# Patient Record
Sex: MALE | Race: WHITE | NOT HISPANIC OR LATINO | Employment: UNEMPLOYED | URBAN - METROPOLITAN AREA
[De-identification: names, ages, dates, MRNs, and addresses within clinical notes are randomized per-mention and may not be internally consistent; named-entity substitution may affect disease eponyms.]

---

## 2018-07-07 ENCOUNTER — HOSPITAL ENCOUNTER (INPATIENT)
Facility: HOSPITAL | Age: 53
LOS: 8 days | Discharge: HOME | End: 2018-07-15
Attending: INTERNAL MEDICINE | Admitting: INTERNAL MEDICINE

## 2018-07-07 ENCOUNTER — APPOINTMENT (OUTPATIENT)
Dept: CT IMAGING | Facility: HOSPITAL | Age: 53
End: 2018-07-07

## 2018-07-07 ENCOUNTER — APPOINTMENT (OUTPATIENT)
Dept: GENERAL RADIOLOGY | Facility: HOSPITAL | Age: 53
End: 2018-07-07

## 2018-07-07 ENCOUNTER — HOSPITAL ENCOUNTER (EMERGENCY)
Facility: HOSPITAL | Age: 53
Discharge: ANOTHER HOSPITAL | End: 2018-07-07
Attending: EMERGENCY MEDICINE | Admitting: EMERGENCY MEDICINE

## 2018-07-07 VITALS
RESPIRATION RATE: 24 BRPM | OXYGEN SATURATION: 96 % | BODY MASS INDEX: 34.58 KG/M2 | HEIGHT: 69 IN | HEART RATE: 109 BPM | WEIGHT: 233.5 LBS | SYSTOLIC BLOOD PRESSURE: 129 MMHG | DIASTOLIC BLOOD PRESSURE: 84 MMHG | TEMPERATURE: 98.4 F

## 2018-07-07 DIAGNOSIS — E08.10 DIABETIC KETOACIDOSIS WITHOUT COMA ASSOCIATED WITH DIABETES MELLITUS DUE TO UNDERLYING CONDITION (HCC): ICD-10-CM

## 2018-07-07 DIAGNOSIS — R26.89 DECREASED MOBILITY: ICD-10-CM

## 2018-07-07 DIAGNOSIS — J18.9 PNEUMONIA: ICD-10-CM

## 2018-07-07 DIAGNOSIS — A41.9 SEPSIS, DUE TO UNSPECIFIED ORGANISM: ICD-10-CM

## 2018-07-07 DIAGNOSIS — J18.9 PLEURAL EFFUSION ASSOCIATED WITH PULMONARY INFECTION: Primary | ICD-10-CM

## 2018-07-07 DIAGNOSIS — J91.8 PLEURAL EFFUSION ASSOCIATED WITH PULMONARY INFECTION: Primary | ICD-10-CM

## 2018-07-07 DIAGNOSIS — J90 PLEURAL EFFUSION: Primary | ICD-10-CM

## 2018-07-07 LAB
ALBUMIN SERPL-MCNC: 3.7 G/DL (ref 3.5–5.2)
ALBUMIN/GLOB SERPL: 0.8 G/DL
ALP SERPL-CCNC: 171 U/L (ref 40–129)
ALT SERPL W P-5'-P-CCNC: 10 U/L (ref 5–41)
ANION GAP SERPL CALCULATED.3IONS-SCNC: 15.1 MMOL/L
ANION GAP SERPL CALCULATED.3IONS-SCNC: 30.6 MMOL/L
APTT PPP: 33.1 SECONDS (ref 22.7–35.4)
ARTERIAL PATENCY WRIST A: POSITIVE
AST SERPL-CCNC: 13 U/L (ref 5–40)
ATMOSPHERIC PRESS: 745 MMHG
BACTERIA UR QL AUTO: ABNORMAL /HPF
BASE EXCESS BLDA CALC-SCNC: -22.8 MMOL/L (ref 0–2)
BASOPHILS # BLD AUTO: 0.04 10*3/MM3 (ref 0–0.2)
BASOPHILS # BLD AUTO: 0.16 10*3/MM3 (ref 0–0.2)
BASOPHILS NFR BLD AUTO: 0.3 % (ref 0–1.5)
BASOPHILS NFR BLD AUTO: 0.8 % (ref 0–2)
BDY SITE: ABNORMAL
BILIRUB SERPL-MCNC: 0.3 MG/DL (ref 0.2–1.2)
BILIRUB UR QL STRIP: ABNORMAL
BODY TEMPERATURE: 37 C
BUN BLD-MCNC: 10 MG/DL (ref 6–20)
BUN BLD-MCNC: 10 MG/DL (ref 6–20)
BUN/CREAT SERPL: 13 (ref 7–25)
BUN/CREAT SERPL: 15.2 (ref 7–25)
CA-I BLD-MCNC: 5.6 MG/DL (ref 4.6–5.4)
CA-I SERPL ISE-MCNC: 1.41 MMOL/L (ref 1.15–1.35)
CALCIUM SPEC-SCNC: 9.7 MG/DL (ref 8.6–10.5)
CALCIUM SPEC-SCNC: 9.8 MG/DL (ref 8.6–10.5)
CHLORIDE SERPL-SCNC: 105 MMOL/L (ref 98–107)
CHLORIDE SERPL-SCNC: 97 MMOL/L (ref 98–107)
CLARITY UR: CLEAR
CO2 SERPL-SCNC: 12.9 MMOL/L (ref 22–29)
CO2 SERPL-SCNC: 7.4 MMOL/L (ref 22–29)
COLOR UR: ABNORMAL
CREAT BLD-MCNC: 0.66 MG/DL (ref 0.76–1.27)
CREAT BLD-MCNC: 0.77 MG/DL (ref 0.76–1.27)
D DIMER PPP FEU-MCNC: 5.18 MCGFEU/ML (ref 0–0.46)
D-LACTATE SERPL-SCNC: 1.2 MMOL/L (ref 0.5–2)
DEPRECATED RDW RBC AUTO: 41.2 FL (ref 37–54)
DEPRECATED RDW RBC AUTO: 42.5 FL (ref 37–54)
EOSINOPHIL # BLD AUTO: 0.02 10*3/MM3 (ref 0.1–0.3)
EOSINOPHIL # BLD AUTO: 0.05 10*3/MM3 (ref 0–0.7)
EOSINOPHIL NFR BLD AUTO: 0.1 % (ref 0–4)
EOSINOPHIL NFR BLD AUTO: 0.3 % (ref 0.3–6.2)
ERYTHROCYTE [DISTWIDTH] IN BLOOD BY AUTOMATED COUNT: 13.5 % (ref 11.5–14.5)
ERYTHROCYTE [DISTWIDTH] IN BLOOD BY AUTOMATED COUNT: 13.6 % (ref 11.5–14.5)
GAS FLOW AIRWAY: 2 LPM
GFR SERPL CREATININE-BSD FRML MDRD: 106 ML/MIN/1.73
GFR SERPL CREATININE-BSD FRML MDRD: 126 ML/MIN/1.73
GLOBULIN UR ELPH-MCNC: 4.6 GM/DL
GLUCOSE BLD-MCNC: 101 MG/DL (ref 65–99)
GLUCOSE BLD-MCNC: 313 MG/DL (ref 65–99)
GLUCOSE BLDC GLUCOMTR-MCNC: 112 MG/DL (ref 70–130)
GLUCOSE BLDC GLUCOMTR-MCNC: 122 MG/DL (ref 70–130)
GLUCOSE BLDC GLUCOMTR-MCNC: 148 MG/DL (ref 70–130)
GLUCOSE BLDC GLUCOMTR-MCNC: 168 MG/DL (ref 70–130)
GLUCOSE BLDC GLUCOMTR-MCNC: 242 MG/DL (ref 70–130)
GLUCOSE BLDC GLUCOMTR-MCNC: 256 MG/DL (ref 70–130)
GLUCOSE BLDC GLUCOMTR-MCNC: 256 MG/DL (ref 70–130)
GLUCOSE BLDC GLUCOMTR-MCNC: 300 MG/DL (ref 70–130)
GLUCOSE BLDC GLUCOMTR-MCNC: 300 MG/DL (ref 70–130)
GLUCOSE BLDC GLUCOMTR-MCNC: 95 MG/DL (ref 70–130)
GLUCOSE UR STRIP-MCNC: ABNORMAL MG/DL
HBA1C MFR BLD: 14.3 % (ref 4.8–5.6)
HCO3 BLDA-SCNC: 4.6 MMOL/L (ref 20–26)
HCT VFR BLD AUTO: 40.1 % (ref 40.4–52.2)
HCT VFR BLD AUTO: 46.7 % (ref 42–52)
HGB BLD-MCNC: 13.7 G/DL (ref 13.7–17.6)
HGB BLD-MCNC: 15.4 G/DL (ref 14–18)
HGB BLDA-MCNC: 15 G/DL (ref 14–18)
HGB UR QL STRIP.AUTO: ABNORMAL
HYALINE CASTS UR QL AUTO: ABNORMAL /LPF
IMM GRANULOCYTES # BLD: 0.42 10*3/MM3 (ref 0–0.03)
IMM GRANULOCYTES # BLD: 1.11 10*3/MM3 (ref 0–0.03)
IMM GRANULOCYTES NFR BLD: 2.7 % (ref 0–0.5)
IMM GRANULOCYTES NFR BLD: 5.3 % (ref 0–0.5)
INR PPP: 1.24 (ref 0.9–1.1)
KETONES UR QL STRIP: ABNORMAL
LEUKOCYTE ESTERASE UR QL STRIP.AUTO: NEGATIVE
LYMPHOCYTES # BLD AUTO: 1.85 10*3/MM3 (ref 0.9–4.8)
LYMPHOCYTES # BLD AUTO: 2.02 10*3/MM3 (ref 0.6–4.8)
LYMPHOCYTES NFR BLD AUTO: 12 % (ref 19.6–45.3)
LYMPHOCYTES NFR BLD AUTO: 9.6 % (ref 20–45)
Lab: ABNORMAL
Lab: ABNORMAL
MAGNESIUM SERPL-MCNC: 1.9 MG/DL (ref 1.6–2.6)
MCH RBC QN AUTO: 28 PG (ref 27–32.7)
MCH RBC QN AUTO: 28.3 PG (ref 27–31)
MCHC RBC AUTO-ENTMCNC: 33 G/DL (ref 31–37)
MCHC RBC AUTO-ENTMCNC: 34.2 G/DL (ref 32.6–36.4)
MCV RBC AUTO: 82 FL (ref 79.8–96.2)
MCV RBC AUTO: 85.7 FL (ref 80–94)
MODALITY: ABNORMAL
MONOCYTES # BLD AUTO: 1.78 10*3/MM3 (ref 0.2–1.2)
MONOCYTES # BLD AUTO: 2.11 10*3/MM3 (ref 0–1)
MONOCYTES NFR BLD AUTO: 10 % (ref 3–8)
MONOCYTES NFR BLD AUTO: 11.5 % (ref 5–12)
NEUTROPHILS # BLD AUTO: 11.75 10*3/MM3 (ref 1.9–8.1)
NEUTROPHILS # BLD AUTO: 15.69 10*3/MM3 (ref 1.5–8.3)
NEUTROPHILS NFR BLD AUTO: 74.2 % (ref 45–70)
NEUTROPHILS NFR BLD AUTO: 75.9 % (ref 42.7–76)
NITRITE UR QL STRIP: NEGATIVE
NOTIFIED BY: ABNORMAL
NOTIFIED WHO: ABNORMAL
NRBC BLD MANUAL-RTO: 0 /100 WBC (ref 0–0)
NRBC BLD MANUAL-RTO: 0.1 /100 WBC (ref 0–0)
PCO2 BLDA: 14.6 MM HG (ref 35–45)
PCO2 TEMP ADJ BLD: 14.6 MM HG (ref 35–45)
PH BLDA: 7.11 PH UNITS (ref 7.35–7.45)
PH UR STRIP.AUTO: <=5 [PH] (ref 4.5–8)
PH, TEMP CORRECTED: 7.11 PH UNITS (ref 7.35–7.45)
PHOSPHATE SERPL-MCNC: 1.6 MG/DL (ref 2.5–4.5)
PLAT MORPH BLD: NORMAL
PLATELET # BLD AUTO: 413 10*3/MM3 (ref 140–500)
PLATELET # BLD AUTO: 474 10*3/MM3 (ref 140–500)
PMV BLD AUTO: 9.1 FL (ref 6–12)
PMV BLD AUTO: 9.3 FL (ref 7.4–10.4)
PO2 BLDA: 98.8 MM HG (ref 83–108)
PO2 TEMP ADJ BLD: 98.8 MM HG (ref 83–108)
POLYCHROMASIA BLD QL SMEAR: NORMAL
POTASSIUM BLD-SCNC: 3.7 MMOL/L (ref 3.5–5.2)
POTASSIUM BLD-SCNC: 4.4 MMOL/L (ref 3.5–5.2)
PROCALCITONIN SERPL-MCNC: 0.13 NG/ML (ref 0.1–0.25)
PROT SERPL-MCNC: 8.3 G/DL (ref 6–8.5)
PROT UR QL STRIP: ABNORMAL
PROTHROMBIN TIME: 15.4 SECONDS (ref 11.7–14.2)
RBC # BLD AUTO: 4.89 10*6/MM3 (ref 4.6–6)
RBC # BLD AUTO: 5.45 10*6/MM3 (ref 4.7–6.1)
RBC # UR: ABNORMAL /HPF
REF LAB TEST METHOD: ABNORMAL
SAO2 % BLDCOA: 96.9 % (ref 94–99)
SODIUM BLD-SCNC: 133 MMOL/L (ref 136–145)
SODIUM BLD-SCNC: 135 MMOL/L (ref 136–145)
SP GR UR STRIP: 1.03 (ref 1–1.03)
SQUAMOUS #/AREA URNS HPF: ABNORMAL /HPF
TROPONIN T SERPL-MCNC: <0.01 NG/ML (ref 0–0.03)
UROBILINOGEN UR QL STRIP: ABNORMAL
VENTILATOR MODE: ABNORMAL
WBC MORPH BLD: NORMAL
WBC NRBC COR # BLD: 15.47 10*3/MM3 (ref 4.5–10.7)
WBC NRBC COR # BLD: 21.11 10*3/MM3 (ref 4.8–10.8)
WBC UR QL AUTO: ABNORMAL /HPF

## 2018-07-07 PROCEDURE — 84100 ASSAY OF PHOSPHORUS: CPT | Performed by: INTERNAL MEDICINE

## 2018-07-07 PROCEDURE — 83605 ASSAY OF LACTIC ACID: CPT | Performed by: EMERGENCY MEDICINE

## 2018-07-07 PROCEDURE — 80048 BASIC METABOLIC PNL TOTAL CA: CPT | Performed by: INTERNAL MEDICINE

## 2018-07-07 PROCEDURE — 82962 GLUCOSE BLOOD TEST: CPT

## 2018-07-07 PROCEDURE — 36600 WITHDRAWAL OF ARTERIAL BLOOD: CPT

## 2018-07-07 PROCEDURE — 82803 BLOOD GASES ANY COMBINATION: CPT

## 2018-07-07 PROCEDURE — 83036 HEMOGLOBIN GLYCOSYLATED A1C: CPT | Performed by: INTERNAL MEDICINE

## 2018-07-07 PROCEDURE — 84145 PROCALCITONIN (PCT): CPT | Performed by: INTERNAL MEDICINE

## 2018-07-07 PROCEDURE — 84484 ASSAY OF TROPONIN QUANT: CPT | Performed by: EMERGENCY MEDICINE

## 2018-07-07 PROCEDURE — 25010000002 KETOROLAC TROMETHAMINE PER 15 MG: Performed by: EMERGENCY MEDICINE

## 2018-07-07 PROCEDURE — 96367 TX/PROPH/DG ADDL SEQ IV INF: CPT

## 2018-07-07 PROCEDURE — 85610 PROTHROMBIN TIME: CPT | Performed by: INTERNAL MEDICINE

## 2018-07-07 PROCEDURE — 85379 FIBRIN DEGRADATION QUANT: CPT | Performed by: EMERGENCY MEDICINE

## 2018-07-07 PROCEDURE — 99285 EMERGENCY DEPT VISIT HI MDM: CPT

## 2018-07-07 PROCEDURE — 96366 THER/PROPH/DIAG IV INF ADDON: CPT

## 2018-07-07 PROCEDURE — 71045 X-RAY EXAM CHEST 1 VIEW: CPT

## 2018-07-07 PROCEDURE — 82330 ASSAY OF CALCIUM: CPT | Performed by: INTERNAL MEDICINE

## 2018-07-07 PROCEDURE — 93005 ELECTROCARDIOGRAM TRACING: CPT | Performed by: EMERGENCY MEDICINE

## 2018-07-07 PROCEDURE — 85025 COMPLETE CBC W/AUTO DIFF WBC: CPT | Performed by: EMERGENCY MEDICINE

## 2018-07-07 PROCEDURE — 96375 TX/PRO/DX INJ NEW DRUG ADDON: CPT

## 2018-07-07 PROCEDURE — 71275 CT ANGIOGRAPHY CHEST: CPT

## 2018-07-07 PROCEDURE — 99284 EMERGENCY DEPT VISIT MOD MDM: CPT | Performed by: EMERGENCY MEDICINE

## 2018-07-07 PROCEDURE — 85025 COMPLETE CBC W/AUTO DIFF WBC: CPT | Performed by: INTERNAL MEDICINE

## 2018-07-07 PROCEDURE — 80053 COMPREHEN METABOLIC PANEL: CPT | Performed by: EMERGENCY MEDICINE

## 2018-07-07 PROCEDURE — 25010000002 CEFTRIAXONE PER 250 MG: Performed by: INTERNAL MEDICINE

## 2018-07-07 PROCEDURE — 96365 THER/PROPH/DIAG IV INF INIT: CPT

## 2018-07-07 PROCEDURE — 85730 THROMBOPLASTIN TIME PARTIAL: CPT | Performed by: INTERNAL MEDICINE

## 2018-07-07 PROCEDURE — 81001 URINALYSIS AUTO W/SCOPE: CPT | Performed by: EMERGENCY MEDICINE

## 2018-07-07 PROCEDURE — 25010000002 MORPHINE PER 10 MG: Performed by: INTERNAL MEDICINE

## 2018-07-07 PROCEDURE — 0 IOPAMIDOL PER 1 ML: Performed by: EMERGENCY MEDICINE

## 2018-07-07 PROCEDURE — 25010000002 LEVOFLOXACIN PER 250 MG: Performed by: EMERGENCY MEDICINE

## 2018-07-07 PROCEDURE — 83735 ASSAY OF MAGNESIUM: CPT | Performed by: INTERNAL MEDICINE

## 2018-07-07 PROCEDURE — 85007 BL SMEAR W/DIFF WBC COUNT: CPT | Performed by: EMERGENCY MEDICINE

## 2018-07-07 PROCEDURE — 93010 ELECTROCARDIOGRAM REPORT: CPT | Performed by: INTERNAL MEDICINE

## 2018-07-07 PROCEDURE — 87040 BLOOD CULTURE FOR BACTERIA: CPT | Performed by: EMERGENCY MEDICINE

## 2018-07-07 RX ORDER — DEXTROSE AND SODIUM CHLORIDE 5; .45 G/100ML; G/100ML
150 INJECTION, SOLUTION INTRAVENOUS CONTINUOUS PRN
Status: DISCONTINUED | OUTPATIENT
Start: 2018-07-07 | End: 2018-07-08

## 2018-07-07 RX ORDER — POTASSIUM CHLORIDE 750 MG/1
20 CAPSULE, EXTENDED RELEASE ORAL AS NEEDED
Status: CANCELLED | OUTPATIENT
Start: 2018-07-07

## 2018-07-07 RX ORDER — POTASSIUM CHLORIDE 7.46 G/1000ML
10 INJECTION, SOLUTION INTRAVENOUS AS NEEDED
Status: DISCONTINUED | OUTPATIENT
Start: 2018-07-07 | End: 2018-07-08

## 2018-07-07 RX ORDER — POTASSIUM CHLORIDE 750 MG/1
10 CAPSULE, EXTENDED RELEASE ORAL AS NEEDED
Status: DISCONTINUED | OUTPATIENT
Start: 2018-07-07 | End: 2018-07-08

## 2018-07-07 RX ORDER — DEXTROSE, SODIUM CHLORIDE, AND POTASSIUM CHLORIDE 5; .45; .15 G/100ML; G/100ML; G/100ML
150 INJECTION INTRAVENOUS CONTINUOUS PRN
Status: CANCELLED | OUTPATIENT
Start: 2018-07-07

## 2018-07-07 RX ORDER — POTASSIUM CHLORIDE 7.46 G/1000ML
10 INJECTION, SOLUTION INTRAVENOUS AS NEEDED
Status: CANCELLED | OUTPATIENT
Start: 2018-07-07

## 2018-07-07 RX ORDER — DEXTROSE AND SODIUM CHLORIDE 5; .45 G/100ML; G/100ML
150 INJECTION, SOLUTION INTRAVENOUS CONTINUOUS PRN
Status: CANCELLED | OUTPATIENT
Start: 2018-07-07

## 2018-07-07 RX ORDER — SODIUM CHLORIDE 0.9 % (FLUSH) 0.9 %
10 SYRINGE (ML) INJECTION AS NEEDED
Status: DISCONTINUED | OUTPATIENT
Start: 2018-07-07 | End: 2018-07-07 | Stop reason: HOSPADM

## 2018-07-07 RX ORDER — POTASSIUM CHLORIDE 750 MG/1
10 CAPSULE, EXTENDED RELEASE ORAL AS NEEDED
Status: CANCELLED | OUTPATIENT
Start: 2018-07-07

## 2018-07-07 RX ORDER — POTASSIUM CHLORIDE 1.5 G/1.77G
20 POWDER, FOR SOLUTION ORAL AS NEEDED
Status: CANCELLED | OUTPATIENT
Start: 2018-07-07

## 2018-07-07 RX ORDER — LISINOPRIL 20 MG/1
20 TABLET ORAL DAILY
COMMUNITY
End: 2018-07-15 | Stop reason: HOSPADM

## 2018-07-07 RX ORDER — POTASSIUM CHLORIDE 750 MG/1
40 CAPSULE, EXTENDED RELEASE ORAL AS NEEDED
Status: DISCONTINUED | OUTPATIENT
Start: 2018-07-07 | End: 2018-07-08

## 2018-07-07 RX ORDER — LEVOFLOXACIN 5 MG/ML
750 INJECTION, SOLUTION INTRAVENOUS ONCE
Status: COMPLETED | OUTPATIENT
Start: 2018-07-07 | End: 2018-07-07

## 2018-07-07 RX ORDER — DEXTROSE MONOHYDRATE 25 G/50ML
12.5 INJECTION, SOLUTION INTRAVENOUS
Status: DISCONTINUED | OUTPATIENT
Start: 2018-07-07 | End: 2018-07-08

## 2018-07-07 RX ORDER — POTASSIUM CHLORIDE 1.5 G/1.77G
20 POWDER, FOR SOLUTION ORAL AS NEEDED
Status: DISCONTINUED | OUTPATIENT
Start: 2018-07-07 | End: 2018-07-08

## 2018-07-07 RX ORDER — DEXTROSE MONOHYDRATE 25 G/50ML
25-50 INJECTION, SOLUTION INTRAVENOUS
Status: DISCONTINUED | OUTPATIENT
Start: 2018-07-07 | End: 2018-07-07 | Stop reason: HOSPADM

## 2018-07-07 RX ORDER — POTASSIUM CHLORIDE 1.5 G/1.77G
10 POWDER, FOR SOLUTION ORAL AS NEEDED
Status: CANCELLED | OUTPATIENT
Start: 2018-07-07

## 2018-07-07 RX ORDER — SODIUM CHLORIDE 450 MG/100ML
250 INJECTION, SOLUTION INTRAVENOUS CONTINUOUS
Status: CANCELLED | OUTPATIENT
Start: 2018-07-08

## 2018-07-07 RX ORDER — AZITHROMYCIN 250 MG/1
500 TABLET, FILM COATED ORAL
Status: DISCONTINUED | OUTPATIENT
Start: 2018-07-08 | End: 2018-07-07

## 2018-07-07 RX ORDER — HYDRALAZINE HYDROCHLORIDE 20 MG/ML
10 INJECTION INTRAMUSCULAR; INTRAVENOUS ONCE
Status: DISCONTINUED | OUTPATIENT
Start: 2018-07-07 | End: 2018-07-07 | Stop reason: HOSPADM

## 2018-07-07 RX ORDER — KETOROLAC TROMETHAMINE 30 MG/ML
30 INJECTION, SOLUTION INTRAMUSCULAR; INTRAVENOUS ONCE
Status: COMPLETED | OUTPATIENT
Start: 2018-07-07 | End: 2018-07-07

## 2018-07-07 RX ORDER — NAPROXEN SODIUM 220 MG
220 TABLET ORAL 2 TIMES DAILY PRN
COMMUNITY
End: 2018-07-15 | Stop reason: HOSPADM

## 2018-07-07 RX ORDER — POTASSIUM CHLORIDE 750 MG/1
20 CAPSULE, EXTENDED RELEASE ORAL AS NEEDED
Status: DISCONTINUED | OUTPATIENT
Start: 2018-07-07 | End: 2018-07-08

## 2018-07-07 RX ORDER — DEXTROSE, SODIUM CHLORIDE, AND POTASSIUM CHLORIDE 5; .45; .15 G/100ML; G/100ML; G/100ML
150 INJECTION INTRAVENOUS CONTINUOUS PRN
Status: DISCONTINUED | OUTPATIENT
Start: 2018-07-07 | End: 2018-07-08

## 2018-07-07 RX ORDER — DEXTROSE MONOHYDRATE 25 G/50ML
12.5 INJECTION, SOLUTION INTRAVENOUS
Status: CANCELLED | OUTPATIENT
Start: 2018-07-07

## 2018-07-07 RX ORDER — ASPIRIN 81 MG/1
324 TABLET, CHEWABLE ORAL ONCE
Status: COMPLETED | OUTPATIENT
Start: 2018-07-07 | End: 2018-07-07

## 2018-07-07 RX ORDER — POTASSIUM CHLORIDE 1.5 G/1.77G
10 POWDER, FOR SOLUTION ORAL AS NEEDED
Status: DISCONTINUED | OUTPATIENT
Start: 2018-07-07 | End: 2018-07-08

## 2018-07-07 RX ORDER — AZITHROMYCIN 250 MG/1
500 TABLET, FILM COATED ORAL
Status: COMPLETED | OUTPATIENT
Start: 2018-07-07 | End: 2018-07-11

## 2018-07-07 RX ORDER — SODIUM CHLORIDE 450 MG/100ML
250 INJECTION, SOLUTION INTRAVENOUS CONTINUOUS
Status: DISCONTINUED | OUTPATIENT
Start: 2018-07-07 | End: 2018-07-08

## 2018-07-07 RX ORDER — CEFTRIAXONE SODIUM 1 G/50ML
1 INJECTION, SOLUTION INTRAVENOUS EVERY 24 HOURS
Status: DISCONTINUED | OUTPATIENT
Start: 2018-07-08 | End: 2018-07-07

## 2018-07-07 RX ORDER — FENTANYL CITRATE 50 UG/ML
100 INJECTION, SOLUTION INTRAMUSCULAR; INTRAVENOUS ONCE
Status: COMPLETED | OUTPATIENT
Start: 2018-07-08 | End: 2018-07-09

## 2018-07-07 RX ORDER — HYDROCODONE BITARTRATE AND ACETAMINOPHEN 10; 325 MG/1; MG/1
1 TABLET ORAL EVERY 6 HOURS PRN
Status: DISCONTINUED | OUTPATIENT
Start: 2018-07-07 | End: 2018-07-13 | Stop reason: DRUGHIGH

## 2018-07-07 RX ORDER — MORPHINE SULFATE 2 MG/ML
2 INJECTION, SOLUTION INTRAMUSCULAR; INTRAVENOUS EVERY 4 HOURS PRN
Status: DISCONTINUED | OUTPATIENT
Start: 2018-07-07 | End: 2018-07-15 | Stop reason: HOSPADM

## 2018-07-07 RX ORDER — POTASSIUM CHLORIDE 750 MG/1
40 CAPSULE, EXTENDED RELEASE ORAL AS NEEDED
Status: CANCELLED | OUTPATIENT
Start: 2018-07-07

## 2018-07-07 RX ORDER — POTASSIUM CHLORIDE 1.5 G/1.77G
40 POWDER, FOR SOLUTION ORAL AS NEEDED
Status: CANCELLED | OUTPATIENT
Start: 2018-07-07

## 2018-07-07 RX ORDER — SODIUM CHLORIDE AND POTASSIUM CHLORIDE 150; 450 MG/100ML; MG/100ML
250 INJECTION, SOLUTION INTRAVENOUS CONTINUOUS PRN
Status: CANCELLED | OUTPATIENT
Start: 2018-07-07

## 2018-07-07 RX ORDER — SODIUM CHLORIDE AND POTASSIUM CHLORIDE 150; 450 MG/100ML; MG/100ML
250 INJECTION, SOLUTION INTRAVENOUS CONTINUOUS PRN
Status: DISCONTINUED | OUTPATIENT
Start: 2018-07-07 | End: 2018-07-08

## 2018-07-07 RX ORDER — POTASSIUM CHLORIDE 1.5 G/1.77G
40 POWDER, FOR SOLUTION ORAL AS NEEDED
Status: DISCONTINUED | OUTPATIENT
Start: 2018-07-07 | End: 2018-07-08

## 2018-07-07 RX ORDER — CEFTRIAXONE SODIUM 1 G/50ML
1 INJECTION, SOLUTION INTRAVENOUS EVERY 24 HOURS
Status: COMPLETED | OUTPATIENT
Start: 2018-07-07 | End: 2018-07-12

## 2018-07-07 RX ADMIN — DEXTROSE MONOHYDRATE 12.5 G: 25 INJECTION, SOLUTION INTRAVENOUS at 21:11

## 2018-07-07 RX ADMIN — ASPIRIN 81 MG 324 MG: 81 TABLET ORAL at 13:02

## 2018-07-07 RX ADMIN — MORPHINE SULFATE 2 MG: 2 INJECTION, SOLUTION INTRAMUSCULAR; INTRAVENOUS at 20:08

## 2018-07-07 RX ADMIN — VANCOMYCIN HYDROCHLORIDE 1 G: 1 INJECTION, POWDER, LYOPHILIZED, FOR SOLUTION INTRAVENOUS at 13:49

## 2018-07-07 RX ADMIN — KETOROLAC TROMETHAMINE 30 MG: 30 INJECTION, SOLUTION INTRAMUSCULAR at 13:02

## 2018-07-07 RX ADMIN — CEFTRIAXONE SODIUM 1 G: 1 INJECTION, SOLUTION INTRAVENOUS at 23:14

## 2018-07-07 RX ADMIN — HYDROCODONE BITARTRATE AND ACETAMINOPHEN 1 TABLET: 10; 325 TABLET ORAL at 23:17

## 2018-07-07 RX ADMIN — AZITHROMYCIN 500 MG: 250 TABLET, FILM COATED ORAL at 23:09

## 2018-07-07 RX ADMIN — POTASSIUM CHLORIDE, DEXTROSE MONOHYDRATE AND SODIUM CHLORIDE 150 ML/HR: 150; 5; 450 INJECTION, SOLUTION INTRAVENOUS at 21:18

## 2018-07-07 RX ADMIN — LEVOFLOXACIN 750 MG: 5 INJECTION, SOLUTION INTRAVENOUS at 15:18

## 2018-07-07 RX ADMIN — SODIUM CHLORIDE 10.6 UNITS/HR: 9 INJECTION, SOLUTION INTRAVENOUS at 14:35

## 2018-07-07 RX ADMIN — POTASSIUM CHLORIDE 20 MEQ: 750 CAPSULE, EXTENDED RELEASE ORAL at 21:56

## 2018-07-07 RX ADMIN — IOPAMIDOL 33 ML: 755 INJECTION, SOLUTION INTRAVENOUS at 14:00

## 2018-07-07 RX ADMIN — IOPAMIDOL 100 ML: 755 INJECTION, SOLUTION INTRAVENOUS at 14:00

## 2018-07-07 RX ADMIN — SODIUM CHLORIDE 1000 ML: 9 INJECTION, SOLUTION INTRAVENOUS at 14:17

## 2018-07-07 NOTE — ED NOTES
Call returned from Dr. Rocha at 15:09. This call is complete      Dannielle Partida, COURTNEY  07/07/18 6175

## 2018-07-07 NOTE — ED NOTES
Patient states that he was at [facility] Sunday-Wednesday for chest pain and possible pneumonia. Patient states that he left the hospital AMA on Wednesday and has had worsening chest pain and shortness of breath since.      Kimberli Marquez RN  07/07/18 6432

## 2018-07-07 NOTE — ED NOTES
"Called [facility] [place] to check on bed status as no room # received yet. [facility] bedboard said that  \"N\" had not requested a bed and she knew nothing about the patient. Bed board would call  \"N\"     Vaishnavi Garnica RN  07/07/18 4166    "

## 2018-07-07 NOTE — PLAN OF CARE
Problem: Patient Care Overview  Goal: Plan of Care Review  Outcome: Ongoing (interventions implemented as appropriate)   07/07/18 1836   Coping/Psychosocial   Plan of Care Reviewed With patient   OTHER   Outcome Summary pt arrived to ccu via ems stretcher, pt transfer from [facility] with DKA, sepsis, pneumonia, pleural effusions. pt arrived in no acute distress, insulin gtt infusing and cont at time of admission at 10.6unit/hr.        Problem: Skin Injury Risk (Adult)  Goal: Identify Related Risk Factors and Signs and Symptoms  Outcome: Ongoing (interventions implemented as appropriate)   07/07/18 1836   Skin Injury Risk (Adult)   Related Risk Factors (Skin Injury Risk) body weight extremes;critical care admission       Problem: Sepsis/Septic Shock (Adult)  Goal: Signs and Symptoms of Listed Potential Problems Will be Absent, Minimized or Managed (Sepsis/Septic Shock)  Outcome: Ongoing (interventions implemented as appropriate)   07/07/18 1836   Goal/Outcome Evaluation   Problems Assessed (Sepsis) all   Problems Present (Sepsis) glycemic control impaired       Problem: Diabetes, Type 2 (Adult)  Goal: Signs and Symptoms of Listed Potential Problems Will be Absent, Minimized or Managed (Diabetes, Type 2)  Outcome: Ongoing (interventions implemented as appropriate)   07/07/18 1836   Goal/Outcome Evaluation   Problems Assessed (Type 2 Diabetes) all   Problems Present (Type 2 Diabetes) DKA (diabetic ketoacidosis)/HHS (hyperosmolar hyperglycemic state)       Problem: Fall Risk (Adult)  Goal: Identify Related Risk Factors and Signs and Symptoms  Outcome: Ongoing (interventions implemented as appropriate)   07/07/18 1836   Fall Risk (Adult)   Related Risk Factors (Fall Risk) environment unfamiliar;slippery/uneven surfaces   Signs and Symptoms (Fall Risk) presence of risk factors       Problem: Pain, Acute (Adult)  Goal: Identify Related Risk Factors and Signs and Symptoms  Outcome: Ongoing (interventions  implemented as appropriate)   07/07/18 1211   Pain, Acute (Adult)   Related Risk Factors (Acute Pain) infection;fear;positioning;procedure/treatment   Signs and Symptoms (Acute Pain) facial mask of pain/grimace;moaning

## 2018-07-07 NOTE — ED NOTES
Dr. Valencia informed of accucheck. Order to keep insulin drip @ same rate. Also okay to give patient muriel Garnica RN  07/07/18 4281

## 2018-07-07 NOTE — ED NOTES
Dr. Esquivel with [facility] called at [phone] at 14:55. Waiting for call back.      Dannielle Partida CNA  07/07/18 4732

## 2018-07-07 NOTE — ED NOTES
Call made to Dr. Rocha at 14:50. Waiting for call back.      Dannielle Partida, COURTNEY  07/07/18 4215

## 2018-07-07 NOTE — ED NOTES
Pt left ED via EMS to [facility]     Vaishnavi Garnica RN  07/07/18 5230

## 2018-07-07 NOTE — ED PROVIDER NOTES
Subjective     History provided by:  Patient  Shortness of Breath   Severity:  Moderate  Onset quality:  Gradual  Duration:  1 week  Timing:  Constant  Progression:  Waxing and waning  Chronicity:  New  Context comment:  Pt admitted to [facility] sev days dx pneumonia and dm left ama home for 3 days, today with worsening soa and left sided cp  Relieved by:  Nothing  Worsened by:  Movement  Ineffective treatments:  None tried  Associated symptoms: no abdominal pain, no chest pain, no fever, no hemoptysis, no rash, no sputum production, no vomiting and no wheezing        Review of Systems   Constitutional: Negative for fever.   Respiratory: Positive for shortness of breath. Negative for hemoptysis, sputum production and wheezing.    Cardiovascular: Negative for chest pain.   Gastrointestinal: Negative for abdominal pain and vomiting.   Skin: Negative for rash.       Past Medical History:   Diagnosis Date   • Diabetes mellitus (CMS/HCC)    • Hypertension        Allergies   Allergen Reactions   • Penicillins Unknown (See Comments)     reaction as child       Past Surgical History:   Procedure Laterality Date   • EYE SURGERY     • INNER EAR SURGERY         History reviewed. No pertinent family history.    Social History     Social History   • Marital status: Legally      Social History Main Topics   • Smoking status: Never Smoker   • Smokeless tobacco: Never Used   • Alcohol use Yes      Comment: occ   • Drug use: No     Other Topics Concern   • Not on file           Objective   Physical Exam   Constitutional: He is oriented to person, place, and time. He appears well-developed and well-nourished. No distress.   HENT:   Head: Normocephalic and atraumatic.   Nose: Nose normal.   Mouth/Throat: Oropharynx is clear and moist. No oropharyngeal exudate.   Eyes: Conjunctivae and EOM are normal. Pupils are equal, round, and reactive to light. Right eye exhibits no discharge. Left eye exhibits no discharge. No scleral icterus.    Neck: Normal range of motion. Neck supple. No JVD present. No tracheal deviation present. No thyromegaly present.   Cardiovascular: Regular rhythm, normal heart sounds and intact distal pulses.  Exam reveals no gallop and no friction rub.    No murmur heard.  tachy   Pulmonary/Chest: Effort normal and breath sounds normal. No stridor. No respiratory distress. He has no wheezes. He has no rales. He exhibits no tenderness.   Some tacypnea, full sentences, no distress   Abdominal: Soft. Bowel sounds are normal. He exhibits no distension and no mass. There is no tenderness. There is no rebound and no guarding. No hernia.   Musculoskeletal: Normal range of motion. He exhibits tenderness. He exhibits no edema or deformity.   Left chest wall tender   Lymphadenopathy:     He has no cervical adenopathy.   Neurological: He is alert and oriented to person, place, and time. He displays normal reflexes. No cranial nerve deficit or sensory deficit. He exhibits normal muscle tone. Coordination normal.   Skin: Skin is warm. No rash noted. He is diaphoretic. No erythema. No pallor.   Psychiatric: He has a normal mood and affect.   Nursing note and vitals reviewed.      Procedures           ED Course      Reeval, stable exam, dr julian accepts [facility] icu transfer            MDM      Final diagnoses:   Pleural effusion associated with pulmonary infection   Diabetic ketoacidosis without coma associated with diabetes mellitus due to underlying condition (CMS/[facility])   Sepsis, due to unspecified organism (CMS/[facility])            Alonzo Valencia MD  07/07/18 4219

## 2018-07-08 LAB
ANION GAP SERPL CALCULATED.3IONS-SCNC: 14 MMOL/L
ANION GAP SERPL CALCULATED.3IONS-SCNC: 14.3 MMOL/L
ANION GAP SERPL CALCULATED.3IONS-SCNC: 14.6 MMOL/L
ANION GAP SERPL CALCULATED.3IONS-SCNC: 18 MMOL/L
ANION GAP SERPL CALCULATED.3IONS-SCNC: 18.7 MMOL/L
ATMOSPHERIC PRESS: 753.1 MMHG
BASE EXCESS BLDV CALC-SCNC: -2.6 MMOL/L
BDY SITE: ABNORMAL
BUN BLD-MCNC: 5 MG/DL (ref 6–20)
BUN BLD-MCNC: 6 MG/DL (ref 6–20)
BUN BLD-MCNC: 7 MG/DL (ref 6–20)
BUN BLD-MCNC: 8 MG/DL (ref 6–20)
BUN BLD-MCNC: 9 MG/DL (ref 6–20)
BUN/CREAT SERPL: 11.1 (ref 7–25)
BUN/CREAT SERPL: 13 (ref 7–25)
BUN/CREAT SERPL: 13.3 (ref 7–25)
BUN/CREAT SERPL: 14 (ref 7–25)
BUN/CREAT SERPL: 16.1 (ref 7–25)
CA-I BLD-MCNC: 5.2 MG/DL (ref 4.6–5.4)
CA-I BLD-MCNC: 5.5 MG/DL (ref 4.6–5.4)
CA-I BLD-MCNC: 5.5 MG/DL (ref 4.6–5.4)
CA-I SERPL ISE-MCNC: 1.29 MMOL/L (ref 1.15–1.35)
CA-I SERPL ISE-MCNC: 1.29 MMOL/L (ref 1.15–1.35)
CA-I SERPL ISE-MCNC: 1.31 MMOL/L (ref 1.15–1.35)
CA-I SERPL ISE-MCNC: 1.37 MMOL/L (ref 1.15–1.35)
CA-I SERPL ISE-MCNC: 1.37 MMOL/L (ref 1.15–1.35)
CALCIUM SPEC-SCNC: 8.5 MG/DL (ref 8.6–10.5)
CALCIUM SPEC-SCNC: 8.5 MG/DL (ref 8.6–10.5)
CALCIUM SPEC-SCNC: 8.6 MG/DL (ref 8.6–10.5)
CALCIUM SPEC-SCNC: 9.1 MG/DL (ref 8.6–10.5)
CALCIUM SPEC-SCNC: 9.2 MG/DL (ref 8.6–10.5)
CHLORIDE SERPL-SCNC: 101 MMOL/L (ref 98–107)
CHLORIDE SERPL-SCNC: 102 MMOL/L (ref 98–107)
CHLORIDE SERPL-SCNC: 103 MMOL/L (ref 98–107)
CO2 SERPL-SCNC: 11.3 MMOL/L (ref 22–29)
CO2 SERPL-SCNC: 13 MMOL/L (ref 22–29)
CO2 SERPL-SCNC: 16.4 MMOL/L (ref 22–29)
CO2 SERPL-SCNC: 16.7 MMOL/L (ref 22–29)
CO2 SERPL-SCNC: 19 MMOL/L (ref 22–29)
CREAT BLD-MCNC: 0.45 MG/DL (ref 0.76–1.27)
CREAT BLD-MCNC: 0.46 MG/DL (ref 0.76–1.27)
CREAT BLD-MCNC: 0.5 MG/DL (ref 0.76–1.27)
CREAT BLD-MCNC: 0.56 MG/DL (ref 0.76–1.27)
CREAT BLD-MCNC: 0.6 MG/DL (ref 0.76–1.27)
GFR SERPL CREATININE-BSD FRML MDRD: 141 ML/MIN/1.73
GFR SERPL CREATININE-BSD FRML MDRD: >150 ML/MIN/1.73
GLUCOSE BLD-MCNC: 171 MG/DL (ref 65–99)
GLUCOSE BLD-MCNC: 183 MG/DL (ref 65–99)
GLUCOSE BLD-MCNC: 197 MG/DL (ref 65–99)
GLUCOSE BLD-MCNC: 199 MG/DL (ref 65–99)
GLUCOSE BLD-MCNC: 221 MG/DL (ref 65–99)
GLUCOSE BLDC GLUCOMTR-MCNC: 164 MG/DL (ref 70–130)
GLUCOSE BLDC GLUCOMTR-MCNC: 172 MG/DL (ref 70–130)
GLUCOSE BLDC GLUCOMTR-MCNC: 177 MG/DL (ref 70–130)
GLUCOSE BLDC GLUCOMTR-MCNC: 179 MG/DL (ref 70–130)
GLUCOSE BLDC GLUCOMTR-MCNC: 189 MG/DL (ref 70–130)
GLUCOSE BLDC GLUCOMTR-MCNC: 193 MG/DL (ref 70–130)
GLUCOSE BLDC GLUCOMTR-MCNC: 201 MG/DL (ref 70–130)
GLUCOSE BLDC GLUCOMTR-MCNC: 206 MG/DL (ref 70–130)
GLUCOSE BLDC GLUCOMTR-MCNC: 206 MG/DL (ref 70–130)
GLUCOSE BLDC GLUCOMTR-MCNC: 208 MG/DL (ref 70–130)
GLUCOSE BLDC GLUCOMTR-MCNC: 210 MG/DL (ref 70–130)
GLUCOSE BLDC GLUCOMTR-MCNC: 221 MG/DL (ref 70–130)
GLUCOSE BLDC GLUCOMTR-MCNC: 239 MG/DL (ref 70–130)
HCO3 BLDV-SCNC: 21.3 MMOL/L (ref 22–28)
MAGNESIUM SERPL-MCNC: 1.9 MG/DL (ref 1.6–2.6)
MAGNESIUM SERPL-MCNC: 2 MG/DL (ref 1.6–2.6)
MAGNESIUM SERPL-MCNC: 2 MG/DL (ref 1.6–2.6)
MAGNESIUM SERPL-MCNC: 2.2 MG/DL (ref 1.6–2.6)
MODALITY: ABNORMAL
PCO2 BLDV: 33.7 MM HG (ref 41–51)
PH BLDV: 7.41 PH UNITS (ref 7.31–7.41)
PHOSPHATE SERPL-MCNC: 0.9 MG/DL (ref 2.5–4.5)
PHOSPHATE SERPL-MCNC: 1.3 MG/DL (ref 2.5–4.5)
PHOSPHATE SERPL-MCNC: 1.7 MG/DL (ref 2.5–4.5)
PO2 BLDV: 25.8 MM HG (ref 35–45)
POTASSIUM BLD-SCNC: 3.5 MMOL/L (ref 3.5–5.2)
POTASSIUM BLD-SCNC: 3.7 MMOL/L (ref 3.5–5.2)
POTASSIUM BLD-SCNC: 3.8 MMOL/L (ref 3.5–5.2)
POTASSIUM BLD-SCNC: 3.9 MMOL/L (ref 3.5–5.2)
POTASSIUM BLD-SCNC: 4.2 MMOL/L (ref 3.5–5.2)
SAO2 % BLDCOA: 48.7 % (ref 92–99)
SODIUM BLD-SCNC: 132 MMOL/L (ref 136–145)
SODIUM BLD-SCNC: 134 MMOL/L (ref 136–145)
SODIUM BLD-SCNC: 134 MMOL/L (ref 136–145)
TOTAL RATE: 16 BREATHS/MINUTE

## 2018-07-08 PROCEDURE — 25010000002 MORPHINE PER 10 MG: Performed by: INTERNAL MEDICINE

## 2018-07-08 PROCEDURE — 83735 ASSAY OF MAGNESIUM: CPT | Performed by: INTERNAL MEDICINE

## 2018-07-08 PROCEDURE — 82330 ASSAY OF CALCIUM: CPT | Performed by: INTERNAL MEDICINE

## 2018-07-08 PROCEDURE — 82803 BLOOD GASES ANY COMBINATION: CPT

## 2018-07-08 PROCEDURE — 25010000002 MAGNESIUM SULFATE 2 GM/50ML SOLUTION: Performed by: INTERNAL MEDICINE

## 2018-07-08 PROCEDURE — 84100 ASSAY OF PHOSPHORUS: CPT | Performed by: INTERNAL MEDICINE

## 2018-07-08 PROCEDURE — 82962 GLUCOSE BLOOD TEST: CPT

## 2018-07-08 PROCEDURE — 25010000002 HYDROMORPHONE PER 4 MG: Performed by: INTERNAL MEDICINE

## 2018-07-08 PROCEDURE — 87070 CULTURE OTHR SPECIMN AEROBIC: CPT | Performed by: INTERNAL MEDICINE

## 2018-07-08 PROCEDURE — 87205 SMEAR GRAM STAIN: CPT | Performed by: INTERNAL MEDICINE

## 2018-07-08 PROCEDURE — 94799 UNLISTED PULMONARY SVC/PX: CPT

## 2018-07-08 PROCEDURE — 80048 BASIC METABOLIC PNL TOTAL CA: CPT | Performed by: INTERNAL MEDICINE

## 2018-07-08 PROCEDURE — 63710000001 INSULIN DETEMER PER 5 UNITS: Performed by: INTERNAL MEDICINE

## 2018-07-08 PROCEDURE — 63710000001 INSULIN ASPART PER 5 UNITS: Performed by: INTERNAL MEDICINE

## 2018-07-08 RX ORDER — MAGNESIUM SULFATE HEPTAHYDRATE 40 MG/ML
2 INJECTION, SOLUTION INTRAVENOUS AS NEEDED
Status: DISCONTINUED | OUTPATIENT
Start: 2018-07-08 | End: 2018-07-15 | Stop reason: HOSPADM

## 2018-07-08 RX ORDER — POTASSIUM CHLORIDE 750 MG/1
40 CAPSULE, EXTENDED RELEASE ORAL AS NEEDED
Status: DISCONTINUED | OUTPATIENT
Start: 2018-07-08 | End: 2018-07-15 | Stop reason: HOSPADM

## 2018-07-08 RX ORDER — POTASSIUM CHLORIDE 1.5 G/1.77G
40 POWDER, FOR SOLUTION ORAL AS NEEDED
Status: DISCONTINUED | OUTPATIENT
Start: 2018-07-08 | End: 2018-07-15 | Stop reason: HOSPADM

## 2018-07-08 RX ORDER — SODIUM CHLORIDE 9 MG/ML
9 INJECTION, SOLUTION INTRAVENOUS CONTINUOUS
Status: DISCONTINUED | OUTPATIENT
Start: 2018-07-08 | End: 2018-07-13

## 2018-07-08 RX ORDER — NICOTINE POLACRILEX 4 MG
15 LOZENGE BUCCAL
Status: DISCONTINUED | OUTPATIENT
Start: 2018-07-08 | End: 2018-07-15 | Stop reason: HOSPADM

## 2018-07-08 RX ORDER — LIDOCAINE 50 MG/G
1 PATCH TOPICAL
Status: DISCONTINUED | OUTPATIENT
Start: 2018-07-08 | End: 2018-07-15 | Stop reason: HOSPADM

## 2018-07-08 RX ORDER — DEXTROSE MONOHYDRATE 25 G/50ML
25 INJECTION, SOLUTION INTRAVENOUS
Status: DISCONTINUED | OUTPATIENT
Start: 2018-07-08 | End: 2018-07-15 | Stop reason: HOSPADM

## 2018-07-08 RX ORDER — MAGNESIUM SULFATE HEPTAHYDRATE 40 MG/ML
2 INJECTION, SOLUTION INTRAVENOUS AS NEEDED
Status: DISCONTINUED | OUTPATIENT
Start: 2018-07-08 | End: 2018-07-08

## 2018-07-08 RX ORDER — MAGNESIUM SULFATE HEPTAHYDRATE 40 MG/ML
4 INJECTION, SOLUTION INTRAVENOUS AS NEEDED
Status: DISCONTINUED | OUTPATIENT
Start: 2018-07-08 | End: 2018-07-08

## 2018-07-08 RX ORDER — MAGNESIUM SULFATE HEPTAHYDRATE 40 MG/ML
4 INJECTION, SOLUTION INTRAVENOUS AS NEEDED
Status: DISCONTINUED | OUTPATIENT
Start: 2018-07-08 | End: 2018-07-15 | Stop reason: HOSPADM

## 2018-07-08 RX ORDER — CYCLOBENZAPRINE HCL 10 MG
10 TABLET ORAL 3 TIMES DAILY PRN
Status: DISCONTINUED | OUTPATIENT
Start: 2018-07-08 | End: 2018-07-15 | Stop reason: HOSPADM

## 2018-07-08 RX ADMIN — POTASSIUM CHLORIDE 20 MEQ: 750 CAPSULE, EXTENDED RELEASE ORAL at 10:09

## 2018-07-08 RX ADMIN — MAGNESIUM SULFATE HEPTAHYDRATE 2 G: 40 INJECTION, SOLUTION INTRAVENOUS at 02:27

## 2018-07-08 RX ADMIN — INSULIN ASPART 4 UNITS: 100 INJECTION, SOLUTION INTRAVENOUS; SUBCUTANEOUS at 22:07

## 2018-07-08 RX ADMIN — POTASSIUM CHLORIDE, DEXTROSE MONOHYDRATE AND SODIUM CHLORIDE 150 ML/HR: 150; 5; 450 INJECTION, SOLUTION INTRAVENOUS at 10:04

## 2018-07-08 RX ADMIN — HYDROCODONE BITARTRATE AND ACETAMINOPHEN 1 TABLET: 10; 325 TABLET ORAL at 05:12

## 2018-07-08 RX ADMIN — MORPHINE SULFATE 2 MG: 2 INJECTION, SOLUTION INTRAMUSCULAR; INTRAVENOUS at 02:43

## 2018-07-08 RX ADMIN — LIDOCAINE 1 PATCH: 50 PATCH CUTANEOUS at 13:51

## 2018-07-08 RX ADMIN — HYDROMORPHONE HYDROCHLORIDE 0.5 MG: 10 INJECTION, SOLUTION INTRAMUSCULAR; INTRAVENOUS; SUBCUTANEOUS at 20:53

## 2018-07-08 RX ADMIN — HYDROCODONE BITARTRATE AND ACETAMINOPHEN 1 TABLET: 10; 325 TABLET ORAL at 21:58

## 2018-07-08 RX ADMIN — POTASSIUM PHOSPHATE, MONOBASIC AND POTASSIUM PHOSPHATE, DIBASIC 45 MMOL: 224; 236 INJECTION, SOLUTION, CONCENTRATE INTRAVENOUS at 12:00

## 2018-07-08 RX ADMIN — MAGNESIUM SULFATE HEPTAHYDRATE 2 G: 40 INJECTION, SOLUTION INTRAVENOUS at 03:07

## 2018-07-08 RX ADMIN — MORPHINE SULFATE 2 MG: 2 INJECTION, SOLUTION INTRAMUSCULAR; INTRAVENOUS at 13:49

## 2018-07-08 RX ADMIN — POTASSIUM CHLORIDE, DEXTROSE MONOHYDRATE AND SODIUM CHLORIDE 150 ML/HR: 150; 5; 450 INJECTION, SOLUTION INTRAVENOUS at 03:20

## 2018-07-08 RX ADMIN — SODIUM CHLORIDE 9 ML/HR: 9 INJECTION, SOLUTION INTRAVENOUS at 05:32

## 2018-07-08 RX ADMIN — INSULIN DETEMIR 15 UNITS: 100 INJECTION, SOLUTION SUBCUTANEOUS at 18:39

## 2018-07-08 RX ADMIN — HYDROMORPHONE HYDROCHLORIDE 0.5 MG: 10 INJECTION, SOLUTION INTRAMUSCULAR; INTRAVENOUS; SUBCUTANEOUS at 17:29

## 2018-07-08 RX ADMIN — POTASSIUM CHLORIDE 20 MEQ: 750 CAPSULE, EXTENDED RELEASE ORAL at 02:03

## 2018-07-08 RX ADMIN — MORPHINE SULFATE 2 MG: 2 INJECTION, SOLUTION INTRAMUSCULAR; INTRAVENOUS at 08:30

## 2018-07-08 RX ADMIN — HYDROCODONE BITARTRATE AND ACETAMINOPHEN 1 TABLET: 10; 325 TABLET ORAL at 12:03

## 2018-07-08 NOTE — CONSULTS
Adult Nutrition  Assessment/PES    Patient Name:  Myles Toledo  YOB: 1965  MRN: [id number]  Admit Date:  7/7/2018    Assessment Date:  7/8/2018  Nutrition assessment completed. Patient admitted with DKA, currently NPO.  Will follow up and educate once diet advanced.          Reason for Assessment     Row Name 07/08/18 1139          Reason for Assessment    Reason For Assessment identified at risk by screening criteria;nurse/nurse practitioner consult     Diagnosis diabetes diagnosis/complications   Primary Problem:  DKA               Anthropometrics     Row Name 07/08/18 1139          Body Mass Index (BMI)    BMI Assessment BMI 35-39.9: obesity grade II             Labs/Tests/Procedures/Meds     Row Name 07/08/18 1139          Labs/Procedures/Meds    Lab Results Reviewed reviewed, pertinent        Diagnostic Tests/Procedures    Diagnostic Test/Procedure Reviewed reviewed, pertinent        Medications    Pertinent Medications Reviewed reviewed, pertinent     Pertinent Medications Comments insulin, NaCl             Physical Findings     Row Name 07/08/18 1139          Physical Findings    Overall Physical Appearance obese   B=16             Estimated/Assessed Needs     Row Name 07/08/18 1139          Calculation Measurements    Weight Used For Calculations 107 kg (235 lb 14.3 oz)        Estimated/Assessed Needs    Additional Documentation KCAL/KG (Group);Protein Requirements (Group);Fluid Requirements (Group)        KCAL/KG                                                                kcal/kg (Specify) --   0875-0876        [place]-St. Jeor Equation    RMR ([place]-St. Jeor Equation) 1889.5        Protein Requirements    Est Protein Requirement Amount (gms/kg) 1.0 gm protein     Estimated Protein Requirements (gms/day) 107        Fluid Requirements    Estimated Fluid Requirements (mL/day) 2600     RDA Method (mL) 2600     [place]-Segar Method (over 20 kg) 3640             Nutrition Prescription  Ordered     Row Name 07/08/18 1140          Nutrition Prescription PO    Current PO Diet NPO             Evaluation of Received Nutrient/Fluid Intake     Row Name 07/08/18 1139          Calculation Measurements    Weight Used For Calculations 107 kg (235 lb 14.3 oz)             Evaluation of Prescribed Nutrient/Fluid Intake     Row Name 07/08/18 1139          Calculation Measurements    Weight Used For Calculations 107 kg (235 lb 14.3 oz)           Problem/Interventions:        Problem 1     Row Name 07/08/18 1141          Nutrition Diagnoses Problem 1    Problem 1 Limited Adherence to Diet Modifications     Etiology (related to) Medical Diagnosis     Endocrine --   DKA     Signs/Symptoms (evidenced by) Potential Information Deficit;Demonstrated Information Deficit;Biochemical     Specific Labs Noted Glucose                     Intervention Goal     Row Name 07/08/18 1141          Intervention Goal    General Maintain nutrition;Meet nutritional needs for age/condition     PO Initiate feeding     Weight Appropriate weight loss             Nutrition Intervention     Row Name 07/08/18 1141          Nutrition Intervention    RD/Tech Action Follow Tx progress;Care plan reviewd               Education/Evaluation     Row Name 07/08/18 1142          Education    Education Will Instruct as appropriate        Monitor/Evaluation    Monitor Per protocol     Education Follow-up Reinforce PRN         Electronically signed by:  Kimberli Giles RD  07/08/18 11:42 AM

## 2018-07-08 NOTE — PROGRESS NOTES
[facility] INPATIENT PROGRESS NOTE         [facility] CORONARY CARE    2018      PATIENT IDENTIFICATION:  Name: Myles Toledo ADMIT: 2018   : 1965  PCP: Miguel Ray DO    MRN: [id number] LOS: 1 days   AGE/SEX: 52 y.o. male  ROOM: [facility]/                     LOS 1    Reason for visit: Follow-up DKA with sepsis, pneumonia and loculated effusion      SUBJECTIVE:    Complains of shortness of breath and pleuritic chest discomfort on the left.  Moderate cough.    Objective   OBJECTIVE:    Vital Sign Min/Max for last 24 hours  Temp  Min: 97.8 °F (36.6 °C)  Max: 99.4 °F (37.4 °C)   BP  Min: 117/72  Max: 181/103   Pulse  Min: 81  Max: 123   Resp  Min: 20  Max: 44   SpO2  Min: 94 %  Max: 99 %   No Data Recorded   Weight  Min: 106 kg (233 lb 8 oz)  Max: 107 kg (235 lb 0.2 oz)                         Body mass index is 35.73 kg/m².    Intake/Output Summary (Last 24 hours) at 18 1017  Last data filed at 18 1010   Gross per 24 hour   Intake          1799.72 ml   Output             2000 ml   Net          -200.28 ml         Exam:  GEN:  Mild respiratory distress, appears stated age  EYES:   PERRL, anicteric sclera  ENT:    External ears/nose normal, OP clear  NECK:  No adenopathy, midline trachea  LUNGS: Normal chest on inspection, palpation and Diminished left greater than right with scattered coarse breath sounds on auscultation  CV:  Normal S1S2, without murmur  ABD:  Non tender, non distended, no hepatosplenomegaly, +BS  EXT:  No edema, cyanosis or clubbing    Scheduled meds:    azithromycin 500 mg Oral Q24H   ceftriaxone 1 g Intravenous Q24H   fentaNYL citrate (PF) 100 mcg Intravenous Once   insulin regular 10 Units Intravenous Once   sodium chloride 1,000 mL Intravenous Once     IV meds:                        dextrose 5 % and sodium chloride 0.45 % 150 mL/hr    dextrose 5 % and sodium chloride 0.45 % with KCl 20 mEq/L 150 mL/hr Last Rate: 150 mL/hr (18 1004)   insulin regular  infusion 1 unit/mL 0.1 Units/kg/hr Last Rate: 4 Units/hr (07/08/18 0753)   sodium chloride 250 mL/hr    sodium chloride 0.45 % with KCl 20 mEq 250 mL/hr    sodium chloride 9 mL/hr Last Rate: 9 mL/hr (07/08/18 0532)     Data Review:    Results from last 7 days  Lab Units 07/08/18  0824 07/08/18  0417 07/08/18  0048 07/07/18  2019 07/07/18  1249   SODIUM mmol/L 134* 132* 132* 133* 135*   POTASSIUM mmol/L 3.7 4.2 3.8 3.7 4.4   CHLORIDE mmol/L 103 101 102 105 97*   CO2 mmol/L 16.4* 13.0* 11.3* 12.9* 7.4*   BUN mg/dL 7 8 9 10 10   CREATININE mg/dL 0.50* 0.60* 0.56* 0.66* 0.77   GLUCOSE mg/dL 197* 221* 199* 101* 313*   CALCIUM mg/dL 8.6 8.5* 8.5* 9.7 9.8         Estimated Creatinine Clearance: 202.5 mL/min (A) (by C-G formula based on SCr of 0.5 mg/dL (L)).    Results from last 7 days  Lab Units 07/07/18 2003 07/07/18  1249   WBC 10*3/mm3 15.47* 21.11*   HEMOGLOBIN g/dL 13.7 15.4   PLATELETS 10*3/mm3 413 474       Results from last 7 days  Lab Units 07/07/18  2005   INR  1.24*       Results from last 7 days  Lab Units 07/07/18  1249   ALT (SGPT) U/L 10   AST (SGOT) U/L 13       Results from last 7 days  Lab Units 07/07/18  1350   PH, ARTERIAL pH units 7.105*   PO2 ART mm Hg 98.8   PCO2, ARTERIAL mm Hg 14.6*   HCO3 ART mmol/L 4.6*       Results from last 7 days  Lab Units 07/07/18  1313 07/07/18  1249   PROCALCITONIN ng/mL  --  0.13   LACTATE mmol/L 1.2  --          Chest x-ray and CT chest viewed    Microbiology reviewed        )Assessment   ASSESSMENT:      Acute DKA  Loculated left pleural effusion  Left pleuritic chest pain secondary to PNA and effusion  Left lower lobe pneumonia  Sepsis  Hyponatremia  Hypertension      PLAN:  Insulin drip and IV fluids per DKA protocol.  I don't think chest tube is the right move for his left effusion with signs of loculation and will ask cardiothoracic surgery to see the patient.  Suspect will require VATS.  Antibiotics for pneumonia.  Pain control.    CCT: 35 min    Miguel Sequeira,  MD  Pulmonary and Critical Care Medicine  [facility], [facility]  7/8/2018    10:17 AM

## 2018-07-08 NOTE — PLAN OF CARE
Problem: Patient Care Overview  Goal: Plan of Care Review  Outcome: Ongoing (interventions implemented as appropriate)   07/08/18 1800   Coping/Psychosocial   Plan of Care Reviewed With patient;significant other   OTHER   Outcome Summary DKA resolved, see new orders, pain controlled this evening with dilaudid, pt ate dinner and tolerated well, remain npo after midnight until evaluated by dr. dickey tomorrow , for possible surgery. pt remains stable on room air,. no acute distress. will cont to monitor in ccu.        Problem: Sepsis/Septic Shock (Adult)  Goal: Signs and Symptoms of Listed Potential Problems Will be Absent, Minimized or Managed (Sepsis/Septic Shock)  Outcome: Ongoing (interventions implemented as appropriate)   07/08/18 1800   Goal/Outcome Evaluation   Problems Assessed (Sepsis) all   Problems Present (Sepsis) none       Problem: Diabetes, Type 2 (Adult)  Goal: Signs and Symptoms of Listed Potential Problems Will be Absent, Minimized or Managed (Diabetes, Type 2)  Outcome: Ongoing (interventions implemented as appropriate)      Problem: Fall Risk (Adult)  Goal: Absence of Fall  Outcome: Ongoing (interventions implemented as appropriate)   07/08/18 1800   Fall Risk (Adult)   Absence of Fall making progress toward outcome       Problem: Pain, Acute (Adult)  Goal: Acceptable Pain Control/Comfort Level  Outcome: Ongoing (interventions implemented as appropriate)   07/08/18 1800   Pain, Acute (Adult)   Acceptable Pain Control/Comfort Level making progress toward outcome

## 2018-07-08 NOTE — CONSULTS
Thoracic surgery consult note  Chief complaint shortness of breath  This is an insulin-dependent diabetic who presents with a loculated left-sided pleural effusion and left lower lobe pneumonia as well as diabetic ketoacidosis.  The patient was transferred from an outside institution.  He is currently not on supplemental oxygen and is maintaining his saturations in the mid 90s.  He is in no respiratory distress.  He is afebrile.  He is clinically and hemodynamically stable.  His acidosis is improving.  His chest x-ray is examined by me in the radiology report is reviewed.  He does appear to have a loculated effusion that is unlikely to be drained by a smallbore chest catheter.  He is not having fevers chills or night sweats.  He has a minimally productive sputum.  He has been able to produce a sample.  He has been feeling ill for a week.  His past medical history surgical history medications allergies social history of all been reviewed as documented previously on the medical record.  Of greatest noticing it patient is an insulin-dependent diabetic.    Physical exam afebrile vital signs stable hemodynamically stable  Head normocephalic conjunctiva pink sclera anicteric neck is supple trachea is midline thyroid not enlarged no supraclavicular cervical lymphadenopathy no subcutaneous emphysema in the neck.  Clear breath sounds on the right.  Diminished breath sounds on the left especially posteriorly.  Bronchial breath sounds higher up.  I needs consistent with pneumonia and pleural effusion.  Cardiac regular rate and rhythm consistent with normal sinus rhythm.  S1 and S2 sounds normal.  PMI is not displaced.  Abdomen nondistended nontender no palpable hepatosplenomegaly.  Bowel sounds present.  No masses noted.  Extremities free of cyanosis clubbing or edema.  No gross joint deformity.  No evidence of DVT.  Neurological intact moving all extremities.  Normal mentation normal mood and affect  Skin no rash no  lesions  #1 loculated left effusion possible empyema #2 left lower lobe pneumonia with antibiotic therapy #3 diabetic ketoacidosis improving  We'll discuss with Dr. Oliver probable VATS decortication early next week  Patient is clinically stable.  I have personally examined the patient's chest CT radiographs and his chest x-ray and reviewed the radiology reports.

## 2018-07-08 NOTE — NURSING NOTE
Consent for chest tube placement given to pt to sign reports unable to read due to lack of glasses. Education sheet read to pt concerning risk and benefits. Denies questions pt oriented times three consent signed await dr west to place tube .

## 2018-07-08 NOTE — PLAN OF CARE
Problem: Patient Care Overview  Goal: Plan of Care Review  Outcome: Ongoing (interventions implemented as appropriate)   07/08/18 0533   Coping/Psychosocial   Plan of Care Reviewed With patient;significant other   OTHER   Outcome Summary continues to be in DKA current IVF infusing 150 cc/hr d51/2 ns with 20 KCL insulin currently at 3.5 units per hour remains on room air o2 sats 95 to 97 % very sensitive to finger sticks and needle sticks await 0400 returns BP good given loratab twice and IV morphine given twice consent for chest tube started on zithromax po and rocephin voids 1250 urine per urinal        Problem: Skin Injury Risk (Adult)  Goal: Identify Related Risk Factors and Signs and Symptoms  Outcome: Ongoing (interventions implemented as appropriate)    Goal: Skin Health and Integrity  Outcome: Ongoing (interventions implemented as appropriate)      Problem: Sepsis/Septic Shock (Adult)  Goal: Signs and Symptoms of Listed Potential Problems Will be Absent, Minimized or Managed (Sepsis/Septic Shock)  Outcome: Ongoing (interventions implemented as appropriate)      Problem: Diabetes, Type 2 (Adult)  Goal: Signs and Symptoms of Listed Potential Problems Will be Absent, Minimized or Managed (Diabetes, Type 2)  Outcome: Ongoing (interventions implemented as appropriate)      Problem: Fall Risk (Adult)  Goal: Absence of Fall  Outcome: Ongoing (interventions implemented as appropriate)      Problem: Pain, Acute (Adult)  Goal: Acceptable Pain Control/Comfort Level  Outcome: Ongoing (interventions implemented as appropriate)

## 2018-07-09 ENCOUNTER — ANESTHESIA (OUTPATIENT)
Dept: PERIOP | Facility: HOSPITAL | Age: 53
End: 2018-07-09

## 2018-07-09 ENCOUNTER — APPOINTMENT (OUTPATIENT)
Dept: GENERAL RADIOLOGY | Facility: HOSPITAL | Age: 53
End: 2018-07-09

## 2018-07-09 ENCOUNTER — ANESTHESIA EVENT (OUTPATIENT)
Dept: PERIOP | Facility: HOSPITAL | Age: 53
End: 2018-07-09

## 2018-07-09 PROBLEM — E11.10 DKA (DIABETIC KETOACIDOSES): Status: ACTIVE | Noted: 2018-07-09

## 2018-07-09 LAB
ABO GROUP BLD: NORMAL
ANION GAP SERPL CALCULATED.3IONS-SCNC: 18.3 MMOL/L
BLD GP AB SCN SERPL QL: NEGATIVE
BUN BLD-MCNC: 9 MG/DL (ref 6–20)
BUN/CREAT SERPL: 19.1 (ref 7–25)
CALCIUM SPEC-SCNC: 8.7 MG/DL (ref 8.6–10.5)
CHLORIDE SERPL-SCNC: 94 MMOL/L (ref 98–107)
CO2 SERPL-SCNC: 17.7 MMOL/L (ref 22–29)
CREAT BLD-MCNC: 0.47 MG/DL (ref 0.76–1.27)
DEPRECATED RDW RBC AUTO: 40.4 FL (ref 37–54)
ERYTHROCYTE [DISTWIDTH] IN BLOOD BY AUTOMATED COUNT: 13.4 % (ref 11.5–14.5)
GFR SERPL CREATININE-BSD FRML MDRD: >150 ML/MIN/1.73
GIE STN SPEC: NORMAL
GLUCOSE BLD-MCNC: 211 MG/DL (ref 65–99)
GLUCOSE BLDC GLUCOMTR-MCNC: 198 MG/DL (ref 70–130)
GLUCOSE BLDC GLUCOMTR-MCNC: 248 MG/DL (ref 70–130)
GLUCOSE BLDC GLUCOMTR-MCNC: 339 MG/DL (ref 70–130)
HCT VFR BLD AUTO: 35.7 % (ref 40.4–52.2)
HGB BLD-MCNC: 12.3 G/DL (ref 13.7–17.6)
MCH RBC QN AUTO: 28.1 PG (ref 27–32.7)
MCHC RBC AUTO-ENTMCNC: 34.5 G/DL (ref 32.6–36.4)
MCV RBC AUTO: 81.7 FL (ref 79.8–96.2)
PLATELET # BLD AUTO: 353 10*3/MM3 (ref 140–500)
PMV BLD AUTO: 9.4 FL (ref 6–12)
POTASSIUM BLD-SCNC: 3.5 MMOL/L (ref 3.5–5.2)
RBC # BLD AUTO: 4.37 10*6/MM3 (ref 4.6–6)
RH BLD: POSITIVE
SODIUM BLD-SCNC: 130 MMOL/L (ref 136–145)
T&S EXPIRATION DATE: NORMAL
WBC NRBC COR # BLD: 12.41 10*3/MM3 (ref 4.5–10.7)

## 2018-07-09 PROCEDURE — 86850 RBC ANTIBODY SCREEN: CPT | Performed by: THORACIC SURGERY (CARDIOTHORACIC VASCULAR SURGERY)

## 2018-07-09 PROCEDURE — 87206 SMEAR FLUORESCENT/ACID STAI: CPT | Performed by: THORACIC SURGERY (CARDIOTHORACIC VASCULAR SURGERY)

## 2018-07-09 PROCEDURE — 87116 MYCOBACTERIA CULTURE: CPT | Performed by: THORACIC SURGERY (CARDIOTHORACIC VASCULAR SURGERY)

## 2018-07-09 PROCEDURE — 87205 SMEAR GRAM STAIN: CPT | Performed by: INTERNAL MEDICINE

## 2018-07-09 PROCEDURE — 25010000002 PHENYLEPHRINE PER 1 ML: Performed by: NURSE ANESTHETIST, CERTIFIED REGISTERED

## 2018-07-09 PROCEDURE — 25010000002 FENTANYL CITRATE (PF) 100 MCG/2ML SOLUTION: Performed by: NURSE ANESTHETIST, CERTIFIED REGISTERED

## 2018-07-09 PROCEDURE — 25010000002 ONDANSETRON PER 1 MG: Performed by: NURSE ANESTHETIST, CERTIFIED REGISTERED

## 2018-07-09 PROCEDURE — 25010000002 PROPOFOL 10 MG/ML EMULSION: Performed by: NURSE ANESTHETIST, CERTIFIED REGISTERED

## 2018-07-09 PROCEDURE — 87015 SPECIMEN INFECT AGNT CONCNTJ: CPT | Performed by: THORACIC SURGERY (CARDIOTHORACIC VASCULAR SURGERY)

## 2018-07-09 PROCEDURE — 88305 TISSUE EXAM BY PATHOLOGIST: CPT | Performed by: THORACIC SURGERY (CARDIOTHORACIC VASCULAR SURGERY)

## 2018-07-09 PROCEDURE — 85027 COMPLETE CBC AUTOMATED: CPT | Performed by: INTERNAL MEDICINE

## 2018-07-09 PROCEDURE — 86900 BLOOD TYPING SEROLOGIC ABO: CPT | Performed by: THORACIC SURGERY (CARDIOTHORACIC VASCULAR SURGERY)

## 2018-07-09 PROCEDURE — 25010000002 HYDROMORPHONE PER 4 MG: Performed by: NURSE ANESTHETIST, CERTIFIED REGISTERED

## 2018-07-09 PROCEDURE — 82962 GLUCOSE BLOOD TEST: CPT

## 2018-07-09 PROCEDURE — 87176 TISSUE HOMOGENIZATION CULTR: CPT | Performed by: INTERNAL MEDICINE

## 2018-07-09 PROCEDURE — 25010000002 FENTANYL CITRATE (PF) 100 MCG/2ML SOLUTION: Performed by: INTERNAL MEDICINE

## 2018-07-09 PROCEDURE — 71045 X-RAY EXAM CHEST 1 VIEW: CPT

## 2018-07-09 PROCEDURE — 0BCG4ZZ EXTIRPATION OF MATTER FROM LEFT UPPER LUNG LOBE, PERCUTANEOUS ENDOSCOPIC APPROACH: ICD-10-PCS | Performed by: THORACIC SURGERY (CARDIOTHORACIC VASCULAR SURGERY)

## 2018-07-09 PROCEDURE — 25010000002 ENOXAPARIN PER 10 MG: Performed by: INTERNAL MEDICINE

## 2018-07-09 PROCEDURE — C1729 CATH, DRAINAGE: HCPCS | Performed by: THORACIC SURGERY (CARDIOTHORACIC VASCULAR SURGERY)

## 2018-07-09 PROCEDURE — 99232 SBSQ HOSP IP/OBS MODERATE 35: CPT | Performed by: THORACIC SURGERY (CARDIOTHORACIC VASCULAR SURGERY)

## 2018-07-09 PROCEDURE — 25010000002 SUCCINYLCHOLINE PER 20 MG: Performed by: NURSE ANESTHETIST, CERTIFIED REGISTERED

## 2018-07-09 PROCEDURE — 87075 CULTR BACTERIA EXCEPT BLOOD: CPT | Performed by: THORACIC SURGERY (CARDIOTHORACIC VASCULAR SURGERY)

## 2018-07-09 PROCEDURE — 87070 CULTURE OTHR SPECIMN AEROBIC: CPT | Performed by: THORACIC SURGERY (CARDIOTHORACIC VASCULAR SURGERY)

## 2018-07-09 PROCEDURE — 25010000002 HYDROMORPHONE PER 4 MG: Performed by: INTERNAL MEDICINE

## 2018-07-09 PROCEDURE — 0BJ08ZZ INSPECTION OF TRACHEOBRONCHIAL TREE, VIA NATURAL OR ARTIFICIAL OPENING ENDOSCOPIC: ICD-10-PCS | Performed by: THORACIC SURGERY (CARDIOTHORACIC VASCULAR SURGERY)

## 2018-07-09 PROCEDURE — 0BCP4ZZ EXTIRPATION OF MATTER FROM LEFT PLEURA, PERCUTANEOUS ENDOSCOPIC APPROACH: ICD-10-PCS | Performed by: THORACIC SURGERY (CARDIOTHORACIC VASCULAR SURGERY)

## 2018-07-09 PROCEDURE — 80048 BASIC METABOLIC PNL TOTAL CA: CPT | Performed by: INTERNAL MEDICINE

## 2018-07-09 PROCEDURE — 25010000002 KETOROLAC TROMETHAMINE PER 15 MG: Performed by: THORACIC SURGERY (CARDIOTHORACIC VASCULAR SURGERY)

## 2018-07-09 PROCEDURE — 25010000002 VANCOMYCIN PER 500 MG

## 2018-07-09 PROCEDURE — 0BCJ4ZZ EXTIRPATION OF MATTER FROM LEFT LOWER LUNG LOBE, PERCUTANEOUS ENDOSCOPIC APPROACH: ICD-10-PCS | Performed by: THORACIC SURGERY (CARDIOTHORACIC VASCULAR SURGERY)

## 2018-07-09 PROCEDURE — 87205 SMEAR GRAM STAIN: CPT | Performed by: THORACIC SURGERY (CARDIOTHORACIC VASCULAR SURGERY)

## 2018-07-09 PROCEDURE — 25010000002 KETOROLAC TROMETHAMINE PER 15 MG: Performed by: INTERNAL MEDICINE

## 2018-07-09 PROCEDURE — 63710000001 INSULIN ASPART PER 5 UNITS: Performed by: INTERNAL MEDICINE

## 2018-07-09 PROCEDURE — 87102 FUNGUS ISOLATION CULTURE: CPT | Performed by: THORACIC SURGERY (CARDIOTHORACIC VASCULAR SURGERY)

## 2018-07-09 PROCEDURE — 25010000002 CEFTRIAXONE PER 250 MG: Performed by: INTERNAL MEDICINE

## 2018-07-09 PROCEDURE — 86901 BLOOD TYPING SEROLOGIC RH(D): CPT | Performed by: THORACIC SURGERY (CARDIOTHORACIC VASCULAR SURGERY)

## 2018-07-09 PROCEDURE — 63710000001 INSULIN DETEMER PER 5 UNITS: Performed by: INTERNAL MEDICINE

## 2018-07-09 PROCEDURE — 63710000001 INSULIN DETEMIR PER 5 UNITS: Performed by: INTERNAL MEDICINE

## 2018-07-09 PROCEDURE — 87070 CULTURE OTHR SPECIMN AEROBIC: CPT | Performed by: INTERNAL MEDICINE

## 2018-07-09 PROCEDURE — 32652 THORACOSCOPY REM TOTL CORTEX: CPT | Performed by: THORACIC SURGERY (CARDIOTHORACIC VASCULAR SURGERY)

## 2018-07-09 PROCEDURE — 25010000002 KETOROLAC TROMETHAMINE PER 15 MG: Performed by: NURSE ANESTHETIST, CERTIFIED REGISTERED

## 2018-07-09 PROCEDURE — 25010000002 MIDAZOLAM PER 1 MG: Performed by: ANESTHESIOLOGY

## 2018-07-09 RX ORDER — FENTANYL CITRATE 50 UG/ML
50 INJECTION, SOLUTION INTRAMUSCULAR; INTRAVENOUS
Status: DISCONTINUED | OUTPATIENT
Start: 2018-07-09 | End: 2018-07-09 | Stop reason: HOSPADM

## 2018-07-09 RX ORDER — ONDANSETRON 2 MG/ML
4 INJECTION INTRAMUSCULAR; INTRAVENOUS ONCE AS NEEDED
Status: DISCONTINUED | OUTPATIENT
Start: 2018-07-09 | End: 2018-07-09 | Stop reason: HOSPADM

## 2018-07-09 RX ORDER — SODIUM CHLORIDE, SODIUM LACTATE, POTASSIUM CHLORIDE, CALCIUM CHLORIDE 600; 310; 30; 20 MG/100ML; MG/100ML; MG/100ML; MG/100ML
9 INJECTION, SOLUTION INTRAVENOUS CONTINUOUS
Status: DISCONTINUED | OUTPATIENT
Start: 2018-07-09 | End: 2018-07-13

## 2018-07-09 RX ORDER — ACETAMINOPHEN 325 MG/1
650 TABLET ORAL EVERY 4 HOURS PRN
Status: DISCONTINUED | OUTPATIENT
Start: 2018-07-09 | End: 2018-07-15 | Stop reason: HOSPADM

## 2018-07-09 RX ORDER — HYDROCODONE BITARTRATE AND ACETAMINOPHEN 7.5; 325 MG/1; MG/1
2 TABLET ORAL EVERY 4 HOURS PRN
Status: DISCONTINUED | OUTPATIENT
Start: 2018-07-09 | End: 2018-07-15 | Stop reason: HOSPADM

## 2018-07-09 RX ORDER — SUCCINYLCHOLINE CHLORIDE 20 MG/ML
INJECTION INTRAMUSCULAR; INTRAVENOUS AS NEEDED
Status: DISCONTINUED | OUTPATIENT
Start: 2018-07-09 | End: 2018-07-09 | Stop reason: SURG

## 2018-07-09 RX ORDER — KETOROLAC TROMETHAMINE 30 MG/ML
30 INJECTION, SOLUTION INTRAMUSCULAR; INTRAVENOUS ONCE
Status: COMPLETED | OUTPATIENT
Start: 2018-07-09 | End: 2018-07-09

## 2018-07-09 RX ORDER — HYDROMORPHONE HCL 110MG/55ML
PATIENT CONTROLLED ANALGESIA SYRINGE INTRAVENOUS AS NEEDED
Status: DISCONTINUED | OUTPATIENT
Start: 2018-07-09 | End: 2018-07-09 | Stop reason: SURG

## 2018-07-09 RX ORDER — IPRATROPIUM BROMIDE AND ALBUTEROL SULFATE 2.5; .5 MG/3ML; MG/3ML
3 SOLUTION RESPIRATORY (INHALATION)
Status: DISPENSED | OUTPATIENT
Start: 2018-07-09 | End: 2018-07-11

## 2018-07-09 RX ORDER — SODIUM CHLORIDE 0.9 % (FLUSH) 0.9 %
1-10 SYRINGE (ML) INJECTION AS NEEDED
Status: DISCONTINUED | OUTPATIENT
Start: 2018-07-09 | End: 2018-07-09 | Stop reason: HOSPADM

## 2018-07-09 RX ORDER — HYDROCODONE BITARTRATE AND ACETAMINOPHEN 7.5; 325 MG/1; MG/1
1 TABLET ORAL ONCE AS NEEDED
Status: DISCONTINUED | OUTPATIENT
Start: 2018-07-09 | End: 2018-07-09 | Stop reason: HOSPADM

## 2018-07-09 RX ORDER — SODIUM CHLORIDE 9 MG/ML
75 INJECTION, SOLUTION INTRAVENOUS CONTINUOUS
Status: ACTIVE | OUTPATIENT
Start: 2018-07-09 | End: 2018-07-10

## 2018-07-09 RX ORDER — FLUMAZENIL 0.1 MG/ML
0.2 INJECTION INTRAVENOUS AS NEEDED
Status: DISCONTINUED | OUTPATIENT
Start: 2018-07-09 | End: 2018-07-09 | Stop reason: HOSPADM

## 2018-07-09 RX ORDER — PROMETHAZINE HYDROCHLORIDE 25 MG/1
25 SUPPOSITORY RECTAL ONCE AS NEEDED
Status: DISCONTINUED | OUTPATIENT
Start: 2018-07-09 | End: 2018-07-09 | Stop reason: HOSPADM

## 2018-07-09 RX ORDER — PROMETHAZINE HYDROCHLORIDE 25 MG/1
12.5 TABLET ORAL ONCE AS NEEDED
Status: DISCONTINUED | OUTPATIENT
Start: 2018-07-09 | End: 2018-07-09 | Stop reason: HOSPADM

## 2018-07-09 RX ORDER — HYDROCODONE BITARTRATE AND ACETAMINOPHEN 7.5; 325 MG/1; MG/1
1 TABLET ORAL EVERY 4 HOURS PRN
Status: DISCONTINUED | OUTPATIENT
Start: 2018-07-09 | End: 2018-07-15 | Stop reason: HOSPADM

## 2018-07-09 RX ORDER — BISACODYL 10 MG
10 SUPPOSITORY, RECTAL RECTAL DAILY PRN
Status: DISCONTINUED | OUTPATIENT
Start: 2018-07-09 | End: 2018-07-15 | Stop reason: HOSPADM

## 2018-07-09 RX ORDER — DIPHENHYDRAMINE HYDROCHLORIDE 50 MG/ML
12.5 INJECTION INTRAMUSCULAR; INTRAVENOUS
Status: DISCONTINUED | OUTPATIENT
Start: 2018-07-09 | End: 2018-07-09 | Stop reason: HOSPADM

## 2018-07-09 RX ORDER — MIDAZOLAM HYDROCHLORIDE 1 MG/ML
2 INJECTION INTRAMUSCULAR; INTRAVENOUS
Status: DISCONTINUED | OUTPATIENT
Start: 2018-07-09 | End: 2018-07-09 | Stop reason: HOSPADM

## 2018-07-09 RX ORDER — FENTANYL CITRATE 50 UG/ML
INJECTION, SOLUTION INTRAMUSCULAR; INTRAVENOUS AS NEEDED
Status: DISCONTINUED | OUTPATIENT
Start: 2018-07-09 | End: 2018-07-09 | Stop reason: SURG

## 2018-07-09 RX ORDER — KETOROLAC TROMETHAMINE 30 MG/ML
30 INJECTION, SOLUTION INTRAMUSCULAR; INTRAVENOUS EVERY 6 HOURS SCHEDULED
Status: COMPLETED | OUTPATIENT
Start: 2018-07-09 | End: 2018-07-11

## 2018-07-09 RX ORDER — PROPOFOL 10 MG/ML
VIAL (ML) INTRAVENOUS AS NEEDED
Status: DISCONTINUED | OUTPATIENT
Start: 2018-07-09 | End: 2018-07-09 | Stop reason: SURG

## 2018-07-09 RX ORDER — ESMOLOL HYDROCHLORIDE 10 MG/ML
INJECTION INTRAVENOUS AS NEEDED
Status: DISCONTINUED | OUTPATIENT
Start: 2018-07-09 | End: 2018-07-09 | Stop reason: SURG

## 2018-07-09 RX ORDER — ONDANSETRON 2 MG/ML
INJECTION INTRAMUSCULAR; INTRAVENOUS AS NEEDED
Status: DISCONTINUED | OUTPATIENT
Start: 2018-07-09 | End: 2018-07-09 | Stop reason: SURG

## 2018-07-09 RX ORDER — SODIUM CHLORIDE 0.9 % (FLUSH) 0.9 %
1-10 SYRINGE (ML) INJECTION AS NEEDED
Status: DISCONTINUED | OUTPATIENT
Start: 2018-07-09 | End: 2018-07-15 | Stop reason: HOSPADM

## 2018-07-09 RX ORDER — OXYCODONE AND ACETAMINOPHEN 7.5; 325 MG/1; MG/1
1 TABLET ORAL ONCE AS NEEDED
Status: DISCONTINUED | OUTPATIENT
Start: 2018-07-09 | End: 2018-07-09 | Stop reason: HOSPADM

## 2018-07-09 RX ORDER — ONDANSETRON 4 MG/1
4 TABLET, ORALLY DISINTEGRATING ORAL EVERY 6 HOURS PRN
Status: DISCONTINUED | OUTPATIENT
Start: 2018-07-09 | End: 2018-07-15 | Stop reason: HOSPADM

## 2018-07-09 RX ORDER — ONDANSETRON 4 MG/1
4 TABLET, FILM COATED ORAL EVERY 6 HOURS PRN
Status: DISCONTINUED | OUTPATIENT
Start: 2018-07-09 | End: 2018-07-15 | Stop reason: HOSPADM

## 2018-07-09 RX ORDER — LABETALOL HYDROCHLORIDE 5 MG/ML
5 INJECTION, SOLUTION INTRAVENOUS
Status: DISCONTINUED | OUTPATIENT
Start: 2018-07-09 | End: 2018-07-09 | Stop reason: HOSPADM

## 2018-07-09 RX ORDER — MORPHINE SULFATE 2 MG/ML
4 INJECTION, SOLUTION INTRAMUSCULAR; INTRAVENOUS EVERY 4 HOURS PRN
Status: DISCONTINUED | OUTPATIENT
Start: 2018-07-09 | End: 2018-07-15 | Stop reason: HOSPADM

## 2018-07-09 RX ORDER — FAMOTIDINE 10 MG/ML
20 INJECTION, SOLUTION INTRAVENOUS ONCE
Status: COMPLETED | OUTPATIENT
Start: 2018-07-09 | End: 2018-07-09

## 2018-07-09 RX ORDER — PROMETHAZINE HYDROCHLORIDE 25 MG/ML
12.5 INJECTION, SOLUTION INTRAMUSCULAR; INTRAVENOUS ONCE AS NEEDED
Status: DISCONTINUED | OUTPATIENT
Start: 2018-07-09 | End: 2018-07-09 | Stop reason: HOSPADM

## 2018-07-09 RX ORDER — ONDANSETRON 2 MG/ML
4 INJECTION INTRAMUSCULAR; INTRAVENOUS EVERY 6 HOURS PRN
Status: DISCONTINUED | OUTPATIENT
Start: 2018-07-09 | End: 2018-07-15 | Stop reason: HOSPADM

## 2018-07-09 RX ORDER — MAGNESIUM HYDROXIDE 1200 MG/15ML
LIQUID ORAL AS NEEDED
Status: DISCONTINUED | OUTPATIENT
Start: 2018-07-09 | End: 2018-07-09 | Stop reason: HOSPADM

## 2018-07-09 RX ORDER — SENNA AND DOCUSATE SODIUM 50; 8.6 MG/1; MG/1
2 TABLET, FILM COATED ORAL NIGHTLY
Status: DISCONTINUED | OUTPATIENT
Start: 2018-07-09 | End: 2018-07-15 | Stop reason: HOSPADM

## 2018-07-09 RX ORDER — MORPHINE SULFATE 2 MG/ML
2 INJECTION, SOLUTION INTRAMUSCULAR; INTRAVENOUS EVERY 4 HOURS PRN
Status: DISCONTINUED | OUTPATIENT
Start: 2018-07-09 | End: 2018-07-15 | Stop reason: HOSPADM

## 2018-07-09 RX ORDER — DOCUSATE SODIUM 100 MG/1
100 CAPSULE, LIQUID FILLED ORAL 2 TIMES DAILY
Status: DISCONTINUED | OUTPATIENT
Start: 2018-07-09 | End: 2018-07-15 | Stop reason: HOSPADM

## 2018-07-09 RX ORDER — KETOROLAC TROMETHAMINE 30 MG/ML
INJECTION, SOLUTION INTRAMUSCULAR; INTRAVENOUS AS NEEDED
Status: DISCONTINUED | OUTPATIENT
Start: 2018-07-09 | End: 2018-07-09 | Stop reason: SURG

## 2018-07-09 RX ORDER — SODIUM CHLORIDE 9 MG/ML
75 INJECTION, SOLUTION INTRAVENOUS ONCE
Status: COMPLETED | OUTPATIENT
Start: 2018-07-10 | End: 2018-07-10

## 2018-07-09 RX ORDER — ROCURONIUM BROMIDE 10 MG/ML
INJECTION, SOLUTION INTRAVENOUS AS NEEDED
Status: DISCONTINUED | OUTPATIENT
Start: 2018-07-09 | End: 2018-07-09 | Stop reason: SURG

## 2018-07-09 RX ORDER — LIDOCAINE HYDROCHLORIDE 10 MG/ML
0.5 INJECTION, SOLUTION EPIDURAL; INFILTRATION; INTRACAUDAL; PERINEURAL ONCE AS NEEDED
Status: DISCONTINUED | OUTPATIENT
Start: 2018-07-09 | End: 2018-07-09 | Stop reason: HOSPADM

## 2018-07-09 RX ORDER — NITROGLYCERIN 0.4 MG/1
0.4 TABLET SUBLINGUAL
Status: DISCONTINUED | OUTPATIENT
Start: 2018-07-09 | End: 2018-07-15 | Stop reason: HOSPADM

## 2018-07-09 RX ORDER — MIDAZOLAM HYDROCHLORIDE 1 MG/ML
1 INJECTION INTRAMUSCULAR; INTRAVENOUS
Status: DISCONTINUED | OUTPATIENT
Start: 2018-07-09 | End: 2018-07-09 | Stop reason: HOSPADM

## 2018-07-09 RX ORDER — NALOXONE HCL 0.4 MG/ML
0.4 VIAL (ML) INJECTION
Status: DISCONTINUED | OUTPATIENT
Start: 2018-07-09 | End: 2018-07-15 | Stop reason: HOSPADM

## 2018-07-09 RX ORDER — LIDOCAINE HYDROCHLORIDE 20 MG/ML
INJECTION, SOLUTION INFILTRATION; PERINEURAL AS NEEDED
Status: DISCONTINUED | OUTPATIENT
Start: 2018-07-09 | End: 2018-07-09 | Stop reason: SURG

## 2018-07-09 RX ORDER — EPHEDRINE SULFATE 50 MG/ML
5 INJECTION, SOLUTION INTRAVENOUS ONCE AS NEEDED
Status: DISCONTINUED | OUTPATIENT
Start: 2018-07-09 | End: 2018-07-09 | Stop reason: HOSPADM

## 2018-07-09 RX ORDER — NALOXONE HCL 0.4 MG/ML
0.2 VIAL (ML) INJECTION AS NEEDED
Status: DISCONTINUED | OUTPATIENT
Start: 2018-07-09 | End: 2018-07-09 | Stop reason: HOSPADM

## 2018-07-09 RX ORDER — PROMETHAZINE HYDROCHLORIDE 25 MG/1
25 TABLET ORAL ONCE AS NEEDED
Status: DISCONTINUED | OUTPATIENT
Start: 2018-07-09 | End: 2018-07-09 | Stop reason: HOSPADM

## 2018-07-09 RX ADMIN — KETOROLAC TROMETHAMINE 30 MG: 30 INJECTION, SOLUTION INTRAMUSCULAR at 01:32

## 2018-07-09 RX ADMIN — KETOROLAC TROMETHAMINE 30 MG: 30 INJECTION, SOLUTION INTRAMUSCULAR; INTRAVENOUS at 20:00

## 2018-07-09 RX ADMIN — HYDROMORPHONE HYDROCHLORIDE 0.5 MG: 10 INJECTION, SOLUTION INTRAMUSCULAR; INTRAVENOUS; SUBCUTANEOUS at 16:25

## 2018-07-09 RX ADMIN — MIDAZOLAM HYDROCHLORIDE 2 MG: 2 INJECTION, SOLUTION INTRAMUSCULAR; INTRAVENOUS at 10:37

## 2018-07-09 RX ADMIN — FENTANYL CITRATE 50 MCG: 50 INJECTION, SOLUTION INTRAMUSCULAR; INTRAVENOUS at 14:00

## 2018-07-09 RX ADMIN — PHENYLEPHRINE HYDROCHLORIDE 100 MCG: 10 INJECTION INTRAVENOUS at 11:52

## 2018-07-09 RX ADMIN — INSULIN ASPART 7 UNITS: 100 INJECTION, SOLUTION INTRAVENOUS; SUBCUTANEOUS at 22:04

## 2018-07-09 RX ADMIN — ROCURONIUM BROMIDE 45 MG: 10 INJECTION INTRAVENOUS at 11:15

## 2018-07-09 RX ADMIN — ENOXAPARIN SODIUM 40 MG: 40 INJECTION SUBCUTANEOUS at 00:43

## 2018-07-09 RX ADMIN — FENTANYL CITRATE 100 MCG: 50 INJECTION, SOLUTION INTRAMUSCULAR; INTRAVENOUS at 11:15

## 2018-07-09 RX ADMIN — FENTANYL CITRATE 100 MCG: 50 INJECTION, SOLUTION INTRAMUSCULAR; INTRAVENOUS at 10:37

## 2018-07-09 RX ADMIN — HYDROCODONE BITARTRATE AND ACETAMINOPHEN 1 TABLET: 10; 325 TABLET ORAL at 05:25

## 2018-07-09 RX ADMIN — ESMOLOL HYDROCHLORIDE 10 MG: 10 INJECTION, SOLUTION INTRAVENOUS at 12:19

## 2018-07-09 RX ADMIN — FENTANYL CITRATE 50 MCG: 50 INJECTION, SOLUTION INTRAMUSCULAR; INTRAVENOUS at 15:10

## 2018-07-09 RX ADMIN — LABETALOL HYDROCHLORIDE 5 MG: 5 INJECTION, SOLUTION INTRAVENOUS at 14:20

## 2018-07-09 RX ADMIN — HYDROMORPHONE HYDROCHLORIDE 1 MG: 2 INJECTION INTRAMUSCULAR; INTRAVENOUS; SUBCUTANEOUS at 11:40

## 2018-07-09 RX ADMIN — ONDANSETRON 4 MG: 2 INJECTION INTRAMUSCULAR; INTRAVENOUS at 13:06

## 2018-07-09 RX ADMIN — SUCCINYLCHOLINE CHLORIDE 100 MG: 20 INJECTION, SOLUTION INTRAMUSCULAR; INTRAVENOUS; PARENTERAL at 11:07

## 2018-07-09 RX ADMIN — FENTANYL CITRATE 50 MCG: 50 INJECTION, SOLUTION INTRAMUSCULAR; INTRAVENOUS at 14:09

## 2018-07-09 RX ADMIN — SODIUM CHLORIDE 75 ML/HR: 9 INJECTION, SOLUTION INTRAVENOUS at 15:24

## 2018-07-09 RX ADMIN — FENTANYL CITRATE 50 MCG: 50 INJECTION, SOLUTION INTRAMUSCULAR; INTRAVENOUS at 14:39

## 2018-07-09 RX ADMIN — ESMOLOL HYDROCHLORIDE 10 MG: 10 INJECTION, SOLUTION INTRAVENOUS at 12:04

## 2018-07-09 RX ADMIN — LIDOCAINE HYDROCHLORIDE 100 MG: 20 INJECTION, SOLUTION INFILTRATION; PERINEURAL at 11:07

## 2018-07-09 RX ADMIN — INSULIN DETEMIR 15 UNITS: 100 INJECTION, SOLUTION SUBCUTANEOUS at 22:05

## 2018-07-09 RX ADMIN — KETOROLAC TROMETHAMINE 30 MG: 30 INJECTION, SOLUTION INTRAMUSCULAR at 17:25

## 2018-07-09 RX ADMIN — HYDROMORPHONE HYDROCHLORIDE 0.5 MG: 1 INJECTION, SOLUTION INTRAMUSCULAR; INTRAVENOUS; SUBCUTANEOUS at 15:36

## 2018-07-09 RX ADMIN — HYDROCODONE BITARTRATE AND ACETAMINOPHEN 1 TABLET: 7.5; 325 TABLET ORAL at 21:33

## 2018-07-09 RX ADMIN — PROPOFOL 200 MG: 10 INJECTION, EMULSION INTRAVENOUS at 11:07

## 2018-07-09 RX ADMIN — HYDROMORPHONE HYDROCHLORIDE 1 MG: 2 INJECTION INTRAMUSCULAR; INTRAVENOUS; SUBCUTANEOUS at 13:15

## 2018-07-09 RX ADMIN — SUGAMMADEX 200 MG: 100 INJECTION, SOLUTION INTRAVENOUS at 13:10

## 2018-07-09 RX ADMIN — KETOROLAC TROMETHAMINE 30 MG: 30 INJECTION, SOLUTION INTRAMUSCULAR; INTRAVENOUS at 11:35

## 2018-07-09 RX ADMIN — ROCURONIUM BROMIDE 20 MG: 10 INJECTION INTRAVENOUS at 11:59

## 2018-07-09 RX ADMIN — HYDROMORPHONE HYDROCHLORIDE 0.5 MG: 1 INJECTION, SOLUTION INTRAMUSCULAR; INTRAVENOUS; SUBCUTANEOUS at 14:33

## 2018-07-09 RX ADMIN — HYDROMORPHONE HYDROCHLORIDE 0.5 MG: 1 INJECTION, SOLUTION INTRAMUSCULAR; INTRAVENOUS; SUBCUTANEOUS at 15:22

## 2018-07-09 RX ADMIN — METOPROLOL TARTRATE 25 MG: 25 TABLET ORAL at 20:37

## 2018-07-09 RX ADMIN — ESMOLOL HYDROCHLORIDE 10 MG: 10 INJECTION, SOLUTION INTRAVENOUS at 12:47

## 2018-07-09 RX ADMIN — FENTANYL CITRATE 150 MCG: 50 INJECTION, SOLUTION INTRAMUSCULAR; INTRAVENOUS at 11:00

## 2018-07-09 RX ADMIN — HYDROMORPHONE HYDROCHLORIDE 0.5 MG: 10 INJECTION, SOLUTION INTRAMUSCULAR; INTRAVENOUS; SUBCUTANEOUS at 20:39

## 2018-07-09 RX ADMIN — CEFTRIAXONE SODIUM 1 G: 1 INJECTION, SOLUTION INTRAVENOUS at 00:42

## 2018-07-09 RX ADMIN — FAMOTIDINE 20 MG: 10 INJECTION, SOLUTION INTRAVENOUS at 10:37

## 2018-07-09 RX ADMIN — HYDROMORPHONE HYDROCHLORIDE 0.5 MG: 10 INJECTION, SOLUTION INTRAMUSCULAR; INTRAVENOUS; SUBCUTANEOUS at 00:44

## 2018-07-09 RX ADMIN — ROCURONIUM BROMIDE 20 MG: 10 INJECTION INTRAVENOUS at 12:46

## 2018-07-09 RX ADMIN — HYDROMORPHONE HYDROCHLORIDE 0.5 MG: 10 INJECTION, SOLUTION INTRAMUSCULAR; INTRAVENOUS; SUBCUTANEOUS at 08:46

## 2018-07-09 RX ADMIN — ROCURONIUM BROMIDE 5 MG: 10 INJECTION INTRAVENOUS at 11:07

## 2018-07-09 RX ADMIN — INSULIN DETEMIR 15 UNITS: 100 INJECTION, SOLUTION SUBCUTANEOUS at 09:04

## 2018-07-09 RX ADMIN — HYDROMORPHONE HYDROCHLORIDE 0.5 MG: 1 INJECTION, SOLUTION INTRAMUSCULAR; INTRAVENOUS; SUBCUTANEOUS at 14:19

## 2018-07-09 RX ADMIN — HYDROCODONE BITARTRATE AND ACETAMINOPHEN 2 TABLET: 7.5; 325 TABLET ORAL at 17:25

## 2018-07-09 RX ADMIN — AZITHROMYCIN 500 MG: 250 TABLET, FILM COATED ORAL at 09:04

## 2018-07-09 RX ADMIN — INSULIN ASPART 4 UNITS: 100 INJECTION, SOLUTION INTRAVENOUS; SUBCUTANEOUS at 17:25

## 2018-07-09 NOTE — ANESTHESIA POSTPROCEDURE EVALUATION
Patient: Myles Toledo    Procedure Summary     Date:  07/09/18 Room / Location:  [facility] OR [address] [place] [address] MAIN OR    Anesthesia Start:  1055 Anesthesia Stop:  1341    Procedure:  BRONCHOSCOPY, LEFT VIDEO ASSISTED THORACOSCOPY WITH DECORITCATION, INTERCOSTAL NERVE BLOCK WITH CRYO ABLATION (Left Chest) Diagnosis:      Surgeon:  Thomas Oliver III, MD Provider:  Avery Pritchard MD    Anesthesia Type:  general ASA Status:  3          Anesthesia Type: general  Last vitals  BP   123/90 (07/09/18 1519)   Temp   36.9 °C (98.4 °F) (07/09/18 1337)   Pulse   105 (07/09/18 1519)   Resp   16 (07/09/18 1519)     SpO2   96 % (07/09/18 1519)     Post Anesthesia Care and Evaluation    Patient location during evaluation: PACU  Level of consciousness: awake and alert  Anesthetic complications: No anesthetic complications

## 2018-07-09 NOTE — NURSING NOTE
"While  Conducting pain assessment pt reports \"bought a percocet off a friend .\" last week. Pt reports has been having pain all week long. It seems that pt is having more required need for pain medication does not believe that his pain is worse tonight than last night. Does not think morphine IVP was better than the  Dilaudid currently being given. IV pain medication being given.   "

## 2018-07-09 NOTE — NURSING NOTE
Lab called to say blood is hemolyzed isatu nurse asked to recollect or stick attempt to recollect

## 2018-07-09 NOTE — OP NOTE
THORACOSCOPY VIDEO ASSISTED  Progress Note    Myles Toledo  7/7/2018 - 7/9/2018    Pre-op Diagnosis:   Complex parapneumonic effusion with trapped lung       Post-Op Diagnosis Codes:   Complex parapneumonic effusion with trapped lung    Procedure/CPT® Codes:      Procedure(s):  BRONCHOSCOPY, LEFT VIDEO ASSISTED THORACOSCOPY WITH DECORITCATION, INTERCOSTAL NERVE BLOCK WITH CRYO ABLATION    Surgeon(s):  Thomas Oliver III, MD    Anesthesia: General    Staff:   Cell Saver : Wicho Zacarias  Circulator: Marci Lobo RN; Dari Alvarado RN  Scrub Person: Laine Irizarry; Alicia Ray  Assistant: JANETH Charles    Estimated Blood Loss: 200 mL    Urine Voided: * No values recorded between 7/9/2018 10:52 AM and 7/9/2018  1:23 PM *    Specimens:                ID Type Source Tests Collected by Time   1 : left pleural fluid  Tissue Pleural Cavity FUNGAL CULTURE, TISSUE / BONE CULTURE, AFB CULTURE, FUNGUS SMEAR Thomas Oliver III, MD 7/9/2018 1144   2 : left pleural fluid  Body Fluid Pleural Cavity FUNGAL CULTURE, BODY FLUID CULTURE, AFB CULTURE, FUNGUS SMEAR Thomas Oliver III, MD 7/9/2018 1153   3 : left pleural peel, left visceral pleura Tissue Pleural Cavity ANAEROBIC CULTURE, FUNGAL CULTURE, AFB CULTURE, FUNGUS SMEAR Thomas Oliver III, MD 7/9/2018 1204   A : left pleura Tissue Pleura TISSUE PATHOLOGY EXAM Thomas Oliver III, MD 7/9/2018 1249         Drains:   Chest Tube 1 Left (Active)       Chest Tube 2 Left (Active)       Chest Tube 3 Left (Active)       Urethral Catheter Silicone 16 Fr. (Active)       Findings: Multiloculated pleural effusion.  Fluid was serosanguineous.  Fluid was sent for cultures.  Entire lung was decorticated.  Heaviest area of pleural thickening was over the lower lobe.  No signs for cancer.  Some consolidation of the lower lobe.    Complications: none    Summary of procedure: Mr Toledo was brought to the operating room and placed on the operating table in  the supine position.  Following the induction of adequate general endotracheal anesthesia, the flexible bronchoscope was passed down the endotracheal tube.  Distal trachea and abdirashid appeared normal.  Abdirashid was sharp and nondisplaced.  Right mainstem bronchus, right upper lobe, right middle lobe, and right lower lobe bronchi showed no endobronchial lesions or mucosal abnormalities.  Scope was then passed down the left main bronchus.  Left upper lobe and left lower lobe bronchi showed no endobronchial lesions or mucosal abnormalities.  The scope was then used to position the double-lumen tube in the left main bronchus.  Once the tube was in good position, the patient was turned into the right lateral decubitus position.  All pressure points were padded.  A roll was placed in the right axilla.  Patient was secured to the operating table with 3 inch adhesive tape and Velcro safety strap.  The left chest was prepped and draped in usual sterile manner.  The left lung was deflated.    3 port sites were created.  The scope was introduced into the pleural space.  There was fibrinous debris throughout the pleural space.  Loculations were broken up and approximately 700 cc of serosanguineous fluid was aspirated out of the pleural space.  Some of this fluid was sent for cultures.  Debris was removed from the chest wall.  Dissection was carried out in the interlobar fissure were more fluid was removed.  Decortication was begun and the upper lobe was completely mobilized and decorticated.  Further dissection was carried out over the lower lobe.  Extensive pleural thickening over the anterior lateral aspect of the lower lobe as well as along the diaphragm.  The lower lobe was completely decorticated and mobilized.  Chest was irrigated out with antibiotic solution.  Platelet rich plasma mixed with vancomycin was sprayed over the diaphragmatic  surface of the lung and over the lung itself.  Intercostal nerve blocks were performed  with the cryoprobe.  3 chest tubes were placed in the pleural space.  Each tube was brought out through one of the 3 port sites and secured to the chest wall with sutures 0 silk.  Each port site was closed in layers with 2-0 Vicryl and the skin with 4-0 Vicryl.  Dry sterile dressings were applied.    The patient was awakened and extubated in the operating room and transported to the recovery room in satisfactory condition having tolerated the procedure well.  Sponge instrument and needle counts were correct at the end of the procedure      .        Dictated utilizing Dragon dictation    Thomas Oliver III, MD     Date: 7/9/2018  Time: 1:23 PM

## 2018-07-09 NOTE — PROGRESS NOTES
"    Chief Complaint: Pneumonia with complex parapneumonic effusion and trapped lung      Subjective:  Symptoms:  Stable.  He reports shortness of breath, malaise and chest pain.    Diet:  NPO.  No nausea or vomiting.    Activity level: Impaired due to weakness.    Pain:  He complains of pain that is mild.  Pain is well controlled.        Vital Signs:  Temp:  [98.8 °F (37.1 °C)-100.6 °F (38.1 °C)] 99.1 °F (37.3 °C)  Heart Rate:  [] 106  Resp:  [24] 24  BP: (112-144)/(72-94) 144/86    Intake & Output (last day)       07/08 0701 - 07/09 0700 07/09 0701 - 07/10 0700    P.O. 240     I.V. (mL/kg) 2168.7 (20.3)     Total Intake(mL/kg) 2408.7 (22.5)     Urine (mL/kg/hr) 2400 (0.9)     Total Output 2400      Net +8.7                  Objective:  General Appearance:  Ill-appearing, uncomfortable and in no acute distress.    Vital signs: (most recent): Blood pressure 144/86, pulse 106, temperature 99.1 °F (37.3 °C), temperature source Oral, resp. rate 24, height 172.7 cm (68\"), weight 107 kg (235 lb 0.2 oz), SpO2 93 %.  Fever.    Output: Producing urine.    Lungs:  Normal effort and normal respiratory rate.  There are decreased breath sounds.    Heart: Normal rate.  Regular rhythm.  No murmur.   Abdomen: Abdomen is soft.  Bowel sounds are normal.   There is no abdominal tenderness.   There is no mass.   Extremities: There is no dependent edema.    Neurological: Patient is alert and oriented to person, place and time.  Normal strength.              Results Review:     I reviewed the patient's new clinical results.  I reviewed the patient's new imaging results and agree with the interpretation.    Imaging Results (last 24 hours)     ** No results found for the last 24 hours. **          Lab Results:     Lab Results (last 24 hours)     Procedure Component Value Units Date/Time    Basic Metabolic Panel [[id number]] Updated:  07/09/18 0649    Specimen:  Blood     CBC (No Diff) [[id number]]  (Abnormal) Collected:  07/09/18 0605 "    Specimen:  Blood Updated:  07/09/18 0623     WBC 12.41 (H) 10*3/mm3      RBC 4.37 (L) 10*6/mm3      Hemoglobin 12.3 (L) g/dL      Hematocrit 35.7 (L) %      MCV 81.7 fL      MCH 28.1 pg      MCHC 34.5 g/dL      RDW 13.4 %      RDW-SD 40.4 fl      MPV 9.4 fL      Platelets 353 10*3/mm3     Respiratory Culture - Sputum, Cough [[id number]] Collected:  07/08/18 1302    Specimen:  Sputum from Cough Updated:  07/09/18 0613     Gram Stain Result Rare (1+) WBCs seen      Rare (1+) Epithelial cells per low power field      Mixed bacterial morphotypes seen on Gram Stain    POC Glucose Once [[id number]]  (Abnormal) Collected:  07/08/18 2203    Specimen:  Blood Updated:  07/08/18 2212     Glucose 210 (H) mg/dL     Narrative:       Meter: [id number] : [id number] Mónica Cintron RN    Calcium, Ionized [[id number]]  (Normal) Collected:  07/08/18 1614    Specimen:  Blood Updated:  07/08/18 1706     Ionized Calcium 1.29 mmol/L      Ionized Calcium 5.2 mg/dL     Basic Metabolic Panel [[id number]]  (Abnormal) Collected:  07/08/18 1614    Specimen:  Blood Updated:  07/08/18 1705     Glucose 171 (H) mg/dL      BUN 5 (L) mg/dL      Creatinine 0.45 (L) mg/dL      Sodium 134 (L) mmol/L      Potassium 3.9 mmol/L      Chloride 101 mmol/L      CO2 19.0 (L) mmol/L      Calcium 9.1 mg/dL      eGFR Non African Amer >150 mL/min/1.73      BUN/Creatinine Ratio 11.1     Anion Gap 14.0 mmol/L     Narrative:       GFR Normal >60  Chronic Kidney Disease <60  Kidney Failure <15    Magnesium [[id number]]  (Normal) Collected:  07/08/18 1614    Specimen:  Blood Updated:  07/08/18 1705     Magnesium 2.0 mg/dL     POC Glucose Once [[id number]]  (Abnormal) Collected:  07/08/18 1612    Specimen:  Blood Updated:  07/08/18 1623     Glucose 164 (H) mg/dL     Narrative:       Meter: [id number] : [id number] Gladys Genao RN    Blood Gas, Venous [[id number]]  (Abnormal) Collected:  07/08/18 1611    Specimen:  Venous Blood Updated:  07/08/18 1614     Site N/A      pH, Venous 7.409 pH Units      pCO2, Venous 33.7 (L) mm Hg      pO2, Venous 25.8 (L) mm Hg      HCO3, Venous 21.3 (L) mmol/L      Base Excess, Venous -2.6 mmol/L      O2 Saturation Calculated 48.7 (L) %      Barometric Pressure for Blood Gas 753.1 mmHg      Modality Room Air     Rate 16 Breaths/minute     Narrative:       DRAWN BY RN Meter: [id number] : [id number] Madhavi Guillory    POC Glucose Once [[id number]]  (Abnormal) Collected:  07/08/18 1358    Specimen:  Blood Updated:  07/08/18 1409     Glucose 177 (H) mg/dL     Narrative:       Meter: [id number] : [id number] Gladys Genao RN    Basic Metabolic Panel [[id number]]  (Abnormal) Collected:  07/08/18 1218    Specimen:  Blood Updated:  07/08/18 1408     Glucose 183 (H) mg/dL      BUN 6 mg/dL      Creatinine 0.46 (L) mg/dL      Sodium 132 (L) mmol/L      Potassium 3.5 mmol/L      Chloride 101 mmol/L      CO2 16.7 (L) mmol/L      Calcium 9.2 mg/dL      eGFR Non African Amer >150 mL/min/1.73      BUN/Creatinine Ratio 13.0     Anion Gap 14.3 mmol/L     Narrative:       GFR Normal >60  Chronic Kidney Disease <60  Kidney Failure <15    Magnesium [[id number]]  (Normal) Collected:  07/08/18 1218    Specimen:  Blood Updated:  07/08/18 1408     Magnesium 2.0 mg/dL     Calcium, Ionized [[id number]]  (Normal) Collected:  07/08/18 1218    Specimen:  Blood Updated:  07/08/18 1339     Ionized Calcium 1.29 mmol/L      Ionized Calcium 5.2 mg/dL     POC Glucose Once [[id number]]  (Abnormal) Collected:  07/08/18 1215    Specimen:  Blood Updated:  07/08/18 1227     Glucose 179 (H) mg/dL     Narrative:       Meter: [id number] : [id number] Gladys Genao RN    POC Glucose Once [[id number]]  (Abnormal) Collected:  07/08/18 1106    Specimen:  Blood Updated:  07/08/18 1108     Glucose 239 (H) mg/dL     Narrative:       Meter: [id number] : [id number] Hiren CAMILO    POC Glucose Once [[id number]]  (Abnormal) Collected:  07/08/18 1007    Specimen:  Blood Updated:   07/08/18 1031     Glucose 172 (H) mg/dL     Narrative:       Meter: [id number] : [id number] Gladys Genao RN    Phosphorus [[id number]]  (Abnormal) Collected:  07/08/18 0824    Specimen:  Blood Updated:  07/08/18 0947     Phosphorus 0.9 (L) mg/dL     Basic Metabolic Panel [[id number]]  (Abnormal) Collected:  07/08/18 0824    Specimen:  Blood Updated:  07/08/18 0947     Glucose 197 (H) mg/dL      BUN 7 mg/dL      Creatinine 0.50 (L) mg/dL      Sodium 134 (L) mmol/L      Potassium 3.7 mmol/L      Chloride 103 mmol/L      CO2 16.4 (L) mmol/L      Calcium 8.6 mg/dL      eGFR Non African Amer >150 mL/min/1.73      BUN/Creatinine Ratio 14.0     Anion Gap 14.6 mmol/L     Narrative:       GFR Normal >60  Chronic Kidney Disease <60  Kidney Failure <15    Calcium, Ionized [[id number]]  (Normal) Collected:  07/08/18 0824    Specimen:  Blood Updated:  07/08/18 0925     Ionized Calcium 1.31 mmol/L      Ionized Calcium 5.2 mg/dL     Magnesium [[id number]]  (Normal) Collected:  07/08/18 0825    Specimen:  Blood Updated:  07/08/18 0922     Magnesium 2.2 mg/dL            Assessment/Plan     Active Problems:    * No active hospital problems. *       Assessment & Plan     This gentleman has pneumonia with a complex parapneumonic effusion and trapped lung.  Apparently he stopped taking his insulin 5 years ago and has done nothing to manage his diabetes.  He was admitted to the hospital in DKA.  That has been corrected.  Because this effusion is multiloculated I do not believe that simple chest tube placement will control the situation.  I have discussed with the patient and his fiancée left VAT with decortication of the left lung.  I have explained the procedure in detail.  We have discussed the risks and benefits.  I have answered all of his questions to his satisfaction.  He has requested that we proceed.  Surgery has been scheduled for 11 AM this morning.    Thomas Oliver III, MD  [facility]  07/09/18  8:11  AM

## 2018-07-09 NOTE — PLAN OF CARE
Problem: Patient Care Overview  Goal: Plan of Care Review  Outcome: Ongoing (interventions implemented as appropriate)   07/09/18 0318   OTHER   Outcome Summary T max 100.6 at start of shift persistent pain complaints resulting IV  diluadid being given twice and vicoden being given once  dose of torodol given 30 mg IVP pt quiet thus far. sats 95 to 98% on RA MD asked to see because of co of Knot In left side MD evaluated No new orders NPO since MN ? chest tube to be evaluated by Dr. Oliver today. DKA resolved. ? May be overflow if MD agrees.        Problem: Skin Injury Risk (Adult)  Goal: Skin Health and Integrity  Outcome: Ongoing (interventions implemented as appropriate)      Problem: Sepsis/Septic Shock (Adult)  Goal: Signs and Symptoms of Listed Potential Problems Will be Absent, Minimized or Managed (Sepsis/Septic Shock)  Outcome: Ongoing (interventions implemented as appropriate)      Problem: Diabetes, Type 2 (Adult)  Goal: Signs and Symptoms of Listed Potential Problems Will be Absent, Minimized or Managed (Diabetes, Type 2)  Outcome: Ongoing (interventions implemented as appropriate)      Problem: Fall Risk (Adult)  Goal: Identify Related Risk Factors and Signs and Symptoms  Outcome: Ongoing (interventions implemented as appropriate)    Goal: Absence of Fall  Outcome: Ongoing (interventions implemented as appropriate)      Problem: Pain, Acute (Adult)  Goal: Identify Related Risk Factors and Signs and Symptoms  Outcome: Ongoing (interventions implemented as appropriate)    Goal: Acceptable Pain Control/Comfort Level  Outcome: Ongoing (interventions implemented as appropriate)

## 2018-07-09 NOTE — NURSING NOTE
Noted to be sitting on the side of bed with girlfriend assisting. Explained that if pt wanted to get to chair or oob I would be glad to help and pt reports she can do it, Explained that I am responsible should he fall pt responds bring a piece of paper and I will sign it making you not responsible if I fall. I just want to sit or move around , Charge nurse made aware.

## 2018-07-09 NOTE — PROGRESS NOTES
[facility] INPATIENT PROGRESS NOTE         [facility] OR    2018      PATIENT IDENTIFICATION:  Name: Myles Toledo ADMIT: 2018   : 1965  PCP: Miguel Ray DO    MRN: [id number] LOS: 2 days   AGE/SEX: 52 y.o. male  ROOM: [facility] OR/MAIN OR                     LOS 2    Reason for visit: Follow-up DKA with sepsis, pneumonia and loculated effusion      SUBJECTIVE:    Complains of shortness of breath and pleuritic chest discomfort on the left.  Moderate cough.    Objective   OBJECTIVE:    Vital Sign Min/Max for last 24 hours  Temp  Min: 98.3 °F (36.8 °C)  Max: 100.6 °F (38.1 °C)   BP  Min: 112/72  Max: 165/75   Pulse  Min: 89  Max: 109   Resp  Min: 20  Max: 24   SpO2  Min: 93 %  Max: 96 %   No Data Recorded   No Data Recorded                         Body mass index is 35.73 kg/m².    Intake/Output Summary (Last 24 hours) at 18 1045  Last data filed at 18 0500   Gross per 24 hour   Intake           2408.7 ml   Output             1650 ml   Net            758.7 ml         Exam:  GEN:  Mild respiratory distress, appears stated age  EYES:   PERRL, anicteric sclera  ENT:    External ears/nose normal, OP clear  NECK:  No adenopathy, midline trachea  LUNGS: Normal chest on inspection, palpation and Diminished left greater than right with scattered coarse breath sounds on auscultation  CV:  Normal S1S2, without murmur  ABD:  Non tender, non distended, no hepatosplenomegaly, +BS  EXT:  No edema, cyanosis or clubbing    Scheduled meds:      [MAR Hold] azithromycin 500 mg Oral Q24H   [MAR Hold] ceftriaxone 1 g Intravenous Q24H   [MAR Hold] enoxaparin 40 mg Subcutaneous Q24H   [MAR Hold] insulin aspart 0-9 Units Subcutaneous 4x Daily With Meals & Nightly   [MAR Hold] insulin detemir 15 Units Subcutaneous Q12H   [MAR Hold] ketorolac 30 mg Intravenous Once   [MAR Hold] lidocaine 1 patch Transdermal Q24H   [MAR Hold] sodium chloride 75 mL/hr Intravenous Once     IV meds:                           lactated ringers 9 mL/hr    sodium chloride 9 mL/hr Last Rate: 9 mL/hr (07/08/18 0532)     Data Review:    Results from last 7 days  Lab Units 07/09/18  0839 07/08/18  1614 07/08/18  1218 07/08/18  0824 07/08/18  0417   SODIUM mmol/L 130* 134* 132* 134* 132*   POTASSIUM mmol/L 3.5 3.9 3.5 3.7 4.2   CHLORIDE mmol/L 94* 101 101 103 101   CO2 mmol/L 17.7* 19.0* 16.7* 16.4* 13.0*   BUN mg/dL 9 5* 6 7 8   CREATININE mg/dL 0.47* 0.45* 0.46* 0.50* 0.60*   GLUCOSE mg/dL 211* 171* 183* 197* 221*   CALCIUM mg/dL 8.7 9.1 9.2 8.6 8.5*         Estimated Creatinine Clearance: 215.4 mL/min (A) (by C-G formula based on SCr of 0.47 mg/dL (L)).    Results from last 7 days  Lab Units 07/09/18  0605 07/07/18 2003 07/07/18  1249   WBC 10*3/mm3 12.41* 15.47* 21.11*   HEMOGLOBIN g/dL 12.3* 13.7 15.4   PLATELETS 10*3/mm3 353 413 474       Results from last 7 days  Lab Units 07/07/18  2005   INR  1.24*       Results from last 7 days  Lab Units 07/07/18  1249   ALT (SGPT) U/L 10   AST (SGOT) U/L 13       Results from last 7 days  Lab Units 07/07/18  1350   PH, ARTERIAL pH units 7.105*   PO2 ART mm Hg 98.8   PCO2, ARTERIAL mm Hg 14.6*   HCO3 ART mmol/L 4.6*       Results from last 7 days  Lab Units 07/07/18  1313 07/07/18  1249   PROCALCITONIN ng/mL  --  0.13   LACTATE mmol/L 1.2  --          Chest x-ray and CT chest viewed    Microbiology reviewed        )Assessment   ASSESSMENT:      Acute DKA  Loculated left pleural effusion  Left pleuritic chest pain secondary to PNA and effusion  Left lower lobe pneumonia  Sepsis  Hyponatremia  Hypertension      PLAN:  DKA improved.  Discussed with Dr. Mendes yesterday and Dr. Oliver today.  Plan for surgery today.  Antibiotics for pneumonia.  Pain control.    Discussed with multidisciplinary ICU team on rounds this morning.      Miguel Sequeira MD  Pulmonary and Critical Care Medicine  [facility], [facility]  7/9/2018    10:45 AM

## 2018-07-09 NOTE — ANESTHESIA PROCEDURE NOTES
Arterial Line    Patient location during procedure: holding area  Start time: 7/9/2018 10:30 AM  Stop Time:7/9/2018 10:40 AM       Line placed for hemodynamic monitoring.  Performed By   Anesthesiologist: KAPIL FARRIS  Preanesthetic Checklist  Completed: patient identified, site marked, surgical consent, pre-op evaluation, timeout performed, IV checked, risks and benefits discussed and monitors and equipment checked  Arterial Line Prep   Sterile Tech: mask and cap  Prep: ChloraPrep  Patient monitoring: blood pressure monitoring, continuous pulse oximetry and EKG  Arterial Line Procedure   Laterality:right  Location:  radial artery  Catheter size: 20 G   Guidance: landmark technique  Number of attempts: 1  Successful placement: yes          Post Assessment   Dressing Type: occlusive dressing applied, secured with tape and wrist guard applied.   Complications no  Circ/Move/Sens Assessment: unchanged.   Patient Tolerance: patient tolerated the procedure well with no apparent complications

## 2018-07-09 NOTE — ANESTHESIA PROCEDURE NOTES
Airway  Urgency: elective    Airway not difficult    General Information and Staff    Patient location during procedure: OR  Anesthesiologist: KAPIL FARRIS  CRNA: MERCY GÓMEZ    Indications and Patient Condition  Indications for airway management: airway protection    Preoxygenated: yes  Mask difficulty assessment: 1 - vent by mask    Final Airway Details  Final airway type: endotracheal airway      Successful airway: ETT - double lumen left  Cuffed: yes   Successful intubation technique: direct laryngoscopy  Facilitating devices/methods: intubating stylet  Endotracheal tube insertion site: oral  Blade: Avelina  Blade size: #4  ETT DL size: 39 fr  Cormack-Lehane Classification: grade IIb - view of arytenoids or posterior of glottis only  Placement verified by: chest auscultation, bronchoscopy, capnometry and single lung ventilation   Tracheal cuff pressure (cm H2O): 7  Bronchial cuff pressure (cm H2O): 3  Measured from: lips  ETT to lips (cm): 30  Number of attempts at approach: 1    Additional Comments  SIVI.  EYES TAPED CLOSED PRIOR TO DL.  INTUBATION AS CHARTED ABOVE.  APPEARS ATRAUMATIC.  NO CHANGE TO DENTITION. +ETCO2. +BBS. +CR.

## 2018-07-09 NOTE — ANESTHESIA PREPROCEDURE EVALUATION
Anesthesia Evaluation     Patient summary reviewed and Nursing notes reviewed   NPO Solid Status: > 8 hours  NPO Liquid Status: > 8 hours           Airway   Mallampati: II  Neck ROM: full  No difficulty expected  Dental - normal exam     Pulmonary     breath sounds clear to auscultation  Cardiovascular     Rhythm: regular    (+) hypertension,       Neuro/Psych  GI/Hepatic/Renal/Endo    (+)   diabetes mellitus,     Musculoskeletal     Abdominal   (+) obese,    Substance History      OB/GYN          Other                        Anesthesia Plan    ASA 3     general     intravenous induction   Anesthetic plan and risks discussed with patient.

## 2018-07-10 ENCOUNTER — APPOINTMENT (OUTPATIENT)
Dept: GENERAL RADIOLOGY | Facility: HOSPITAL | Age: 53
End: 2018-07-10

## 2018-07-10 LAB
ANION GAP SERPL CALCULATED.3IONS-SCNC: 15.7 MMOL/L
BACTERIA SPEC RESP CULT: NORMAL
BUN BLD-MCNC: 11 MG/DL (ref 6–20)
BUN/CREAT SERPL: 21.2 (ref 7–25)
CALCIUM SPEC-SCNC: 8.1 MG/DL (ref 8.6–10.5)
CHLORIDE SERPL-SCNC: 94 MMOL/L (ref 98–107)
CO2 SERPL-SCNC: 19.3 MMOL/L (ref 22–29)
CREAT BLD-MCNC: 0.52 MG/DL (ref 0.76–1.27)
CYTO UR: NORMAL
DEPRECATED RDW RBC AUTO: 39.4 FL (ref 37–54)
ERYTHROCYTE [DISTWIDTH] IN BLOOD BY AUTOMATED COUNT: 13.4 % (ref 11.5–14.5)
GFR SERPL CREATININE-BSD FRML MDRD: >150 ML/MIN/1.73
GIE STN SPEC: NORMAL
GLUCOSE BLD-MCNC: 276 MG/DL (ref 65–99)
GLUCOSE BLDC GLUCOMTR-MCNC: 119 MG/DL (ref 70–130)
GLUCOSE BLDC GLUCOMTR-MCNC: 146 MG/DL (ref 70–130)
GLUCOSE BLDC GLUCOMTR-MCNC: 195 MG/DL (ref 70–130)
GLUCOSE BLDC GLUCOMTR-MCNC: 243 MG/DL (ref 70–130)
GLUCOSE BLDC GLUCOMTR-MCNC: 261 MG/DL (ref 70–130)
GLUCOSE BLDC GLUCOMTR-MCNC: 264 MG/DL (ref 70–130)
GLUCOSE BLDC GLUCOMTR-MCNC: 305 MG/DL (ref 70–130)
GRAM STN SPEC: NORMAL
HCT VFR BLD AUTO: 33.4 % (ref 40.4–52.2)
HGB BLD-MCNC: 11.6 G/DL (ref 13.7–17.6)
LAB AP CASE REPORT: NORMAL
MCH RBC QN AUTO: 28 PG (ref 27–32.7)
MCHC RBC AUTO-ENTMCNC: 34.7 G/DL (ref 32.6–36.4)
MCV RBC AUTO: 80.7 FL (ref 79.8–96.2)
PATH REPORT.FINAL DX SPEC: NORMAL
PATH REPORT.GROSS SPEC: NORMAL
PLATELET # BLD AUTO: 374 10*3/MM3 (ref 140–500)
PMV BLD AUTO: 8.7 FL (ref 6–12)
POTASSIUM BLD-SCNC: 3.6 MMOL/L (ref 3.5–5.2)
POTASSIUM BLD-SCNC: 4.6 MMOL/L (ref 3.5–5.2)
RBC # BLD AUTO: 4.14 10*6/MM3 (ref 4.6–6)
SODIUM BLD-SCNC: 129 MMOL/L (ref 136–145)
WBC NRBC COR # BLD: 15.92 10*3/MM3 (ref 4.5–10.7)

## 2018-07-10 PROCEDURE — 25010000002 ENOXAPARIN PER 10 MG: Performed by: INTERNAL MEDICINE

## 2018-07-10 PROCEDURE — 63710000001 INSULIN DETEMIR PER 5 UNITS: Performed by: INTERNAL MEDICINE

## 2018-07-10 PROCEDURE — 94799 UNLISTED PULMONARY SVC/PX: CPT

## 2018-07-10 PROCEDURE — 99024 POSTOP FOLLOW-UP VISIT: CPT | Performed by: NURSE PRACTITIONER

## 2018-07-10 PROCEDURE — 71045 X-RAY EXAM CHEST 1 VIEW: CPT

## 2018-07-10 PROCEDURE — 84132 ASSAY OF SERUM POTASSIUM: CPT | Performed by: INTERNAL MEDICINE

## 2018-07-10 PROCEDURE — 63710000001 INSULIN ASPART PER 5 UNITS: Performed by: INTERNAL MEDICINE

## 2018-07-10 PROCEDURE — 25010000002 CEFTRIAXONE PER 250 MG: Performed by: INTERNAL MEDICINE

## 2018-07-10 PROCEDURE — 85027 COMPLETE CBC AUTOMATED: CPT | Performed by: INTERNAL MEDICINE

## 2018-07-10 PROCEDURE — 82962 GLUCOSE BLOOD TEST: CPT

## 2018-07-10 PROCEDURE — 25010000002 KETOROLAC TROMETHAMINE PER 15 MG: Performed by: THORACIC SURGERY (CARDIOTHORACIC VASCULAR SURGERY)

## 2018-07-10 PROCEDURE — 25010000002 HYDROMORPHONE PER 4 MG: Performed by: INTERNAL MEDICINE

## 2018-07-10 PROCEDURE — 25010000002 MORPHINE PER 10 MG: Performed by: INTERNAL MEDICINE

## 2018-07-10 PROCEDURE — 80048 BASIC METABOLIC PNL TOTAL CA: CPT | Performed by: INTERNAL MEDICINE

## 2018-07-10 RX ADMIN — POTASSIUM CHLORIDE 40 MEQ: 750 CAPSULE, EXTENDED RELEASE ORAL at 08:17

## 2018-07-10 RX ADMIN — HYDROCODONE BITARTRATE AND ACETAMINOPHEN 2 TABLET: 7.5; 325 TABLET ORAL at 20:14

## 2018-07-10 RX ADMIN — HYDROMORPHONE HYDROCHLORIDE 0.5 MG: 10 INJECTION, SOLUTION INTRAMUSCULAR; INTRAVENOUS; SUBCUTANEOUS at 08:24

## 2018-07-10 RX ADMIN — HYDROMORPHONE HYDROCHLORIDE 0.5 MG: 10 INJECTION, SOLUTION INTRAMUSCULAR; INTRAVENOUS; SUBCUTANEOUS at 10:09

## 2018-07-10 RX ADMIN — HYDROCODONE BITARTRATE AND ACETAMINOPHEN 2 TABLET: 7.5; 325 TABLET ORAL at 16:01

## 2018-07-10 RX ADMIN — HYDROCODONE BITARTRATE AND ACETAMINOPHEN 2 TABLET: 7.5; 325 TABLET ORAL at 12:33

## 2018-07-10 RX ADMIN — MORPHINE SULFATE 2 MG: 2 INJECTION, SOLUTION INTRAMUSCULAR; INTRAVENOUS at 11:55

## 2018-07-10 RX ADMIN — METOPROLOL TARTRATE 25 MG: 25 TABLET ORAL at 08:17

## 2018-07-10 RX ADMIN — KETOROLAC TROMETHAMINE 30 MG: 30 INJECTION, SOLUTION INTRAMUSCULAR; INTRAVENOUS at 11:55

## 2018-07-10 RX ADMIN — AZITHROMYCIN 500 MG: 250 TABLET, FILM COATED ORAL at 08:17

## 2018-07-10 RX ADMIN — INSULIN DETEMIR 20 UNITS: 100 INJECTION, SOLUTION SUBCUTANEOUS at 20:14

## 2018-07-10 RX ADMIN — CEFTRIAXONE SODIUM 1 G: 1 INJECTION, SOLUTION INTRAVENOUS at 00:53

## 2018-07-10 RX ADMIN — INSULIN ASPART 6 UNITS: 100 INJECTION, SOLUTION INTRAVENOUS; SUBCUTANEOUS at 08:20

## 2018-07-10 RX ADMIN — KETOROLAC TROMETHAMINE 30 MG: 30 INJECTION, SOLUTION INTRAMUSCULAR; INTRAVENOUS at 00:02

## 2018-07-10 RX ADMIN — HYDROMORPHONE HYDROCHLORIDE 0.5 MG: 10 INJECTION, SOLUTION INTRAMUSCULAR; INTRAVENOUS; SUBCUTANEOUS at 00:11

## 2018-07-10 RX ADMIN — KETOROLAC TROMETHAMINE 30 MG: 30 INJECTION, SOLUTION INTRAMUSCULAR; INTRAVENOUS at 17:35

## 2018-07-10 RX ADMIN — INSULIN ASPART 2 UNITS: 100 INJECTION, SOLUTION INTRAVENOUS; SUBCUTANEOUS at 12:00

## 2018-07-10 RX ADMIN — HYDROCODONE BITARTRATE AND ACETAMINOPHEN 1 TABLET: 7.5; 325 TABLET ORAL at 01:30

## 2018-07-10 RX ADMIN — INSULIN ASPART 4 UNITS: 100 INJECTION, SOLUTION INTRAVENOUS; SUBCUTANEOUS at 18:00

## 2018-07-10 RX ADMIN — SODIUM CHLORIDE 75 ML/HR: 9 INJECTION, SOLUTION INTRAVENOUS at 00:53

## 2018-07-10 RX ADMIN — DOCUSATE SODIUM 100 MG: 100 CAPSULE, LIQUID FILLED ORAL at 08:17

## 2018-07-10 RX ADMIN — POTASSIUM CHLORIDE 40 MEQ: 750 CAPSULE, EXTENDED RELEASE ORAL at 16:01

## 2018-07-10 RX ADMIN — METOPROLOL TARTRATE 25 MG: 25 TABLET ORAL at 20:14

## 2018-07-10 RX ADMIN — KETOROLAC TROMETHAMINE 30 MG: 30 INJECTION, SOLUTION INTRAMUSCULAR; INTRAVENOUS at 06:01

## 2018-07-10 RX ADMIN — HYDROCODONE BITARTRATE AND ACETAMINOPHEN 1 TABLET: 7.5; 325 TABLET ORAL at 06:01

## 2018-07-10 RX ADMIN — ENOXAPARIN SODIUM 40 MG: 40 INJECTION SUBCUTANEOUS at 00:01

## 2018-07-10 RX ADMIN — INSULIN DETEMIR 15 UNITS: 100 INJECTION, SOLUTION SUBCUTANEOUS at 08:18

## 2018-07-10 RX ADMIN — INSULIN ASPART 7 UNITS: 100 INJECTION, SOLUTION INTRAVENOUS; SUBCUTANEOUS at 20:41

## 2018-07-10 NOTE — PROGRESS NOTES
[facility] INPATIENT PROGRESS NOTE         [address]    7/10/2018      PATIENT IDENTIFICATION:  Name: Myles Toledo ADMIT: 2018   : 1965  PCP: Miguel Ray DO    MRN: [id number] LOS: 3 days   AGE/SEX: 52 y.o. male  ROOM: [facility]                     LOS 3    Reason for visit: Follow-up DKA with sepsis, pneumonia and loculated effusion      SUBJECTIVE:    Resting comfortably. Pain well-controlled. Chest tubes stable. Diminished breath sounds and left bass greater than right with scattered course rhonchi. No wheezing.    Objective   OBJECTIVE:    Vital Sign Min/Max for last 24 hours  Temp  Min: 97.6 °F (36.4 °C)  Max: 98.9 °F (37.2 °C)   BP  Min: 102/61  Max: 165/112   Pulse  Min: 90  Max: 119   Resp  Min: 16  Max: 22   SpO2  Min: 93 %  Max: 99 %   No Data Recorded   No Data Recorded                         Body mass index is 35.73 kg/m².    Intake/Output Summary (Last 24 hours) at 07/10/18 1227  Last data filed at 07/10/18 0927   Gross per 24 hour   Intake             2455 ml   Output             2771 ml   Net             -316 ml         Exam:  GEN:  Mild respiratory distress, appears stated age  EYES:   PERRL, anicteric sclera  ENT:    External ears/nose normal, OP clear  NECK:  No adenopathy, midline trachea  LUNGS: Normal chest on inspection, palpation and Diminished left greater than right with scattered coarse breath sounds on auscultation  CV:  Normal S1S2, without murmur  ABD:  Non tender, non distended, no hepatosplenomegaly, +BS  EXT:  No edema, cyanosis or clubbing    Scheduled meds:      azithromycin 500 mg Oral Q24H   ceftriaxone 1 g Intravenous Q24H   docusate sodium 100 mg Oral BID   enoxaparin 40 mg Subcutaneous Q24H   insulin aspart 0-9 Units Subcutaneous 4x Daily With Meals & Nightly   insulin detemir 15 Units Subcutaneous Q12H   ipratropium-albuterol 3 mL Nebulization Q8H - RT   ketorolac 30 mg Intravenous Q6H   lidocaine 1 patch Transdermal Q24H   metoprolol  tartrate 25 mg Oral Q12H   Or      metoprolol tartrate 5 mg Intravenous Q12H   polyethylene glycol 17 g Oral Daily   sennosides-docusate sodium 2 tablet Oral Nightly     IV meds:                          lactated ringers 9 mL/hr    sodium chloride 9 mL/hr Last Rate: 9 mL/hr (07/08/18 0532)   sodium chloride 75 mL/hr Last Rate: Stopped (07/10/18 0641)     Data Review:    Results from last 7 days  Lab Units 07/10/18  0154 07/09/18  0839 07/08/18  1614 07/08/18  1218 07/08/18  0824   SODIUM mmol/L 129* 130* 134* 132* 134*   POTASSIUM mmol/L 3.6 3.5 3.9 3.5 3.7   CHLORIDE mmol/L 94* 94* 101 101 103   CO2 mmol/L 19.3* 17.7* 19.0* 16.7* 16.4*   BUN mg/dL 11 9 5* 6 7   CREATININE mg/dL 0.52* 0.47* 0.45* 0.46* 0.50*   GLUCOSE mg/dL 276* 211* 171* 183* 197*   CALCIUM mg/dL 8.1* 8.7 9.1 9.2 8.6         Estimated Creatinine Clearance: 194.7 mL/min (A) (by C-G formula based on SCr of 0.52 mg/dL (L)).    Results from last 7 days  Lab Units 07/10/18  0154 07/09/18  0605 07/07/18  2003 07/07/18  1249   WBC 10*3/mm3 15.92* 12.41* 15.47* 21.11*   HEMOGLOBIN g/dL 11.6* 12.3* 13.7 15.4   PLATELETS 10*3/mm3 374 353 413 474       Results from last 7 days  Lab Units 07/07/18  2005   INR  1.24*       Results from last 7 days  Lab Units 07/07/18  1249   ALT (SGPT) U/L 10   AST (SGOT) U/L 13       Results from last 7 days  Lab Units 07/07/18  1350   PH, ARTERIAL pH units 7.105*   PO2 ART mm Hg 98.8   PCO2, ARTERIAL mm Hg 14.6*   HCO3 ART mmol/L 4.6*       Results from last 7 days  Lab Units 07/07/18  1313 07/07/18  1249   PROCALCITONIN ng/mL  --  0.13   LACTATE mmol/L 1.2  --      Hemoglobin A1C 14.30 (H)           Chest x-ray viewed    Microbiology reviewed        )Assessment   ASSESSMENT:      Acute DKA  Poorly controlled DM with Hgba1c >14  Loculated left pleural effusion/Empyema  Left pleuritic chest pain secondary to PNA and effusion  Left lower lobe pneumonia  Sepsis  Hyponatremia  Hypertension      PLAN:  DKA improved.  Control glc  with insulin.  S/P VATs for empyema  Antibiotics for pneumonia.  Pain control.  Chest tubes per Dr Oliver.        Miguel Sequeira MD  Pulmonary and Critical Care Medicine  [facility], [facility]  7/10/2018    12:27 PM

## 2018-07-10 NOTE — PLAN OF CARE
Problem: Patient Care Overview  Goal: Plan of Care Review  Outcome: Ongoing (interventions implemented as appropriate)   07/10/18 7424   Coping/Psychosocial   Plan of Care Reviewed With patient   OTHER   Outcome Summary VSS. pain controlled with prn pain medication, 3 chest tubes remain in left side -40 suction, no S/S of discomfort or distress. wll continue to monitor    Plan of Care Review   Progress improving     Goal: Discharge Needs Assessment  Outcome: Ongoing (interventions implemented as appropriate)      Problem: Skin Injury Risk (Adult)  Goal: Identify Related Risk Factors and Signs and Symptoms  Outcome: Ongoing (interventions implemented as appropriate)    Goal: Skin Health and Integrity  Outcome: Ongoing (interventions implemented as appropriate)      Problem: Sepsis/Septic Shock (Adult)  Goal: Signs and Symptoms of Listed Potential Problems Will be Absent, Minimized or Managed (Sepsis/Septic Shock)  Outcome: Ongoing (interventions implemented as appropriate)      Problem: Diabetes, Type 2 (Adult)  Goal: Signs and Symptoms of Listed Potential Problems Will be Absent, Minimized or Managed (Diabetes, Type 2)  Outcome: Ongoing (interventions implemented as appropriate)      Problem: Fall Risk (Adult)  Goal: Identify Related Risk Factors and Signs and Symptoms  Outcome: Ongoing (interventions implemented as appropriate)    Goal: Absence of Fall  Outcome: Ongoing (interventions implemented as appropriate)      Problem: Pain, Acute (Adult)  Goal: Identify Related Risk Factors and Signs and Symptoms  Outcome: Ongoing (interventions implemented as appropriate)    Goal: Acceptable Pain Control/Comfort Level  Outcome: Ongoing (interventions implemented as appropriate)

## 2018-07-10 NOTE — SIGNIFICANT NOTE
"   07/10/18 1103   Rehab Time/Intention   Evaluation Not Performed patient/family declined evaluation  (Pt sitting up in chair, adamantly refused further activity. States, \"I'm not doing anymore today, this wore me out.\". Notified RN, Estephania; will follow up tomorrow for PT eval. )   Rehab Treatment   Discipline physical therapist   Recommendation   PT - Next Appointment 07/11/18     "

## 2018-07-10 NOTE — PROGRESS NOTES
Discharge Planning Assessment  [facility]     Patient Name: Myles Toledo  MRN: [id number]  Today's Date: 7/10/2018    Admit Date: 7/7/2018          Discharge Needs Assessment     Row Name 07/10/18 1255       Living Environment    Lives With significant other    Current Living Arrangements home/apartment/condo    Provides Primary Care For no one       Transition Planning    Patient/Family Anticipates Transition to home with family    Transportation Anticipated family or friend will provide       Discharge Needs Assessment    Equipment Currently Used at Home none            Discharge Plan     Row Name 07/10/18 1722       Plan    Plan Home with significant other    Patient/Family in Agreement with Plan yes    Plan Comments Met with pt at bedside.  Introduced self, explained CCP role, facesheet verified.  Pt states he lives with significant other.  Independent with ADL's prior to hospitalization.  Pt without health insurance and has not been taking medication for 3-4 years.  States he did have some old lisinopril that he's been taking.  Pt states he has no income, supported by [employer].  Information regarding application for disabiltiy provided.  Med Assist to see pt. CCP will follow, will need to follow for medication costs.  DEWEY Dong RN        Destination     No service coordination in this encounter.      Durable Medical Equipment     No service coordination in this encounter.      Dialysis/Infusion     No service coordination in this encounter.      Home Medical Care     No service coordination in this encounter.      Social Care     No service coordination in this encounter.                Demographic Summary     Row Name 07/10/18 1253       General Information    Admission Type inpatient    Arrived From home    Referral Source admission list    Reason for Consult discharge planning    Preferred Language English       Contact Information    Permission Granted to Share Info With family/designee   Sugey Riddle  (significant other) [phone]            Functional Status     Row Name 07/10/18 1253       Functional Status, IADL    Medications independent    Meal Preparation independent    Housekeeping independent    Laundry independent    Shopping independent            Psychosocial    No documentation.           Abuse/Neglect    No documentation.           Legal    No documentation.           Substance Abuse    No documentation.           Patient Forms    No documentation.         Naila Dong

## 2018-07-10 NOTE — NURSING NOTE
"Diabetes Education  Assessment/Teaching    Patient Name:  Myles Toledo  YOB: 1965  MRN: [id number]  Admit Date:  7/7/2018      Assessment Date:  7/10/2018    Most Recent Value   General Information    Referral From:  MD order -perform initial assessment of educational needs w/pt at bedside.    Height  172.7 cm (68\")   Height Method  Stated   Weight  107 kg (235 lb 0.2 oz)   Diabetes History   What type of diabetes do you have?  Type 2   Do you test your blood sugar at home?  no   Have you had high blood sugar? (>140mg/dl)  yes [based upon elevated a1C > 14%]   Education Preferences   Barriers to Learning  financial stress/problems [pt states is living \"paycheck to paycheck\"]   Assessment Topics   Taking Medication - Assessment  Needs education -pt reports using insulin via syringe in past; has not taken insulin for \"years.\"   Healthy Coping - Assessment  Competent [supportive significant other at bedside. ]         Most Recent Value   DM Education Needs   Meter Type  -- advise pt to purchase a (generic) or non-brand BG meter to save money on test strip refills. Give information about Relion brand available at [facility].    Medication  Insulin [advise pt of less expensive (intermediate) NPH insulin.]   Healthy Coping  Appropriate   Teaching Method  Explanation, Discussion, Handouts   Patient Response  Needs reinforcement            Other Comments:  Wrote 'sticky note' message to MD to suggest switching Levemir insulin here to NPH for cost reasons at [place]. Thank you.         Electronically signed by:  Naila Marcus, RN, BSN, CDE   07/10/18 4:49 PM  "

## 2018-07-10 NOTE — PLAN OF CARE
Problem: Patient Care Overview  Goal: Plan of Care Review   07/10/18 1609   Coping/Psychosocial   Plan of Care Reviewed With patient;significant other   OTHER   Outcome Summary perform DM ed needs-assessment w/pt this afternoon at bedside. pls see DM ed flowsheet or note for more detail.

## 2018-07-10 NOTE — PLAN OF CARE
Problem: Patient Care Overview  Goal: Plan of Care Review  Outcome: Ongoing (interventions implemented as appropriate)   07/09/18 2039 07/10/18 0236   Coping/Psychosocial   Plan of Care Reviewed With patient;significant other --    OTHER   Outcome Summary --  VSS, pain controlled via MAR. Will d/c garvey and A-line in AM. Pt would benefit from additional DM education. Rested through night, will continue to monitor   Plan of Care Review   Progress --  improving       Problem: Skin Injury Risk (Adult)  Goal: Identify Related Risk Factors and Signs and Symptoms  Outcome: Ongoing (interventions implemented as appropriate)      Problem: Sepsis/Septic Shock (Adult)  Goal: Signs and Symptoms of Listed Potential Problems Will be Absent, Minimized or Managed (Sepsis/Septic Shock)  Outcome: Ongoing (interventions implemented as appropriate)      Problem: Diabetes, Type 2 (Adult)  Goal: Signs and Symptoms of Listed Potential Problems Will be Absent, Minimized or Managed (Diabetes, Type 2)  Outcome: Ongoing (interventions implemented as appropriate)      Problem: Fall Risk (Adult)  Goal: Identify Related Risk Factors and Signs and Symptoms  Outcome: Ongoing (interventions implemented as appropriate)    Goal: Absence of Fall  Outcome: Ongoing (interventions implemented as appropriate)      Problem: Pain, Acute (Adult)  Goal: Identify Related Risk Factors and Signs and Symptoms  Outcome: Ongoing (interventions implemented as appropriate)    Goal: Acceptable Pain Control/Comfort Level  Outcome: Ongoing (interventions implemented as appropriate)

## 2018-07-10 NOTE — PROGRESS NOTES
"    Chief Complaint: follow-up trapped lung, parapneumonic effusion  S/P: Left video assisted thoracoscopy with decortication  POD # 1    Subjective:  Symptoms:  Improved.  He reports cough, chest pain and weakness.  No shortness of breath.    Diet:  Adequate intake.  No nausea or vomiting.    Activity level: Impaired due to weakness.    Pain:  He complains of pain that is moderate.  He reports pain is improving.  Pain is well controlled and requiring pain medication.        Vital Signs:  Temp:  [97.6 °F (36.4 °C)-98.9 °F (37.2 °C)] 98.4 °F (36.9 °C)  Heart Rate:  [] 105  Resp:  [16-22] 18  BP: (102-165)/() 153/92  Arterial Line BP: (0-166)/(0-86) 141/66    Intake & Output (last day)       07/09 0701 - 07/10 0700 07/10 0701 - 07/11 0700    P.O. 600     I.V. (mL/kg) 1250 (11.7)     Other 245     IV Piggyback 50     Total Intake(mL/kg) 2145 (20)     Urine (mL/kg/hr) 1620 (0.6)     Other 700 (0.3)     Blood 200 (0.1)     Chest Tube 251 (0.1)     Total Output 2771      Net -626                  Objective:  General Appearance:  Comfortable, well-appearing, in no acute distress and not in pain.    Vital signs: (most recent): Blood pressure 126/86, pulse 102, temperature 98.4 °F (36.9 °C), temperature source Oral, resp. rate 18, height 172.7 cm (68\"), weight 107 kg (235 lb 0.2 oz), SpO2 97 %.  Vital signs are normal.  No fever.    Output: Producing urine and no stool output.    HEENT: Normal HEENT exam.    Lungs:  Normal effort and normal respiratory rate.  He is not in respiratory distress.  There are decreased breath sounds and rhonchi.  No wheezes.  (Diminished breath sounds in left lower lung field)  Heart: Normal rate.  Regular rhythm.  S1 normal and S2 normal.  No murmur.   Chest: Chest wall tenderness present.    Abdomen: Abdomen is soft and non-distended.  Bowel sounds are normal.   There is no abdominal tenderness.   There is no mass.   Extremities: Normal range of motion.  There is no dependent edema. "    Pulses: Distal pulses are intact.    Neurological: Patient is alert and oriented to person, place and time.  Normal strength.    Pupils:  Pupils are equal, round, and reactive to light.    Skin:  Warm and dry.          Chest tube:   Site: Left, Clean, Dry, Intact and Securement device intact  Suction: -40 cm  Air Leak: negative  Level: 18/110/130  24 Hour Total: 7/110/124    Results Review:     I reviewed the patient's new clinical results.  I reviewed the patient's new imaging results and agree with the interpretation.  Discussed with patient, RN and Dr. Oliver.    Imaging Results (last 24 hours)     Procedure Component Value Units Date/Time    XR Chest 1 View [[id number]] Updated:  07/10/18 0709    XR Chest 1 View [[id number]] Collected:  07/09/18 1454     Updated:  07/09/18 1454    Narrative:       PORTABLE CHEST     HISTORY: Postop management.     COMPARISON: None.     A single portable view of the chest demonstrates the heart to be within  normal limits in size. Three left-sided chest tubes are noted. The 2  superior chest tubes demonstrates side ports superimposed over the  lateral margin of the hemithorax. There is no evidence of pneumothorax.  There is mild prominence of the pulmonary vasculature suggesting mild  congestive changes. There is increased density present at the left lung  base which may represent focal infiltrate/atelectasis and layering  pleural fluid. There is no evidence of consolidation. A follow-up PA and  lateral view of the chest is suggested.     The above information was called to the patient's nurse at the time of  the dictation. The patient's nurse is to relay the information to the  clinical service.             Lab Results:     Lab Results (last 24 hours)     Procedure Component Value Units Date/Time    POC Glucose Once [[id number]]  (Abnormal) Collected:  07/10/18 0611    Specimen:  Blood Updated:  07/10/18 0619     Glucose 264 (H) mg/dL     Narrative:       Meter: [id number]  : [id number] Omar Olivo CNA    Basic Metabolic Panel [[id number]]  (Abnormal) Collected:  07/10/18 0154    Specimen:  Blood Updated:  07/10/18 0226     Glucose 276 (H) mg/dL      BUN 11 mg/dL      Creatinine 0.52 (L) mg/dL      Sodium 129 (L) mmol/L      Potassium 3.6 mmol/L      Chloride 94 (L) mmol/L      CO2 19.3 (L) mmol/L      Calcium 8.1 (L) mg/dL      eGFR Non African Amer >150 mL/min/1.73      BUN/Creatinine Ratio 21.2     Anion Gap 15.7 mmol/L     Narrative:       GFR Normal >60  Chronic Kidney Disease <60  Kidney Failure <15    CBC (No Diff) [[id number]]  (Abnormal) Collected:  07/10/18 0154    Specimen:  Blood Updated:  07/10/18 0205     WBC 15.92 (H) 10*3/mm3      RBC 4.14 (L) 10*6/mm3      Hemoglobin 11.6 (L) g/dL      Hematocrit 33.4 (L) %      MCV 80.7 fL      MCH 28.0 pg      MCHC 34.7 g/dL      RDW 13.4 %      RDW-SD 39.4 fl      MPV 8.7 fL      Platelets 374 10*3/mm3     POC Glucose Once [[id number]]  (Abnormal) Collected:  07/09/18 2117    Specimen:  Blood Updated:  07/09/18 2123     Glucose 339 (H) mg/dL     Narrative:       Meter: [id number] : [id number] Nelson Geovani CNA    POC Glucose Once [[id number]]  (Abnormal) Collected:  07/09/18 1649    Specimen:  Blood Updated:  07/09/18 1650     Glucose 248 (H) mg/dL     Narrative:       Meter: [id number] : [id number] Smailagic Berany CNA    AFB Culture - Tissue, Pleural Cavity [[id number]] Collected:  07/09/18 1204    Specimen:  Tissue from Pleural Cavity Updated:  07/09/18 1420     AFB Stain No acid fast bacilli seen on direct smear    AFB Culture - Body Fluid, Pleural Cavity [[id number]] Collected:  07/09/18 1153    Specimen:  Body Fluid from Pleural Cavity Updated:  07/09/18 1420     AFB Stain No acid fast bacilli seen on concentrated smear    Tissue Pathology Exam [[id number]] Collected:  07/09/18 1249    Specimen:  Tissue from Pleura Updated:  07/09/18 1407    Tissue / Bone Culture - Tissue, Pleural Cavity [[id number]] Collected:   07/09/18 1204    Specimen:  Tissue from Pleural Cavity Updated:  07/09/18 1351     Gram Stain Result Rare (1+) WBCs seen      No organisms seen    Fungus Smear - Body Fluid, Pleural Cavity [[id number]] Collected:  07/09/18 1153    Specimen:  Body Fluid from Pleural Cavity Updated:  07/09/18 1350     GMS Stain No yeast or hyphal elements seen    Body Fluid Culture - Body Fluid, Pleural Cavity [[id number]] Collected:  07/09/18 1153    Specimen:  Body Fluid from Pleural Cavity Updated:  07/09/18 1348     Gram Stain Result Rare (1+) WBCs seen      No organisms seen    Anaerobic Culture - Tissue, Pleural Cavity [[id number]] Collected:  07/09/18 1204    Specimen:  Tissue from Pleural Cavity Updated:  07/09/18 1217    Fungus Culture - Tissue, Pleural Cavity [[id number]] Collected:  07/09/18 1204    Specimen:  Tissue from Pleural Cavity Updated:  07/09/18 1217    Fungus Smear - Tissue, Pleural Cavity [[id number]] Collected:  07/09/18 1204    Specimen:  Tissue from Pleural Cavity Updated:  07/09/18 1217    Fungus Culture - Body Fluid, Pleural Cavity [[id number]] Collected:  07/09/18 1153    Specimen:  Body Fluid from Pleural Cavity Updated:  07/09/18 1204    Basic Metabolic Panel [[id number]]  (Abnormal) Collected:  07/09/18 0839    Specimen:  Blood Updated:  07/09/18 0958     Glucose 211 (H) mg/dL      BUN 9 mg/dL      Creatinine 0.47 (L) mg/dL      Sodium 130 (L) mmol/L      Potassium 3.5 mmol/L      Chloride 94 (L) mmol/L      CO2 17.7 (L) mmol/L      Calcium 8.7 mg/dL      eGFR Non African Amer >150 mL/min/1.73      BUN/Creatinine Ratio 19.1     Anion Gap 18.3 mmol/L     Narrative:       GFR Normal >60  Chronic Kidney Disease <60  Kidney Failure <15    Respiratory Culture - Sputum, Cough [[id number]] Collected:  07/08/18 1302    Specimen:  Sputum from Cough Updated:  07/09/18 0939     Respiratory Culture Heavy growth (4+) Normal Respiratory Ilsa     Gram Stain Result Rare (1+) WBCs seen      Rare (1+) Epithelial cells per low  power field      Mixed bacterial morphotypes seen on Gram Stain    POC Glucose Once [[id number]]  (Abnormal) Collected:  07/09/18 0857    Specimen:  Blood Updated:  07/09/18 0858     Glucose 198 (H) mg/dL     Narrative:       Meter: [id number] : [id number] Serge THOMAS RN           Assessment/Plan     Active Problems:    DKA (diabetic ketoacidoses) (CMS/[facility])       Assessment:    Condition: In stable condition.       Plan:   Advance diet as tolerated (diabetic/consistent carb diet).  Chest x-ray.  Administer medications as ordered.       1. Paraneumonic effusion with trapped lung: S/P Left VAT with decortication; patient is stable post-op.  This morning's chest x-ray is stable. Three chest tubes in place to -40cm suction. Encouraged good pulmonary hygiene, incentive spirometry.     2. Pneumonia: Antibiotic and respiratory therapy per pulmonary service.    3. Diabetic ketoacidosis: Resolved. Patient states he is needle phobic and hasn't been taking his insulin for this reason. I have ordered a consult to diabetes educator. Potassium protocol in placement for electrolyte replacement.      4. Pain control: Scheduled Toradol. PRN Medications ordered. Transition to PO vs IV pain medication. Patient does not wish to take neurontin due to previous adverse event - although he denies allergy, states he does not tolerate and it makes him very drowsy.    RUBENS Spence  [facility]  07/10/18  7:51 AM

## 2018-07-11 ENCOUNTER — APPOINTMENT (OUTPATIENT)
Dept: GENERAL RADIOLOGY | Facility: HOSPITAL | Age: 53
End: 2018-07-11

## 2018-07-11 LAB
DEPRECATED RDW RBC AUTO: 41 FL (ref 37–54)
ERYTHROCYTE [DISTWIDTH] IN BLOOD BY AUTOMATED COUNT: 13.5 % (ref 11.5–14.5)
GLUCOSE BLDC GLUCOMTR-MCNC: 158 MG/DL (ref 70–130)
GLUCOSE BLDC GLUCOMTR-MCNC: 215 MG/DL (ref 70–130)
GLUCOSE BLDC GLUCOMTR-MCNC: 222 MG/DL (ref 70–130)
GLUCOSE BLDC GLUCOMTR-MCNC: 256 MG/DL (ref 70–130)
HCT VFR BLD AUTO: 35.7 % (ref 40.4–52.2)
HGB BLD-MCNC: 12.3 G/DL (ref 13.7–17.6)
MCH RBC QN AUTO: 28.3 PG (ref 27–32.7)
MCHC RBC AUTO-ENTMCNC: 34.5 G/DL (ref 32.6–36.4)
MCV RBC AUTO: 82.3 FL (ref 79.8–96.2)
PLATELET # BLD AUTO: 449 10*3/MM3 (ref 140–500)
PMV BLD AUTO: 8.8 FL (ref 6–12)
RBC # BLD AUTO: 4.34 10*6/MM3 (ref 4.6–6)
WBC NRBC COR # BLD: 18.66 10*3/MM3 (ref 4.5–10.7)

## 2018-07-11 PROCEDURE — 71045 X-RAY EXAM CHEST 1 VIEW: CPT

## 2018-07-11 PROCEDURE — 97162 PT EVAL MOD COMPLEX 30 MIN: CPT

## 2018-07-11 PROCEDURE — 25010000002 CEFTRIAXONE PER 250 MG: Performed by: INTERNAL MEDICINE

## 2018-07-11 PROCEDURE — 25010000002 ENOXAPARIN PER 10 MG: Performed by: INTERNAL MEDICINE

## 2018-07-11 PROCEDURE — 63710000001 INSULIN ASPART PER 5 UNITS: Performed by: INTERNAL MEDICINE

## 2018-07-11 PROCEDURE — 85027 COMPLETE CBC AUTOMATED: CPT | Performed by: INTERNAL MEDICINE

## 2018-07-11 PROCEDURE — 97110 THERAPEUTIC EXERCISES: CPT

## 2018-07-11 PROCEDURE — 25010000002 HYDROMORPHONE PER 4 MG: Performed by: INTERNAL MEDICINE

## 2018-07-11 PROCEDURE — 82962 GLUCOSE BLOOD TEST: CPT

## 2018-07-11 PROCEDURE — C2618 PROBE/NEEDLE, CRYO: HCPCS

## 2018-07-11 PROCEDURE — 94799 UNLISTED PULMONARY SVC/PX: CPT

## 2018-07-11 PROCEDURE — 25010000002 KETOROLAC TROMETHAMINE PER 15 MG: Performed by: THORACIC SURGERY (CARDIOTHORACIC VASCULAR SURGERY)

## 2018-07-11 PROCEDURE — 99024 POSTOP FOLLOW-UP VISIT: CPT | Performed by: NURSE PRACTITIONER

## 2018-07-11 PROCEDURE — 63710000001 INSULIN DETEMIR PER 5 UNITS: Performed by: INTERNAL MEDICINE

## 2018-07-11 RX ORDER — CLINDAMYCIN PHOSPHATE 600 MG/50ML
600 INJECTION INTRAVENOUS EVERY 8 HOURS
Status: COMPLETED | OUTPATIENT
Start: 2018-07-11 | End: 2018-07-13

## 2018-07-11 RX ORDER — KETOROLAC TROMETHAMINE 30 MG/ML
30 INJECTION, SOLUTION INTRAMUSCULAR; INTRAVENOUS EVERY 6 HOURS SCHEDULED
Status: COMPLETED | OUTPATIENT
Start: 2018-07-11 | End: 2018-07-13

## 2018-07-11 RX ADMIN — INSULIN ASPART 6 UNITS: 100 INJECTION, SOLUTION INTRAVENOUS; SUBCUTANEOUS at 18:00

## 2018-07-11 RX ADMIN — HYDROCODONE BITARTRATE AND ACETAMINOPHEN 2 TABLET: 7.5; 325 TABLET ORAL at 05:05

## 2018-07-11 RX ADMIN — ENOXAPARIN SODIUM 40 MG: 40 INJECTION SUBCUTANEOUS at 00:35

## 2018-07-11 RX ADMIN — KETOROLAC TROMETHAMINE 30 MG: 30 INJECTION, SOLUTION INTRAMUSCULAR; INTRAVENOUS at 00:04

## 2018-07-11 RX ADMIN — KETOROLAC TROMETHAMINE 30 MG: 30 INJECTION, SOLUTION INTRAMUSCULAR at 23:52

## 2018-07-11 RX ADMIN — CEFTRIAXONE SODIUM 1 G: 1 INJECTION, SOLUTION INTRAVENOUS at 00:04

## 2018-07-11 RX ADMIN — CLINDAMYCIN PHOSPHATE 600 MG: 600 INJECTION, SOLUTION INTRAVENOUS at 16:57

## 2018-07-11 RX ADMIN — HYDROCODONE BITARTRATE AND ACETAMINOPHEN 2 TABLET: 7.5; 325 TABLET ORAL at 16:57

## 2018-07-11 RX ADMIN — HYDROCODONE BITARTRATE AND ACETAMINOPHEN 2 TABLET: 7.5; 325 TABLET ORAL at 08:51

## 2018-07-11 RX ADMIN — CLINDAMYCIN PHOSPHATE 600 MG: 600 INJECTION, SOLUTION INTRAVENOUS at 23:52

## 2018-07-11 RX ADMIN — KETOROLAC TROMETHAMINE 30 MG: 30 INJECTION, SOLUTION INTRAMUSCULAR; INTRAVENOUS at 05:05

## 2018-07-11 RX ADMIN — HYDROMORPHONE HYDROCHLORIDE 0.5 MG: 10 INJECTION, SOLUTION INTRAMUSCULAR; INTRAVENOUS; SUBCUTANEOUS at 14:52

## 2018-07-11 RX ADMIN — HYDROCODONE BITARTRATE AND ACETAMINOPHEN 2 TABLET: 7.5; 325 TABLET ORAL at 21:19

## 2018-07-11 RX ADMIN — DOCUSATE SODIUM 100 MG: 100 CAPSULE, LIQUID FILLED ORAL at 08:47

## 2018-07-11 RX ADMIN — AZITHROMYCIN 500 MG: 250 TABLET, FILM COATED ORAL at 08:47

## 2018-07-11 RX ADMIN — KETOROLAC TROMETHAMINE 30 MG: 30 INJECTION, SOLUTION INTRAMUSCULAR at 18:09

## 2018-07-11 RX ADMIN — INSULIN DETEMIR 20 UNITS: 100 INJECTION, SOLUTION SUBCUTANEOUS at 08:47

## 2018-07-11 RX ADMIN — HYDROCODONE BITARTRATE AND ACETAMINOPHEN 2 TABLET: 7.5; 325 TABLET ORAL at 12:33

## 2018-07-11 RX ADMIN — KETOROLAC TROMETHAMINE 30 MG: 30 INJECTION, SOLUTION INTRAMUSCULAR; INTRAVENOUS at 11:02

## 2018-07-11 RX ADMIN — CEFTRIAXONE SODIUM 1 G: 1 INJECTION, SOLUTION INTRAVENOUS at 23:51

## 2018-07-11 RX ADMIN — HYDROCODONE BITARTRATE AND ACETAMINOPHEN 2 TABLET: 7.5; 325 TABLET ORAL at 00:04

## 2018-07-11 RX ADMIN — INSULIN DETEMIR 20 UNITS: 100 INJECTION, SOLUTION SUBCUTANEOUS at 21:19

## 2018-07-11 RX ADMIN — INSULIN ASPART 2 UNITS: 100 INJECTION, SOLUTION INTRAVENOUS; SUBCUTANEOUS at 08:00

## 2018-07-11 RX ADMIN — METOPROLOL TARTRATE 25 MG: 25 TABLET ORAL at 21:18

## 2018-07-11 RX ADMIN — INSULIN ASPART 4 UNITS: 100 INJECTION, SOLUTION INTRAVENOUS; SUBCUTANEOUS at 12:00

## 2018-07-11 RX ADMIN — INSULIN ASPART 7 UNITS: 100 INJECTION, SOLUTION INTRAVENOUS; SUBCUTANEOUS at 21:19

## 2018-07-11 RX ADMIN — METOPROLOL TARTRATE 25 MG: 25 TABLET ORAL at 08:47

## 2018-07-11 NOTE — NURSING NOTE
Nurse walk in room pt. Sitting in chair attempting to use urinal, pt girl friend pulled the curtain blocking nurse from coming in stating that patients wants pt wants privacy while urinating. Nurse asked girlfriend who had transferred pt, with chest tubes. Girl friend  States she did. Girl friend and patient educated on calling for help when wanting to transfer to reduce that chances of chest tubes being dislodged.

## 2018-07-11 NOTE — PLAN OF CARE
Problem: Patient Care Overview  Goal: Plan of Care Review  Outcome: Ongoing (interventions implemented as appropriate)   07/11/18 1412   Coping/Psychosocial   Plan of Care Reviewed With patient   OTHER   Outcome Summary VSS. pain controlled with prn pain medication, 3 chest tubes noted pt decreased to -20 suction, possible 1 chest tube removal tomorrow. pt continues on room air, no s/s of discomfort or distress. will continue to montior    Plan of Care Review   Progress improving     Goal: Individualization and Mutuality  Outcome: Ongoing (interventions implemented as appropriate)    Goal: Discharge Needs Assessment  Outcome: Ongoing (interventions implemented as appropriate)      Problem: Skin Injury Risk (Adult)  Goal: Identify Related Risk Factors and Signs and Symptoms  Outcome: Ongoing (interventions implemented as appropriate)    Goal: Skin Health and Integrity  Outcome: Ongoing (interventions implemented as appropriate)      Problem: Sepsis/Septic Shock (Adult)  Goal: Signs and Symptoms of Listed Potential Problems Will be Absent, Minimized or Managed (Sepsis/Septic Shock)  Outcome: Ongoing (interventions implemented as appropriate)      Problem: Diabetes, Type 2 (Adult)  Goal: Signs and Symptoms of Listed Potential Problems Will be Absent, Minimized or Managed (Diabetes, Type 2)  Outcome: Ongoing (interventions implemented as appropriate)      Problem: Fall Risk (Adult)  Goal: Identify Related Risk Factors and Signs and Symptoms  Outcome: Ongoing (interventions implemented as appropriate)    Goal: Absence of Fall  Outcome: Ongoing (interventions implemented as appropriate)      Problem: Pain, Acute (Adult)  Goal: Identify Related Risk Factors and Signs and Symptoms  Outcome: Ongoing (interventions implemented as appropriate)    Goal: Acceptable Pain Control/Comfort Level  Outcome: Ongoing (interventions implemented as appropriate)

## 2018-07-11 NOTE — NURSING NOTE
Pt refused CBC lab draw this a.m per night shift nurse, patient educated and stated he will allow lab to draw blood. Spoke with janie in lab states someone will be up to draw

## 2018-07-11 NOTE — PROGRESS NOTES
[facility]  Phone: [phone]  Arnulfo Garcia MD    Subjective:  LOS: 4    He appears comfortable. Denies much pain, controlled with po meds.    Objective   Vital Signs past 24hrs    Temp range: Temp (24hrs), Av.5 °F (36.9 °C), Min:98.2 °F (36.8 °C), Max:98.7 °F (37.1 °C)    BP range: BP: (117-141)/(76-94) 141/94  Pulse range: Heart Rate:  [] 94  Resp rate range: Resp:  [18] 18    Device (Oxygen Therapy): room airFlow (L/min):  [2] 2  Oxygen range:SpO2:  [97 %] 97 %      107 kg (235 lb 0.2 oz); Body mass index is 35.73 kg/m².    Intake/Output Summary (Last 24 hours) at 18 1535  Last data filed at 18 1300   Gross per 24 hour   Intake              960 ml   Output             1500 ml   Net             -540 ml       Physical Exam   Cardiovascular: Normal rate and regular rhythm.    No murmur heard.  Pulmonary/Chest: He has no wheezes. He has rales (few) in the left lower field.   Left chest tube to suction   Abdominal: Soft. Bowel sounds are normal. There is no tenderness.   Musculoskeletal: He exhibits no edema.   Neurological: He is alert.   Nursing note and vitals reviewed.    Results Review:    I have reviewed the laboratory and imaging data since the last note by [facility] physician.  My annotations are noted in assessment and plan.    Medication Review:  I have reviewed the current MAR.  My annotations are noted in assessment and plan.      lactated ringers 9 mL/hr    sodium chloride 9 mL/hr Last Rate: 9 mL/hr (18 0532)     Plan   PCCM Problems  Left empyema, s/p vats  LLL pneumonia  Sepsis resolved  Relevant Medical Diagnoses  DKA, resolved  Poorly controlled DM  HTN    Plan of Treatment  WBC is up despite 6-7 days of abx. Add Clindamycin for 14 days. Allergic to penicillin (tolerated rocephin).    Now on Insulin - needs NPH before discharge (cheaper) and major education.    Consider basilio for ANIYA as outpatient.    Arnulfo Garcia MD  18  3:35 PM    Part of this note may be an  electronic transcription/translation of spoken language to printed text using the Dragon Dictation System.

## 2018-07-11 NOTE — PLAN OF CARE
Problem: Patient Care Overview  Goal: Plan of Care Review   07/11/18 3172   OTHER   Outcome Summary Pt POD2 L VAT and bronchoscopy. Pt previously independent. Received pain meds prior to session. Able to ambulate with assist of 1; 2nd assist needed for equipment managment. Will see pt to improve independence, mobility and strength. Recommend pt ambulate with nursing to continue to improve activity tolerance. Anticipate d/c home when medically able. Will progress towards goals as tolerated.

## 2018-07-11 NOTE — PROGRESS NOTES
"    Chief Complaint: follow-up trapped lung, parapneumonic effusion  S/P: Left video assisted thoracoscopy with decortication  POD # 2    Subjective     Symptoms:  Improved.  He reports cough, chest pain and weakness.  No shortness of breath.    Diet:  Adequate intake.  No nausea or vomiting.    Activity level: Impaired due to weakness.    Pain:  He complains of pain that is moderate.  He reports pain is improving.  Pain is well controlled and requiring pain medication.       Vital Signs:  Temp:  [98.2 °F (36.8 °C)-98.7 °F (37.1 °C)] 98.2 °F (36.8 °C)  Heart Rate:  [] 95  Resp:  [18] 18  BP: (117-140)/(76-86) 140/86    Intake & Output (last day)       07/10 0701 - 07/11 0700 07/11 0701 - 07/12 0700    P.O. 1200 240    I.V. (mL/kg)      Other      IV Piggyback      Total Intake(mL/kg) 1200 (11.2) 240 (2.2)    Urine (mL/kg/hr) 1650 (0.6)     Other      Stool 2 (0)     Blood      Chest Tube 100 (0)     Total Output 1752      Net -552 +240          Unmeasured Urine Occurrence 1 x     Unmeasured Stool Occurrence 2 x           Objective:  General Appearance:  Comfortable, well-appearing, in no acute distress and not in pain.    Vital signs: (most recent): Blood pressure 140/86, pulse 95, temperature 98.2 °F (36.8 °C), temperature source Oral, resp. rate 18, height 172.7 cm (68\"), weight 107 kg (235 lb 0.2 oz), SpO2 97 %.  Vital signs are normal.  No fever.    Output: Producing urine and producing stool.    HEENT: Normal HEENT exam.    Lungs:  Normal effort and normal respiratory rate.  He is not in respiratory distress.  There are decreased breath sounds.  (Diminished breath sounds auscultated in left lung fields)  Heart: Tachycardia.  Regular rhythm.  S1 normal and S2 normal.  No murmur.   Chest: Chest wall tenderness present.    Abdomen: Abdomen is soft and non-distended.  Bowel sounds are normal.   There is no abdominal tenderness.   There is no mass.   Extremities: Normal range of motion.  There is no dependent " edema.    Pulses: Distal pulses are intact.    Neurological: Patient is alert and oriented to person, place and time.  Normal strength.    Pupils:  Pupils are equal, round, and reactive to light.    Skin:  Warm and dry.              Chest tube:   Site: Left, Clean, Dry, Intact and Securement device intact  Suction: -40 cm  Air Leak: negative  Level: 31/142/170  24 Hour Total: 10/42/48    Results Review:     I reviewed the patient's new clinical results.  I reviewed the patient's new imaging results and agree with the interpretation.  Discussed with patient, his significant other, RN and Dr. Oliver.    Imaging Results (last 24 hours)     Procedure Component Value Units Date/Time    XR Chest 1 View [[id number]] Collected:  07/11/18 0818     Updated:  07/11/18 0925    Narrative:       CLINICAL HISTORY: 52-year-old male 2 days postop left video-assisted  thoracoscopy with decortication. Follow-up with left chest tubes.     PORTABLE AP SEMIERECT CHEST DATED 07/11/2018 AT 0556 HOURS     FINDINGS: When compared to the most recent available prior chest  radiograph dated 07/10/2018 at 0652 hours, the 3 left chest tubes  remain. A 2 mm left apical pleural separation remains. There may be a  trace of medial left lower chest pleural separation. No large  pneumothorax is seen on the current exam. Fairly extensive patchy  opacity throughout the left lung with some dense opacity in the basilar  left lung remain. Thickening of lateral pleural stripe at the lateral to  lower lateral left chest remains. Modest hazy to patchy opacity in the  right lung persists. Lung volumes are similar to minimally increased on  the current exam. Borderline to mild cardiomegaly remains.     CONCLUSION: Slightly increased lung volumes with similar patchy to dense  opacities in the left chest and persistent hazy to patchy opacity in the  perihilar and basilar right chest. The 3 left chest tubes remain, and  there is again a small left apical  pneumothorax.     This report was finalized on 7/11/2018 9:21 AM by Dr. Franko Zapien M.D.       XR Chest 1 View [[id number]] Collected:  07/09/18 1454     Updated:  07/10/18 1356    Narrative:       PORTABLE CHEST     HISTORY: Postop management.     COMPARISON: None.     A single portable view of the chest demonstrates the heart to be within  normal limits in size. Three left-sided chest tubes are noted. The 2  superior chest tubes demonstrates side ports superimposed over the  lateral margin of the hemithorax. There is no evidence of pneumothorax.  There is mild prominence of the pulmonary vasculature suggesting mild  congestive changes. There is increased density present at the left lung  base which may represent focal infiltrate/atelectasis and layering  pleural fluid. There is no evidence of consolidation. A follow-up PA and  lateral view of the chest is suggested.     The above information was called to the patient's nurse at the time of  the dictation. The patient's nurse is to relay the information to the  clinical service.     This report was finalized on 7/10/2018 1:53 PM by Dr. Ko Burnette M.D.             Lab Results:     Lab Results (last 24 hours)     Procedure Component Value Units Date/Time    CBC (No Diff) [[id number]]  (Abnormal) Collected:  07/11/18 0817    Specimen:  Blood Updated:  07/11/18 0832     WBC 18.66 (H) 10*3/mm3      RBC 4.34 (L) 10*6/mm3      Hemoglobin 12.3 (L) g/dL      Hematocrit 35.7 (L) %      MCV 82.3 fL      MCH 28.3 pg      MCHC 34.5 g/dL      RDW 13.5 %      RDW-SD 41.0 fl      MPV 8.8 fL      Platelets 449 10*3/mm3     Body Fluid Culture - Body Fluid, Pleural Cavity [[id number]]  (Normal) Collected:  07/09/18 1153    Specimen:  Body Fluid from Pleural Cavity Updated:  07/11/18 0700     BF Culture No growth at 2 days     Gram Stain Result Rare (1+) WBCs seen      No organisms seen    Tissue / Bone Culture - Tissue, Pleural Cavity [[id number]]  (Normal) Collected:  07/09/18  1204    Specimen:  Tissue from Pleural Cavity Updated:  07/11/18 0641     Tissue Culture No growth at 2 days     Gram Stain Result Rare (1+) WBCs seen      No organisms seen    POC Glucose Once [[id number]]  (Abnormal) Collected:  07/11/18 0611    Specimen:  Blood Updated:  07/11/18 0612     Glucose 158 (H) mg/dL     Narrative:       Meter: [id number] : [id number] Mary Thee NA    Potassium [[id number]]  (Normal) Collected:  07/10/18 2038    Specimen:  Blood Updated:  07/10/18 2111     Potassium 4.6 mmol/L     POC Glucose Once [[id number]]  (Abnormal) Collected:  07/10/18 2009    Specimen:  Blood Updated:  07/10/18 2011     Glucose 305 (H) mg/dL     Narrative:       Meter: [id number] : [id number] Mary Thee NA    POC Glucose Once [[id number]]  (Abnormal) Collected:  07/10/18 1653    Specimen:  Blood Updated:  07/10/18 1654     Glucose 243 (H) mg/dL     Narrative:       Meter: [id number] : [id number] Alfonso CAMILO    POC Glucose Once [[id number]]  (Abnormal) Collected:  07/09/18 1344    Specimen:  Blood Updated:  07/10/18 1624     Glucose 261 (H) mg/dL     Narrative:       Meter: [id number] : [id number] Eric ARCE RN    POC Glucose Once [[id number]]  (Normal) Collected:  07/07/18 1931    Specimen:  Blood Updated:  07/10/18 1623     Glucose 119 mg/dL     Narrative:       Meter: [id number] : [id number] Mónica Cintron RN    POC Glucose Once [[id number]]  (Abnormal) Collected:  07/07/18 1811    Specimen:  Blood Updated:  07/10/18 1623     Glucose 146 (H) mg/dL     Narrative:       Meter: [id number] : [id number] Kar Carolina RN    Tissue Pathology Exam [[id number]] Collected:  07/09/18 1249    Specimen:  Tissue from Pleura Updated:  07/10/18 1338     Case Report --     Surgical Pathology Report                         Case: [id number]                                  Authorizing Provider:  Thomas Oliver III, MD   Collected:           07/09/2018 12:49 PM          Ordering  "Location:     [facility]  Received:            07/09/2018 02:07 PM                                 MAIN OR                                                                      Pathologist:           Gera Iniguez MD                                                          Specimen:    Pleura, left pleura                                                                         Final Diagnosis --     LEFT PLEURA, EXCISION:    SEVERE ACUTE AND FIBRINOUS PLEURITIS.   FOCAL REACTIVE STROMAL CHANGES.    NO EVIDENCE OF MALIGNANCY.    TDJ/brb IHC/a/LAVERN    CPT CODES:  1.  33718       Gross Description --     1.  Received in formalin labeled \"left pleura\" is a 6 x 6 x 3 cm aggregate of pink tan to hemorrhagic necrotic membraneous tissue fragments.  There are no discrete masses seen.  CC/USO/TDJ/brb       Microscopic Description --     Microscopic performed, incorporated in diagnosis.       POC Glucose Once [[id number]]  (Abnormal) Collected:  07/10/18 1109    Specimen:  Blood Updated:  07/10/18 1110     Glucose 195 (H) mg/dL     Narrative:       Meter: [id number] : [id number] Alfonso CAMILO           Assessment/Plan     Active Problems:    DKA (diabetic ketoacidoses) (CMS/[facility])       Assessment:    Condition: In stable condition.  Improving.       Plan:   Encourage ambulation, out of bed and up to chair and per physical therapy.  Start/continue incentive spirometry.  Regular diet.  Chest x-ray.  Administer medications as ordered.       1. Paraneumonic effusion with trapped lung: S/P Left VAT with decortication; patient is stable post-op.  This morning's chest x-ray is stable, will order a follow-up x-ray for the morning. Three chest tubes weaned to -20cm suction. Encouraged good pulmonary hygiene, incentive spirometry.      2. Pneumonia: Antibiotic and respiratory therapy per pulmonary service.     3. Diabetic ketoacidosis: Resolved. Patient states he is needle phobic and hasn't been taking his insulin " for this reason. Diabetes educator has seen him and he found it helpful. They are helping him with discharge planning for DM management.     4. Pain control: Scheduled Toradol. PRN Medications ordered. Transition to PO vs IV pain medication. Patient does not wish to take neurontin due to previous adverse event - although he denies allergy, states he does not tolerate and it makes him very drowsy.    Patient refused physical therapy yesterday.  I have encouraged him to work with him today and to increase ambulation and have meals up in chair.    RUBENS Spence  [facility]  07/11/18  11:07 AM

## 2018-07-11 NOTE — SIGNIFICANT NOTE
07/11/18 1034   Rehab Time/Intention   Evaluation Not Performed other (see comments)  (Pt requesting pain meds prior to ambulation. Pt not due for pain meds for 30min. Will follow up after pain meds. )   Rehab Treatment   Discipline physical therapist   Recommendation   PT - Next Appointment 07/11/18

## 2018-07-11 NOTE — THERAPY EVALUATION
Acute Care - Physical Therapy Initial Evaluation  [facility]     Patient Name: Myles Toledo  : 1965  MRN: [id number]  Today's Date: 2018         Referring Physician: [place]      Admit Date: 2018    Visit Dx:     ICD-10-CM ICD-9-CM   1. Pleural effusion J90 511.9   2. Pneumonia J18.9 486   3. Decreased mobility R26.89 781.99     Patient Active Problem List   Diagnosis   • DKA (diabetic ketoacidoses) (CMS/HCC)     Past Medical History:   Diagnosis Date   • Diabetes mellitus (CMS/HCC)    • Hypertension      Past Surgical History:   Procedure Laterality Date   • EYE SURGERY     • INNER EAR SURGERY     • THORACOSCOPY Left 2018    Procedure: BRONCHOSCOPY, LEFT VIDEO ASSISTED THORACOSCOPY WITH DECORITCATION, INTERCOSTAL NERVE BLOCK WITH CRYO ABLATION;  Surgeon: Thomas Oliver III, MD;  Location: [facility] MAIN OR;  Service: Thoracic        PT ASSESSMENT (last 12 hours)      Physical Therapy Evaluation     Row Name 18 1158          PT Evaluation Time/Intention    Subjective Information no complaints  -MG     Document Type evaluation;therapy note (daily note)  -MG     Mode of Treatment physical therapy;individual therapy  -MG     Patient Effort good  -MG     Symptoms Noted During/After Treatment none  -MG     Comment requests pain meds prior to PT  -MG     Row Name 18 2627          General Information    Patient Profile Reviewed? yes  -MG     Referring Physician Verona  -     Patient Observations alert;agree to therapy;cooperative  -MG     Patient/Family Observations sitting up in chair, no acute distress  -MG     Prior Level of Function independent:  -MG     Equipment Currently Used at Home none  -MG     Pertinent History of Current Functional Problem POD2 VAT, bronchoscopy  -MG     Existing Precautions/Restrictions fall   3 chest tubes on suction   -MG     Barriers to Rehab none identified  -MG     Row Name 18 115          Relationship/Environment    Lives With  significant other  -MG     Row Name 07/11/18 1153          Resource/Environmental Concerns    Current Living Arrangements home/apartment/condo  -MG     Row Name 07/11/18 1153          Home Main Entrance    Number of Stairs, Main Entrance --   26  -MG     Stair Railings, Main Entrance --   1 handrail   -MG     Row Name 07/11/18 1153          Cognitive Assessment/Interventions    Additional Documentation Cognitive Assessment/Intervention (Group)  -MG     Row Name 07/11/18 1153          Cognitive Assessment/Intervention- PT/OT    Affect/Mental Status (Cognitive) WNL  -MG     Orientation Status (Cognition) oriented x 4  -MG     Follows Commands (Cognition) WNL  -MG     Personal Safety Interventions fall prevention program maintained;muscle strengthening facilitated;supervised activity;nonskid shoes/slippers when out of bed  -MG     Row Name 07/11/18 1153          Safety Issues, Functional Mobility    Impairments Affecting Function (Mobility) endurance/activity tolerance;strength  -MG     [facility] Name 07/11/18 1153          Bed Mobility Assessment/Treatment    Comment (Bed Mobility) not tested, up in chair   -MG     [facility] Name 07/11/18 1153          Transfer Assessment/Treatment    Transfer Assessment/Treatment stand-sit transfer;sit-stand transfer  -MG     Sit-Stand [place] (Transfers) contact guard  -MG     Stand-Sit [place] (Transfers) contact guard  -MG     Row Name 07/11/18 1153          Sit-Stand Transfer    Assistive Device (Sit-Stand Transfers) walker, rolling platform  -MG     Row Name 07/11/18 1153          Stand-Sit Transfer    Assistive Device (Stand-Sit Transfers) walker, rolling platform  -MG     Row Name 07/11/18 1153          Gait/Stairs Assessment/Training    [place] Level (Gait) contact guard;1 person to manage equipment  -MG     Assistive Device (Gait) walker, rolling platform  -MG     Distance in Feet (Gait) 150  -MG     Pattern (Gait) swing-through  -MG     Deviations/Abnormal Patterns  (Gait) chau decreased;stride length decreased  -MG     Comment (Gait/Stairs) knees slightly flexed, forward flexed leaning on walker   -MG     Row Name 07/11/18 1153          General ROM    GENERAL ROM COMMENTS LEs WFL   -MG     Row Name 07/11/18 1153          General Assessment (Manual Muscle Testing)    Comment, General Manual Muscle Testing (MMT) Assessment LEs generalized post-surgical weakness   -MG     Row Name 07/11/18 1153          Motor Assessment/Intervention    Additional Documentation Balance (Group)  -MG     Row Name 07/11/18 1153          Balance    Balance static standing balance  -MG     Row Name 07/11/18 1153          Static Standing Balance    Level of [place] (Supported Standing, Static Balance) contact guard assist  -MG     Time Able to Maintain Position (Supported Standing, Static Balance) 3 to 4 minutes  -MG     Assistive Device Utilized (Supported Standing, Static Balance) --   platform walker   -MG     Row Name             Wound 07/09/18 1305 Left chest incision    Wound - Properties Group Date first assessed: 07/09/18  -MM Time first assessed: 1305  -MM Side: Left  -MM Location: chest  -MM Type: incision  -MM    Row Name 07/11/18 1153          Physical Therapy Clinical Impression    Patient/Family Goals Statement (PT Clinical Impression) return home   -MG     Criteria for Skilled Interventions Met (PT Clinical Impression) yes;treatment indicated  -MG     Impairments Found (describe specific impairments) aerobic capacity/endurance;gait, locomotion, and balance;muscle performance  -MG     Rehab Potential (PT Clinical Summary) good, to achieve stated therapy goals  -MG     Row Name 07/11/18 1153          Physical Therapy Goals    Bed Mobility Goal Selection (PT) bed mobility, PT goal 1  -MG     Transfer Goal Selection (PT) transfer, PT goal 1  -MG     Gait Training Goal Selection (PT) gait training, PT goal 1  -MG     Stairs Goal Selection (PT) stairs, PT goal 1  -MG     Additional  Documentation Stairs Goal Selection (PT) (Row)  -MG     Row Name 07/11/18 1153          Bed Mobility Goal 1 (PT)    Activity/Assistive Device (Bed Mobility Goal 1, PT) sit to supine/supine to sit  -MG     [place] Level/Cues Needed (Bed Mobility Goal 1, PT) conditional independence  -MG     Time Frame (Bed Mobility Goal 1, PT) 1 week  -MG     Row Name 07/11/18 1153          Transfer Goal 1 (PT)    Activity/Assistive Device (Transfer Goal 1, PT) sit-to-stand/stand-to-sit;bed-to-chair/chair-to-bed  -MG     [place] Level/Cues Needed (Transfer Goal 1, PT) independent  -MG     Time Frame (Transfer Goal 1, PT) 1 week  -MG     Row Name 07/11/18 1153          Gait Training Goal 1 (PT)    Activity/Assistive Device (Gait Training Goal 1, PT) gait (walking locomotion)  -MG     [place] Level (Gait Training Goal 1, PT) independent  -MG     Distance (Gait Goal 1, PT) 200  -MG     Time Frame (Gait Training Goal 1, PT) 1 week  -MG     Row Name 07/11/18 1153          Stairs Goal 1 (PT)    Activity/Assistive Device (Stairs Goal 1, PT) ascending stairs;descending stairs   1 handrail   -MG     [place] Level/Cues Needed (Stairs Goal 1, PT) supervision required  -MG     Number of Stairs (Stairs Goal 1, PT) 26  -MG     Time Frame (Stairs Goal 1, PT) 1 week  -MG     Row Name 07/11/18 1153          Positioning and Restraints    Pre-Treatment Position sitting in chair/recliner  -MG     Post Treatment Position chair  -MG     In Chair sitting;call light within reach;encouraged to call for assist;with family/caregiver   no alarm on when entering room   -MG     Row Name 07/11/18 1153          Living Environment    Home Accessibility stairs to enter home  -MG       User Key  (r) = Recorded By, (t) = Taken By, (c) = Cosigned By    Initials Name Provider Type    FRANCO Lobo, RN Registered Nurse    MG Megan Gosselin, PT Physical Therapist          Physical Therapy Education     Title: PT OT SLP Therapies (Active)      Topic: Physical Therapy (Active)     Point: Mobility training (Done)    Learning Progress Summary     Learner Status Readiness Method Response Comment Documented by    Patient Done Acceptance E DOTTY PHAM  MG 07/11/18 1158          Point: Precautions (Done)    Learning Progress Summary     Learner Status Readiness Method Response Comment Documented by    Patient Done Acceptance E DOTTY PHAM  MG 07/11/18 1158                      User Key     Initials Effective Dates Name Provider Type Discipline     04/03/18 -  Megan Gosselin, PT Physical Therapist PT                PT Recommendation and Plan  Anticipated Discharge Disposition (PT): home with assist  Planned Therapy Interventions (PT Eval): balance training, bed mobility training, gait training, home exercise program, patient/family education, stair training, strengthening, transfer training  Therapy Frequency (PT Clinical Impression): daily  Outcome Summary/Treatment Plan (PT)  Anticipated Discharge Disposition (PT): home with assist  Outcome Summary: Pt POD2 L VAT and bronchoscopy. Pt previously independent. Received pain meds prior to session. Able to ambulate with assist of 1; 2nd assist needed for equipment managment. Will see pt to improve independence, mobility and strength. Anticipate d/c home when medically able. Will progress towards goals as tolerated.           Outcome Measures     Row Name 07/11/18 1200             How much help from another person do you currently need...    Turning from your back to your side while in flat bed without using bedrails? 3  -MG      Moving from lying on back to sitting on the side of a flat bed without bedrails? 3  -MG      Moving to and from a bed to a chair (including a wheelchair)? 3  -MG      Standing up from a chair using your arms (e.g., wheelchair, bedside chair)? 3  -MG      Climbing 3-5 steps with a railing? 3  -MG      To walk in hospital room? 3  -MG      AM-PAC 6 Clicks Score 18  -MG         Functional Assessment     Outcome Measure Options AM-PAC 6 Clicks Basic Mobility (PT)  -MG        User Key  (r) = Recorded By, (t) = Taken By, (c) = Cosigned By    Initials Name Provider Type    MG Megan Gosselin, PT Physical Therapist           Time Calculation:         PT Charges     Row Name 07/11/18 1200 07/11/18 1034          Time Calculation    Start Time 1130  -MG  --     Stop Time 1150  -MG  --     Time Calculation (min) 20 min  -MG  --     PT Received On 07/11/18  -MG  --     PT - Next Appointment 07/12/18  -MG 07/11/18  -MG     PT Goal Re-Cert Due Date 07/18/18  -MG  --        Time Calculation- PT    Total Timed Code Minutes- PT 8 minute(s)  -MG  --       User Key  (r) = Recorded By, (t) = Taken By, (c) = Cosigned By    Initials Name Provider Type    MG Megan Gosselin, PT Physical Therapist        Therapy Suggested Charges     Code   Minutes Charges    None           Therapy Charges for Today     Code Description Service Date Service Provider Modifiers Qty    52735978759 HC PT EVAL MOD COMPLEXITY 2 7/11/2018 Megan Gosselin, PT GP 1    43286249559 HC PT THER PROC EA 15 MIN 7/11/2018 Megan Gosselin, PT GP 1          PT G-Codes  Outcome Measure Options: AM-PAC 6 Clicks Basic Mobility (PT)      Megan Gosselin, PT  7/11/2018

## 2018-07-11 NOTE — PLAN OF CARE
Problem: Patient Care Overview  Goal: Plan of Care Review  Outcome: Ongoing (interventions implemented as appropriate)   07/10/18 1805 07/11/18 0309   Coping/Psychosocial   Plan of Care Reviewed With patient --    OTHER   Outcome Summary --  VSS, pain controlled with [place] and Toradol. CTs remain to -40 suction with minimal output. Discussed need for DM management post d/c, finances may hinder. Rested through night, will continue to monitor.   Plan of Care Review   Progress improving --        Problem: Skin Injury Risk (Adult)  Goal: Identify Related Risk Factors and Signs and Symptoms  Outcome: Ongoing (interventions implemented as appropriate)      Problem: Sepsis/Septic Shock (Adult)  Goal: Signs and Symptoms of Listed Potential Problems Will be Absent, Minimized or Managed (Sepsis/Septic Shock)  Outcome: Ongoing (interventions implemented as appropriate)      Problem: Diabetes, Type 2 (Adult)  Goal: Signs and Symptoms of Listed Potential Problems Will be Absent, Minimized or Managed (Diabetes, Type 2)  Outcome: Ongoing (interventions implemented as appropriate)      Problem: Fall Risk (Adult)  Goal: Identify Related Risk Factors and Signs and Symptoms  Outcome: Ongoing (interventions implemented as appropriate)    Goal: Absence of Fall  Outcome: Ongoing (interventions implemented as appropriate)      Problem: Pain, Acute (Adult)  Goal: Identify Related Risk Factors and Signs and Symptoms  Outcome: Ongoing (interventions implemented as appropriate)    Goal: Acceptable Pain Control/Comfort Level  Outcome: Ongoing (interventions implemented as appropriate)

## 2018-07-12 ENCOUNTER — APPOINTMENT (OUTPATIENT)
Dept: GENERAL RADIOLOGY | Facility: HOSPITAL | Age: 53
End: 2018-07-12

## 2018-07-12 LAB
ALBUMIN SERPL-MCNC: 2.5 G/DL (ref 3.5–5.2)
ALBUMIN/GLOB SERPL: 0.6 G/DL
ALP SERPL-CCNC: 109 U/L (ref 39–117)
ALT SERPL W P-5'-P-CCNC: 6 U/L (ref 1–41)
ANION GAP SERPL CALCULATED.3IONS-SCNC: 10.6 MMOL/L
AST SERPL-CCNC: 5 U/L (ref 1–40)
BACTERIA FLD CULT: NO GROWTH
BACTERIA SPEC AEROBE CULT: NO GROWTH
BACTERIA SPEC AEROBE CULT: NORMAL
BACTERIA SPEC AEROBE CULT: NORMAL
BILIRUB SERPL-MCNC: 0.3 MG/DL (ref 0.1–1.2)
BUN BLD-MCNC: 10 MG/DL (ref 6–20)
BUN/CREAT SERPL: 21.7 (ref 7–25)
CALCIUM SPEC-SCNC: 8.9 MG/DL (ref 8.6–10.5)
CHLORIDE SERPL-SCNC: 96 MMOL/L (ref 98–107)
CO2 SERPL-SCNC: 27.4 MMOL/L (ref 22–29)
CREAT BLD-MCNC: 0.46 MG/DL (ref 0.76–1.27)
DEPRECATED RDW RBC AUTO: 40.4 FL (ref 37–54)
ERYTHROCYTE [DISTWIDTH] IN BLOOD BY AUTOMATED COUNT: 13.3 % (ref 11.5–14.5)
GFR SERPL CREATININE-BSD FRML MDRD: >150 ML/MIN/1.73
GLOBULIN UR ELPH-MCNC: 4.3 GM/DL
GLUCOSE BLD-MCNC: 100 MG/DL (ref 65–99)
GLUCOSE BLDC GLUCOMTR-MCNC: 217 MG/DL (ref 70–130)
GLUCOSE BLDC GLUCOMTR-MCNC: 236 MG/DL (ref 70–130)
GLUCOSE BLDC GLUCOMTR-MCNC: 250 MG/DL (ref 70–130)
GLUCOSE BLDC GLUCOMTR-MCNC: 96 MG/DL (ref 70–130)
GRAM STN SPEC: NORMAL
HCT VFR BLD AUTO: 33.7 % (ref 40.4–52.2)
HGB BLD-MCNC: 11 G/DL (ref 13.7–17.6)
MAGNESIUM SERPL-MCNC: 2.1 MG/DL (ref 1.6–2.6)
MCH RBC QN AUTO: 27.4 PG (ref 27–32.7)
MCHC RBC AUTO-ENTMCNC: 32.6 G/DL (ref 32.6–36.4)
MCV RBC AUTO: 84 FL (ref 79.8–96.2)
PHOSPHATE SERPL-MCNC: 4.8 MG/DL (ref 2.5–4.5)
PLATELET # BLD AUTO: 421 10*3/MM3 (ref 140–500)
PMV BLD AUTO: 9.2 FL (ref 6–12)
POTASSIUM BLD-SCNC: 3.4 MMOL/L (ref 3.5–5.2)
POTASSIUM BLD-SCNC: 4.7 MMOL/L (ref 3.5–5.2)
PROT SERPL-MCNC: 6.8 G/DL (ref 6–8.5)
RBC # BLD AUTO: 4.01 10*6/MM3 (ref 4.6–6)
SODIUM BLD-SCNC: 134 MMOL/L (ref 136–145)
WBC NRBC COR # BLD: 13.4 10*3/MM3 (ref 4.5–10.7)

## 2018-07-12 PROCEDURE — 82962 GLUCOSE BLOOD TEST: CPT

## 2018-07-12 PROCEDURE — 25010000002 ENOXAPARIN PER 10 MG: Performed by: INTERNAL MEDICINE

## 2018-07-12 PROCEDURE — 85027 COMPLETE CBC AUTOMATED: CPT | Performed by: INTERNAL MEDICINE

## 2018-07-12 PROCEDURE — 84100 ASSAY OF PHOSPHORUS: CPT | Performed by: INTERNAL MEDICINE

## 2018-07-12 PROCEDURE — 83735 ASSAY OF MAGNESIUM: CPT | Performed by: INTERNAL MEDICINE

## 2018-07-12 PROCEDURE — 94799 UNLISTED PULMONARY SVC/PX: CPT

## 2018-07-12 PROCEDURE — 25010000002 HYDROMORPHONE PER 4 MG: Performed by: INTERNAL MEDICINE

## 2018-07-12 PROCEDURE — 63710000001 INSULIN ASPART PER 5 UNITS: Performed by: INTERNAL MEDICINE

## 2018-07-12 PROCEDURE — 97110 THERAPEUTIC EXERCISES: CPT

## 2018-07-12 PROCEDURE — 25010000002 KETOROLAC TROMETHAMINE PER 15 MG: Performed by: THORACIC SURGERY (CARDIOTHORACIC VASCULAR SURGERY)

## 2018-07-12 PROCEDURE — 84132 ASSAY OF SERUM POTASSIUM: CPT | Performed by: INTERNAL MEDICINE

## 2018-07-12 PROCEDURE — 80053 COMPREHEN METABOLIC PANEL: CPT | Performed by: INTERNAL MEDICINE

## 2018-07-12 PROCEDURE — 63710000001 INSULIN DETEMIR PER 5 UNITS: Performed by: INTERNAL MEDICINE

## 2018-07-12 PROCEDURE — 25010000002 CEFTRIAXONE PER 250 MG: Performed by: INTERNAL MEDICINE

## 2018-07-12 PROCEDURE — 25010000002 FUROSEMIDE PER 20 MG: Performed by: NURSE PRACTITIONER

## 2018-07-12 PROCEDURE — 99024 POSTOP FOLLOW-UP VISIT: CPT | Performed by: NURSE PRACTITIONER

## 2018-07-12 PROCEDURE — 71045 X-RAY EXAM CHEST 1 VIEW: CPT

## 2018-07-12 RX ORDER — FUROSEMIDE 10 MG/ML
20 INJECTION INTRAMUSCULAR; INTRAVENOUS ONCE
Status: COMPLETED | OUTPATIENT
Start: 2018-07-12 | End: 2018-07-12

## 2018-07-12 RX ADMIN — HYDROMORPHONE HYDROCHLORIDE 0.5 MG: 10 INJECTION, SOLUTION INTRAMUSCULAR; INTRAVENOUS; SUBCUTANEOUS at 09:52

## 2018-07-12 RX ADMIN — POTASSIUM CHLORIDE 40 MEQ: 750 CAPSULE, EXTENDED RELEASE ORAL at 09:08

## 2018-07-12 RX ADMIN — INSULIN ASPART 4 UNITS: 100 INJECTION, SOLUTION INTRAVENOUS; SUBCUTANEOUS at 13:31

## 2018-07-12 RX ADMIN — CEFTRIAXONE SODIUM 1 G: 1 INJECTION, SOLUTION INTRAVENOUS at 23:29

## 2018-07-12 RX ADMIN — ENOXAPARIN SODIUM 40 MG: 40 INJECTION SUBCUTANEOUS at 01:02

## 2018-07-12 RX ADMIN — INSULIN DETEMIR 20 UNITS: 100 INJECTION, SOLUTION SUBCUTANEOUS at 21:48

## 2018-07-12 RX ADMIN — HYDROCODONE BITARTRATE AND ACETAMINOPHEN 2 TABLET: 7.5; 325 TABLET ORAL at 01:02

## 2018-07-12 RX ADMIN — KETOROLAC TROMETHAMINE 30 MG: 30 INJECTION, SOLUTION INTRAMUSCULAR at 11:58

## 2018-07-12 RX ADMIN — DOCUSATE SODIUM 100 MG: 100 CAPSULE, LIQUID FILLED ORAL at 21:18

## 2018-07-12 RX ADMIN — METOPROLOL TARTRATE 25 MG: 25 TABLET ORAL at 21:18

## 2018-07-12 RX ADMIN — FUROSEMIDE 20 MG: 10 INJECTION, SOLUTION INTRAMUSCULAR; INTRAVENOUS at 09:52

## 2018-07-12 RX ADMIN — KETOROLAC TROMETHAMINE 30 MG: 30 INJECTION, SOLUTION INTRAMUSCULAR at 17:45

## 2018-07-12 RX ADMIN — HYDROCODONE BITARTRATE AND ACETAMINOPHEN 2 TABLET: 7.5; 325 TABLET ORAL at 13:15

## 2018-07-12 RX ADMIN — CLINDAMYCIN PHOSPHATE 600 MG: 600 INJECTION, SOLUTION INTRAVENOUS at 09:08

## 2018-07-12 RX ADMIN — HYDROCODONE BITARTRATE AND ACETAMINOPHEN 1 TABLET: 10; 325 TABLET ORAL at 18:01

## 2018-07-12 RX ADMIN — METOPROLOL TARTRATE 25 MG: 25 TABLET ORAL at 09:08

## 2018-07-12 RX ADMIN — HYDROCODONE BITARTRATE AND ACETAMINOPHEN 1 TABLET: 10; 325 TABLET ORAL at 09:16

## 2018-07-12 RX ADMIN — HYDROMORPHONE HYDROCHLORIDE 0.5 MG: 10 INJECTION, SOLUTION INTRAMUSCULAR; INTRAVENOUS; SUBCUTANEOUS at 21:31

## 2018-07-12 RX ADMIN — INSULIN DETEMIR 20 UNITS: 100 INJECTION, SOLUTION SUBCUTANEOUS at 09:08

## 2018-07-12 RX ADMIN — HYDROCODONE BITARTRATE AND ACETAMINOPHEN 2 TABLET: 7.5; 325 TABLET ORAL at 05:13

## 2018-07-12 RX ADMIN — KETOROLAC TROMETHAMINE 30 MG: 30 INJECTION, SOLUTION INTRAMUSCULAR at 05:13

## 2018-07-12 RX ADMIN — HYDROMORPHONE HYDROCHLORIDE 0.5 MG: 10 INJECTION, SOLUTION INTRAMUSCULAR; INTRAVENOUS; SUBCUTANEOUS at 16:02

## 2018-07-12 RX ADMIN — INSULIN ASPART 4 UNITS: 100 INJECTION, SOLUTION INTRAVENOUS; SUBCUTANEOUS at 21:18

## 2018-07-12 RX ADMIN — INSULIN ASPART 6 UNITS: 100 INJECTION, SOLUTION INTRAVENOUS; SUBCUTANEOUS at 17:37

## 2018-07-12 RX ADMIN — KETOROLAC TROMETHAMINE 30 MG: 30 INJECTION, SOLUTION INTRAMUSCULAR at 23:29

## 2018-07-12 RX ADMIN — CLINDAMYCIN PHOSPHATE 600 MG: 600 INJECTION, SOLUTION INTRAVENOUS at 15:47

## 2018-07-12 NOTE — NURSING NOTE
Pt up to commode per self and help with significant other. Pt was advised to seek help from staff when out of bed this morning last night per report from night shift nurse. Will further educate about patient safety.

## 2018-07-12 NOTE — THERAPY TREATMENT NOTE
Acute Care - Physical Therapy Treatment Note  [facility]     Patient Name: Myles Toledo  : 1965  MRN: [id number]  Today's Date: 2018        Referring Physician: [place]    Admit Date: 2018    Visit Dx:    ICD-10-CM ICD-9-CM   1. Pleural effusion J90 511.9   2. Pneumonia J18.9 486   3. Decreased mobility R26.89 781.99     Patient Active Problem List   Diagnosis   • DKA (diabetic ketoacidoses) (CMS/[facility])       Therapy Treatment          Rehabilitation Treatment Summary     Row Name 18 1345             Treatment Time/Intention    Discipline physical therapist  -MG      Document Type therapy note (daily note)  -MG      Subjective Information no complaints  -MG      Patient/Family Observations supine in bed, no acute distress, 2 chest tubes  -MG      Patient Effort good  -MG      Comment coordinated pain meds with therapy session   -MG      Existing Precautions/Restrictions fall   2 chest tubes  -MG      Recorded by [MG] Megan Gosselin, PT 18 1356      Row Name 18 134             Cognitive Assessment/Intervention- PT/OT    Affect/Mental Status (Cognitive) WNL  -MG      Orientation Status (Cognition) oriented x 4  -MG      Follows Commands (Cognition) WNL  -MG      Personal Safety Interventions fall prevention program maintained;muscle strengthening facilitated;nonskid shoes/slippers when out of bed;supervised activity  -MG      Recorded by [MG] Megan Gosselin, PT 18 1356      Row Name 18 1340             Safety Issues, Functional Mobility    Impairments Affecting Function (Mobility) endurance/activity tolerance;strength  -MG      Recorded by [MG] Megan Gosselin, PT 18 1356      Row Name 18 1345             Bed Mobility Assessment/Treatment    Bed Mobility Assessment/Treatment supine-sit  -MG      Supine-Sit [place] (Bed Mobility) minimum assist (75% patient effort)  -MG      Assistive Device (Bed Mobility) bed rails;head of bed elevated  -MG       Recorded by [MG] Megan Gosselin, PT 07/12/18 1357      Row Name 07/12/18 1345             Sit-Stand Transfer    Sit-Stand [place] (Transfers) contact guard  -MG      Assistive Device (Sit-Stand Transfers) --   no AD  -MG      Recorded by [MG] Megan Gosselin, PT 07/12/18 1356      Row Name 07/12/18 1345             Stand-Sit Transfer    Stand-Sit [place] (Transfers) contact guard  -MG      Assistive Device (Stand-Sit Transfers) --   no AD  -MG      Recorded by [MG] Megan Gosselin, PT 07/12/18 1356      Row Name 07/12/18 1345             Gait/Stairs Assessment/Training    [place] Level (Gait) contact guard  -MG      Assistive Device (Gait) --   no AD  -MG      Distance in Feet (Gait) 300  -MG      Pattern (Gait) swing-through  -MG      Deviations/Abnormal Patterns (Gait) chau decreased;stride length decreased  -MG      Comment (Gait/Stairs) cues to increase gait speed.   -MG      Recorded by [MG] Megan Gosselin, PT 07/12/18 1356      Row Name 07/12/18 1345             Static Standing Balance    Level of [place] (Supported Standing, Static Balance) contact guard assist  -MG      Time Able to Maintain Position (Supported Standing, Static Balance) 3 to 4 minutes  -MG      Assistive Device Utilized (Supported Standing, Static Balance) --   HHA  -MG      Recorded by [MG] Megan Gosselin, PT 07/12/18 1356      Row Name 07/12/18 1345             Positioning and Restraints    Pre-Treatment Position in bed  -MG      Post Treatment Position bed  -MG      In Bed sitting EOB;call light within reach;encouraged to call for assist;with family/caregiver  -MG      Recorded by [MG] Megan Gosselin, PT 07/12/18 1356      Row Name 07/12/18 1345             Pain Assessment    Additional Documentation Pain Scale: Word Pre/Post-Treatment (Group)  -MG      Recorded by [MG] Megan Gosselin, PT 07/12/18 1356      Row Name 07/12/18 1345             Pain Scale: Word Pre/Post-Treatment    Pain: Word Scale, Pretreatment 2 -  mild pain  -MG      Pain: Word Scale, Post-Treatment 2 - mild pain  -MG      Pre/Post Treatment Pain Comment chest tube site  -MG      Recorded by [MG] Megan Gosselin, PT 07/12/18 1356      Row Name                Wound 07/09/18 1305 Left chest incision    Wound - Properties Group Date first assessed: 07/09/18 [MM] Time first assessed: 1305 [MM] Side: Left [MM] Location: chest [MM] Type: incision [MM] Recorded by:  [MM] Marci Lobo RN 07/09/18 1305      User Key  (r) = Recorded By, (t) = Taken By, (c) = Cosigned By    Initials Name Effective Dates Discipline    MM Marci Lobo RN 06/16/16 -  Nurse    MG Megan Gosselin, PT 04/03/18 -  PT          Wound 07/09/18 1305 Left chest incision (Active)   Dressing Appearance dried drainage 7/12/2018  2:00 AM   Closure Adhesive closure strips 7/12/2018  2:00 AM             Physical Therapy Education     Title: PT OT SLP Therapies (Active)     Topic: Physical Therapy (Active)     Point: Mobility training (Done)    Learning Progress Summary     Learner Status Readiness Method Response Comment Documented by    Patient Done Acceptance E VU,NR  MG 07/12/18 1356     Done Acceptance E VU,NR  MG 07/11/18 1158          Point: Precautions (Done)    Learning Progress Summary     Learner Status Readiness Method Response Comment Documented by    Patient Done Acceptance E VU,NR  MG 07/12/18 1356     Done Acceptance E VU,NR  MG 07/11/18 1158                      User Key     Initials Effective Dates Name Provider Type Discipline     04/03/18 -  Megan Gosselin, PT Physical Therapist PT                    PT Recommendation and Plan  Anticipated Discharge Disposition (PT): home with assist  Planned Therapy Interventions (PT Eval): balance training, bed mobility training, gait training, home exercise program, patient/family education, stair training, strengthening, transfer training  Therapy Frequency (PT Clinical Impression): daily  Outcome Summary/Treatment Plan  (PT)  Anticipated Discharge Disposition (PT): home with assist  Outcome Summary: Progressed pt to ambulation without walker. Cues to increase gait speed. No LOB. Encouraged pt to ambulate with nursing. Will progress towards goals as tolerated.           Outcome Measures     Row Name 07/12/18 1300 07/11/18 1200          How much help from another person do you currently need...    Turning from your back to your side while in flat bed without using bedrails? 4  -MG 3  -MG     Moving from lying on back to sitting on the side of a flat bed without bedrails? 4  -MG 3  -MG     Moving to and from a bed to a chair (including a wheelchair)? 3  -MG 3  -MG     Standing up from a chair using your arms (e.g., wheelchair, bedside chair)? 3  -MG 3  -MG     Climbing 3-5 steps with a railing? 3  -MG 3  -MG     To walk in hospital room? 3  -MG 3  -MG     AM-PAC 6 Clicks Score 20  -MG 18  -MG        Functional Assessment    Outcome Measure Options AM-PAC 6 Clicks Basic Mobility (PT)  -MG AM-PAC 6 Clicks Basic Mobility (PT)  -MG       User Key  (r) = Recorded By, (t) = Taken By, (c) = Cosigned By    Initials Name Provider Type    MG Megan Gosselin, PT Physical Therapist           Time Calculation:         PT Charges     Row Name 07/12/18 1357             Time Calculation    Start Time 1334  -MG      Stop Time 1343  -MG      Time Calculation (min) 9 min  -MG      PT Received On 07/12/18  -MG      PT - Next Appointment 07/13/18  -MG         Time Calculation- PT    Total Timed Code Minutes- PT 9 minute(s)  -MG        User Key  (r) = Recorded By, (t) = Taken By, (c) = Cosigned By    Initials Name Provider Type    MG Megan Gosselin, PT Physical Therapist        Therapy Suggested Charges     Code   Minutes Charges    None           Therapy Charges for Today     Code Description Service Date Service Provider Modifiers Qty    37891536621 HC PT EVAL MOD COMPLEXITY 2 7/11/2018 Megan Gosselin, PT GP 1    58578461343 HC PT THER PROC EA 15 MIN  7/11/2018 Megan Gosselin, PT GP 1    41461563641 HC PT THER PROC EA 15 MIN 7/12/2018 Megan Gosselin, PT GP 1          PT G-Codes  Outcome Measure Options: AM-PAC 6 Clicks Basic Mobility (PT)    Megan Gosselin, PT  7/12/2018

## 2018-07-12 NOTE — PROGRESS NOTES
"    Chief Complaint: follow-up trapped lung, parapneumonic effusion  S/P: Left video assisted thoracoscopy with decortication  POD # 3    Subjective:  Symptoms:  Improved.  He reports chest pain and weakness.  No shortness of breath.    Diet:  Adequate intake.  No nausea or vomiting.    Activity level: Returning to normal.    Pain:  He complains of pain that is moderate.  He reports pain is unchanged.  Pain is partially controlled.        Vital Signs:  Temp:  [98.4 °F (36.9 °C)-98.9 °F (37.2 °C)] 98.4 °F (36.9 °C)  Heart Rate:  [86-95] 95  Resp:  [18] 18  BP: (127-141)/(78-94) 139/83    Intake & Output (last day)       07/11 0701 - 07/12 0700 07/12 0701 - 07/13 0700    P.O. 720     IV Piggyback 50     Total Intake(mL/kg) 770 (7.2)     Urine (mL/kg/hr) 1275 (0.5)     Stool      Chest Tube 41 (0)     Total Output 1316      Net -546                  Objective:  General Appearance:  Comfortable, well-appearing, in no acute distress and not in pain.    Vital signs: (most recent): Blood pressure 138/86, pulse 86, temperature 99.1 °F (37.3 °C), temperature source Oral, resp. rate 18, height 172.7 cm (68\"), weight 107 kg (235 lb 0.2 oz), SpO2 92 %.  Vital signs are normal.  No fever.    Output: Producing urine and producing stool.    HEENT: Normal HEENT exam.    Lungs:  Normal effort and normal respiratory rate.  He is not in respiratory distress.  There are decreased breath sounds.  (Diminished breath sounds on left)  Heart: Tachycardia.  Regular rhythm.  S1 normal and S2 normal.  No murmur.   Chest: Chest wall tenderness present.    Abdomen: Abdomen is soft and non-distended.  Bowel sounds are normal.   There is no abdominal tenderness.   There is no mass.   Extremities: Normal range of motion.  There is no dependent edema.    Pulses: Distal pulses are intact.    Neurological: Patient is alert and oriented to person, place and time.  Normal strength.    Pupils:  Pupils are equal, round, and reactive to light.    Skin:  " Warm and dry.          Chest tube:   Site: Left, Clean, Dry, Intact and Securement device intact  Suction: -20 cm  Air Leak: negative  24 Hour Total: 7/26/8    Results Review:     I reviewed the patient's new clinical results.  I reviewed the patient's new imaging results and agree with the interpretation.  Discussed with patient, RN and Dr. Saldaña.    Imaging Results (last 24 hours)     Procedure Component Value Units Date/Time    XR Chest 1 View [[id number]] Collected:  07/12/18 0558     Updated:  07/12/18 0558    Narrative:       X-RAY CHEST 1 VIEW.     HISTORY: Chest tube management.     COMPARISON: 7/11/2018.     FINDINGS:  Cardiomegaly, left chest tubes are unchanged. Increasing pulmonary  congestion. Small left effusion.         Lung volumes are low.              Impression:       Worsening pulmonary congestion compared to prior study. Small left  effusion remains. Chest tubes are stable.           XR Chest 1 View [[id number]] Collected:  07/11/18 0818     Updated:  07/11/18 0925    Narrative:       CLINICAL HISTORY: 52-year-old male 2 days postop left video-assisted  thoracoscopy with decortication. Follow-up with left chest tubes.     PORTABLE AP SEMIERECT CHEST DATED 07/11/2018 AT 0556 HOURS     FINDINGS: When compared to the most recent available prior chest  radiograph dated 07/10/2018 at 0652 hours, the 3 left chest tubes  remain. A 2 mm left apical pleural separation remains. There may be a  trace of medial left lower chest pleural separation. No large  pneumothorax is seen on the current exam. Fairly extensive patchy  opacity throughout the left lung with some dense opacity in the basilar  left lung remain. Thickening of lateral pleural stripe at the lateral to  lower lateral left chest remains. Modest hazy to patchy opacity in the  right lung persists. Lung volumes are similar to minimally increased on  the current exam. Borderline to mild cardiomegaly remains.     CONCLUSION: Slightly increased lung  volumes with similar patchy to dense  opacities in the left chest and persistent hazy to patchy opacity in the  perihilar and basilar right chest. The 3 left chest tubes remain, and  there is again a small left apical pneumothorax.     This report was finalized on 7/11/2018 9:21 AM by Dr. Franko Zapien M.D.             Lab Results:     Lab Results (last 24 hours)     Procedure Component Value Units Date/Time    Phosphorus [[id number]]  (Abnormal) Collected:  07/12/18 0637    Specimen:  Blood Updated:  07/12/18 0745     Phosphorus 4.8 (H) mg/dL     Comprehensive Metabolic Panel [[id number]]  (Abnormal) Collected:  07/12/18 0637    Specimen:  Blood Updated:  07/12/18 0743     Glucose 100 (H) mg/dL      BUN 10 mg/dL      Creatinine 0.46 (L) mg/dL      Sodium 134 (L) mmol/L      Potassium 3.4 (L) mmol/L      Chloride 96 (L) mmol/L      CO2 27.4 mmol/L      Calcium 8.9 mg/dL      Total Protein 6.8 g/dL      Albumin 2.50 (L) g/dL      ALT (SGPT) 6 U/L      AST (SGOT) 5 U/L      Alkaline Phosphatase 109 U/L      Total Bilirubin 0.3 mg/dL      eGFR Non African Amer >150 mL/min/1.73      Globulin 4.3 gm/dL      A/G Ratio 0.6 g/dL      BUN/Creatinine Ratio 21.7     Anion Gap 10.6 mmol/L     Magnesium [[id number]]  (Normal) Collected:  07/12/18 0637    Specimen:  Blood Updated:  07/12/18 0735     Magnesium 2.1 mg/dL     CBC (No Diff) [[id number]]  (Abnormal) Collected:  07/12/18 0637    Specimen:  Blood Updated:  07/12/18 0716     WBC 13.40 (H) 10*3/mm3      RBC 4.01 (L) 10*6/mm3      Hemoglobin 11.0 (L) g/dL      Hematocrit 33.7 (L) %      MCV 84.0 fL      MCH 27.4 pg      MCHC 32.6 g/dL      RDW 13.3 %      RDW-SD 40.4 fl      MPV 9.2 fL      Platelets 421 10*3/mm3     Tissue / Bone Culture - Tissue, Pleural Cavity [[id number]]  (Normal) Collected:  07/09/18 1204    Specimen:  Tissue from Pleural Cavity Updated:  07/12/18 0651     Tissue Culture No growth     Gram Stain Result Rare (1+) WBCs seen      No organisms seen    Body  Fluid Culture - Body Fluid, Pleural Cavity [[id number]]  (Normal) Collected:  07/09/18 1153    Specimen:  Body Fluid from Pleural Cavity Updated:  07/12/18 0635     BF Culture No growth     Gram Stain Result Rare (1+) WBCs seen      No organisms seen    POC Glucose Once [[id number]]  (Normal) Collected:  07/12/18 0633    Specimen:  Blood Updated:  07/12/18 0635     Glucose 96 mg/dL     Narrative:       Meter: [id number] : [id number] Andersoncarrillo Acosta LESLEE    POC Glucose Once [[id number]]  (Abnormal) Collected:  07/11/18 2035    Specimen:  Blood Updated:  07/11/18 2037     Glucose 215 (H) mg/dL     Narrative:       Meter: [id number] : [id number] Kirk Kathy CRISTOBAL CAMILO    POC Glucose Once [[id number]]  (Abnormal) Collected:  07/11/18 1606    Specimen:  Blood Updated:  07/11/18 1607     Glucose 256 (H) mg/dL     Narrative:       Meter: [id number] : [id number] Jhonathan CAMILO    POC Glucose Once [[id number]]  (Abnormal) Collected:  07/11/18 1155    Specimen:  Blood Updated:  07/11/18 1201     Glucose 222 (H) mg/dL     Narrative:       Meter: [id number] : [id number] Jhonathan CAMILO           Assessment/Plan     Active Problems:    DKA (diabetic ketoacidoses) (CMS/[facility])       Assessment & Plan    1. Paraneumonic effusion with trapped lung: S/P Left VAT with decortication; patient is stable post-op.  Removed CT#1, will leave the remaining two chest tubes to waterseal today. This morning's x-ray shows some pulmonary congestions. I've ordered 20mg of IV Lasix once. Will follow-up in AM with another chest x-ray. Hopefully, we will be able to remove the second chest tube tomorrow. Encouraged good pulmonary hygiene, incentive spirometry.      2. Pneumonia: Antibiotic and respiratory therapy per pulmonary service.     3. Diabetic ketoacidosis: Resolved. Patient states he is needle phobic and hasn't been taking his insulin for this reason. Diabetes educator has seen him and he found it helpful. They are helping him with  discharge planning for DM management.     4. Pain control: Scheduled Toradol. PRN Medications ordered. Transition to PO vs IV pain medication. Patient does not wish to take neurontin due to previous adverse event - although he denies allergy, states he does not tolerate and it makes him very drowsy.     Patient ambulated with physical therapy yesterday.  I have encouraged him to work with PT again today and to increase ambulation and have meals up in chair.    RUBENS Spence  [facility]  07/12/18  8:58 AM    Patient is recovering well from surgery.  Plan chest tube removal tomorrow.

## 2018-07-12 NOTE — PLAN OF CARE
Problem: Patient Care Overview  Goal: Plan of Care Review  Outcome: Ongoing (interventions implemented as appropriate)   07/11/18 1412 07/12/18 0250   Coping/Psychosocial   Plan of Care Reviewed With patient --    OTHER   Outcome Summary --  VSS, pain controlled via MAR. CTs on -20 sxn, pt remains on RA. No c/o distress. Rested through night, will continue to monitor.   Plan of Care Review   Progress improving --        Problem: Skin Injury Risk (Adult)  Goal: Identify Related Risk Factors and Signs and Symptoms  Outcome: Ongoing (interventions implemented as appropriate)      Problem: Sepsis/Septic Shock (Adult)  Goal: Signs and Symptoms of Listed Potential Problems Will be Absent, Minimized or Managed (Sepsis/Septic Shock)  Outcome: Ongoing (interventions implemented as appropriate)      Problem: Diabetes, Type 2 (Adult)  Goal: Signs and Symptoms of Listed Potential Problems Will be Absent, Minimized or Managed (Diabetes, Type 2)  Outcome: Ongoing (interventions implemented as appropriate)      Problem: Fall Risk (Adult)  Goal: Identify Related Risk Factors and Signs and Symptoms  Outcome: Ongoing (interventions implemented as appropriate)    Goal: Absence of Fall  Outcome: Ongoing (interventions implemented as appropriate)      Problem: Pain, Acute (Adult)  Goal: Identify Related Risk Factors and Signs and Symptoms  Outcome: Ongoing (interventions implemented as appropriate)    Goal: Acceptable Pain Control/Comfort Level  Outcome: Ongoing (interventions implemented as appropriate)

## 2018-07-12 NOTE — PLAN OF CARE
Problem: Patient Care Overview  Goal: Plan of Care Review   07/12/18 2367   OTHER   Outcome Summary Progressed pt to ambulation without walker. Cues to increase gait speed. No LOB. Encouraged pt to ambulate with nursing. Will progress towards goals as tolerated.

## 2018-07-12 NOTE — PROGRESS NOTES
[facility]  Phone: [phone]  Arnuflo Garcia MD    Subjective:  LOS: 5    Pain under control. No SOA.    Objective   Vital Signs past 24hrs    Temp range: Temp (24hrs), Av.5 °F (36.9 °C), Min:98.1 °F (36.7 °C), Max:98.9 °F (37.2 °C)    BP range: BP: (125-139)/(82-88) 125/85  Pulse range: Heart Rate:  [86-95] 88  Resp rate range: Resp:  [16-18] 18    Device (Oxygen Therapy): room air   Oxygen range:SpO2:  [92 %] 92 %      107 kg (235 lb 0.2 oz); Body mass index is 35.73 kg/m².    Intake/Output Summary (Last 24 hours) at 18 1501  Last data filed at 18 1300   Gross per 24 hour   Intake             1060 ml   Output             1169 ml   Net             -109 ml       Physical Exam   Cardiovascular: Normal rate and regular rhythm.    No murmur heard.  Pulmonary/Chest: He has no wheezes. He has rales (few) in the left lower field.   Left chest tube to suction   Abdominal: Soft. Bowel sounds are normal. There is no tenderness.   Musculoskeletal: He exhibits no edema.   Neurological: He is alert.   Nursing note and vitals reviewed.    Results Review:    I have reviewed the laboratory and imaging data since the last note by [facility] physician.  My annotations are noted in assessment and plan.    Medication Review:  I have reviewed the current MAR.  My annotations are noted in assessment and plan.      lactated ringers 9 mL/hr    sodium chloride 9 mL/hr Last Rate: 9 mL/hr (18 0532)     Plan   PCCM Problems  Left empyema, s/p vats  LLL pneumonia  Sepsis resolved  Relevant Medical Diagnoses  DKA, resolved  Poorly controlled DM  HTN    Plan of Treatment  WBC better on Clindamycin. He states allergy to penicillin as child but tolerated Amoxicillin as adult.  Can consider switching to po Augmentin etc.    Now on Insulin - needs NPH before discharge (cheaper) and major education.    Consider basilio for ANIYA as outpatient.    On potassium protocol, check tomorrow.    Arnulfo Garcia MD  18  3:01  PM    Part of this note may be an electronic transcription/translation of spoken language to printed text using the Dragon Dictation System.

## 2018-07-13 ENCOUNTER — APPOINTMENT (OUTPATIENT)
Dept: GENERAL RADIOLOGY | Facility: HOSPITAL | Age: 53
End: 2018-07-13

## 2018-07-13 LAB
ANION GAP SERPL CALCULATED.3IONS-SCNC: 13.6 MMOL/L
BUN BLD-MCNC: 10 MG/DL (ref 6–20)
BUN/CREAT SERPL: 20.4 (ref 7–25)
CALCIUM SPEC-SCNC: 8.9 MG/DL (ref 8.6–10.5)
CHLORIDE SERPL-SCNC: 94 MMOL/L (ref 98–107)
CO2 SERPL-SCNC: 27.4 MMOL/L (ref 22–29)
CREAT BLD-MCNC: 0.49 MG/DL (ref 0.76–1.27)
DEPRECATED RDW RBC AUTO: 39.8 FL (ref 37–54)
ERYTHROCYTE [DISTWIDTH] IN BLOOD BY AUTOMATED COUNT: 13.1 % (ref 11.5–14.5)
GFR SERPL CREATININE-BSD FRML MDRD: >150 ML/MIN/1.73
GLUCOSE BLD-MCNC: 169 MG/DL (ref 65–99)
GLUCOSE BLDC GLUCOMTR-MCNC: 160 MG/DL (ref 70–130)
GLUCOSE BLDC GLUCOMTR-MCNC: 178 MG/DL (ref 70–130)
GLUCOSE BLDC GLUCOMTR-MCNC: 244 MG/DL (ref 70–130)
GLUCOSE BLDC GLUCOMTR-MCNC: 282 MG/DL (ref 70–130)
HCT VFR BLD AUTO: 36.5 % (ref 40.4–52.2)
HGB BLD-MCNC: 12 G/DL (ref 13.7–17.6)
MAGNESIUM SERPL-MCNC: 2 MG/DL (ref 1.6–2.6)
MCH RBC QN AUTO: 27.7 PG (ref 27–32.7)
MCHC RBC AUTO-ENTMCNC: 32.9 G/DL (ref 32.6–36.4)
MCV RBC AUTO: 84.3 FL (ref 79.8–96.2)
PHOSPHATE SERPL-MCNC: 4.5 MG/DL (ref 2.5–4.5)
PLATELET # BLD AUTO: 495 10*3/MM3 (ref 140–500)
PMV BLD AUTO: 9.3 FL (ref 6–12)
POTASSIUM BLD-SCNC: 4.5 MMOL/L (ref 3.5–5.2)
RBC # BLD AUTO: 4.33 10*6/MM3 (ref 4.6–6)
SODIUM BLD-SCNC: 135 MMOL/L (ref 136–145)
WBC NRBC COR # BLD: 12.86 10*3/MM3 (ref 4.5–10.7)

## 2018-07-13 PROCEDURE — 83735 ASSAY OF MAGNESIUM: CPT | Performed by: INTERNAL MEDICINE

## 2018-07-13 PROCEDURE — 80048 BASIC METABOLIC PNL TOTAL CA: CPT | Performed by: INTERNAL MEDICINE

## 2018-07-13 PROCEDURE — 71045 X-RAY EXAM CHEST 1 VIEW: CPT

## 2018-07-13 PROCEDURE — 85027 COMPLETE CBC AUTOMATED: CPT | Performed by: INTERNAL MEDICINE

## 2018-07-13 PROCEDURE — 97110 THERAPEUTIC EXERCISES: CPT

## 2018-07-13 PROCEDURE — 94760 N-INVAS EAR/PLS OXIMETRY 1: CPT

## 2018-07-13 PROCEDURE — 63710000001 INSULIN ASPART PER 5 UNITS: Performed by: INTERNAL MEDICINE

## 2018-07-13 PROCEDURE — 63710000001 INSULIN DETEMIR PER 5 UNITS: Performed by: INTERNAL MEDICINE

## 2018-07-13 PROCEDURE — 25010000002 KETOROLAC TROMETHAMINE PER 15 MG: Performed by: THORACIC SURGERY (CARDIOTHORACIC VASCULAR SURGERY)

## 2018-07-13 PROCEDURE — 99024 POSTOP FOLLOW-UP VISIT: CPT | Performed by: NURSE PRACTITIONER

## 2018-07-13 PROCEDURE — 94799 UNLISTED PULMONARY SVC/PX: CPT

## 2018-07-13 PROCEDURE — 25010000002 ENOXAPARIN PER 10 MG: Performed by: INTERNAL MEDICINE

## 2018-07-13 PROCEDURE — 25010000002 HYDROMORPHONE PER 4 MG: Performed by: INTERNAL MEDICINE

## 2018-07-13 PROCEDURE — 63710000001 INSULIN ISOPHANE HUMAN PER 5 UNITS: Performed by: INTERNAL MEDICINE

## 2018-07-13 PROCEDURE — 82962 GLUCOSE BLOOD TEST: CPT

## 2018-07-13 PROCEDURE — 84100 ASSAY OF PHOSPHORUS: CPT | Performed by: INTERNAL MEDICINE

## 2018-07-13 RX ORDER — HYDROMORPHONE HYDROCHLORIDE 1 MG/ML
0.5 INJECTION, SOLUTION INTRAMUSCULAR; INTRAVENOUS; SUBCUTANEOUS
Status: DISCONTINUED | OUTPATIENT
Start: 2018-07-13 | End: 2018-07-15 | Stop reason: HOSPADM

## 2018-07-13 RX ORDER — AMOXICILLIN AND CLAVULANATE POTASSIUM 875; 125 MG/1; MG/1
1 TABLET, FILM COATED ORAL EVERY 12 HOURS SCHEDULED
Status: DISCONTINUED | OUTPATIENT
Start: 2018-07-14 | End: 2018-07-15 | Stop reason: HOSPADM

## 2018-07-13 RX ORDER — AMOXICILLIN AND CLAVULANATE POTASSIUM 875; 125 MG/1; MG/1
1 TABLET, FILM COATED ORAL EVERY 12 HOURS SCHEDULED
Qty: 28 TABLET | Refills: 0 | Status: SHIPPED | OUTPATIENT
Start: 2018-07-14 | End: 2018-07-28

## 2018-07-13 RX ADMIN — METOPROLOL TARTRATE 25 MG: 25 TABLET ORAL at 21:12

## 2018-07-13 RX ADMIN — HYDROMORPHONE HYDROCHLORIDE 0.5 MG: 1 INJECTION, SOLUTION INTRAMUSCULAR; INTRAVENOUS; SUBCUTANEOUS at 21:12

## 2018-07-13 RX ADMIN — INSULIN ASPART 2 UNITS: 100 INJECTION, SOLUTION INTRAVENOUS; SUBCUTANEOUS at 13:03

## 2018-07-13 RX ADMIN — CLINDAMYCIN PHOSPHATE 600 MG: 600 INJECTION, SOLUTION INTRAVENOUS at 16:22

## 2018-07-13 RX ADMIN — CLINDAMYCIN PHOSPHATE 600 MG: 600 INJECTION, SOLUTION INTRAVENOUS at 08:31

## 2018-07-13 RX ADMIN — HYDROCODONE BITARTRATE AND ACETAMINOPHEN 1 TABLET: 10; 325 TABLET ORAL at 08:35

## 2018-07-13 RX ADMIN — INSULIN ASPART 2 UNITS: 100 INJECTION, SOLUTION INTRAVENOUS; SUBCUTANEOUS at 08:30

## 2018-07-13 RX ADMIN — ENOXAPARIN SODIUM 40 MG: 40 INJECTION SUBCUTANEOUS at 00:31

## 2018-07-13 RX ADMIN — DOCUSATE SODIUM 100 MG: 100 CAPSULE, LIQUID FILLED ORAL at 08:31

## 2018-07-13 RX ADMIN — INSULIN ASPART 4 UNITS: 100 INJECTION, SOLUTION INTRAVENOUS; SUBCUTANEOUS at 21:13

## 2018-07-13 RX ADMIN — CLINDAMYCIN PHOSPHATE 600 MG: 600 INJECTION, SOLUTION INTRAVENOUS at 00:31

## 2018-07-13 RX ADMIN — KETOROLAC TROMETHAMINE 30 MG: 30 INJECTION, SOLUTION INTRAMUSCULAR at 13:03

## 2018-07-13 RX ADMIN — HYDROMORPHONE HYDROCHLORIDE 0.5 MG: 10 INJECTION, SOLUTION INTRAMUSCULAR; INTRAVENOUS; SUBCUTANEOUS at 10:47

## 2018-07-13 RX ADMIN — HYDROCODONE BITARTRATE AND ACETAMINOPHEN 1 TABLET: 10; 325 TABLET ORAL at 16:22

## 2018-07-13 RX ADMIN — ENOXAPARIN SODIUM 40 MG: 40 INJECTION SUBCUTANEOUS at 23:23

## 2018-07-13 RX ADMIN — INSULIN ASPART 6 UNITS: 100 INJECTION, SOLUTION INTRAVENOUS; SUBCUTANEOUS at 18:02

## 2018-07-13 RX ADMIN — INSULIN DETEMIR 20 UNITS: 100 INJECTION, SOLUTION SUBCUTANEOUS at 08:39

## 2018-07-13 RX ADMIN — HUMAN INSULIN 20 UNITS: 100 INJECTION, SUSPENSION SUBCUTANEOUS at 18:03

## 2018-07-13 RX ADMIN — HYDROMORPHONE HYDROCHLORIDE 0.5 MG: 10 INJECTION, SOLUTION INTRAMUSCULAR; INTRAVENOUS; SUBCUTANEOUS at 04:46

## 2018-07-13 RX ADMIN — HYDROCODONE BITARTRATE AND ACETAMINOPHEN 2 TABLET: 7.5; 325 TABLET ORAL at 21:13

## 2018-07-13 RX ADMIN — HYDROMORPHONE HYDROCHLORIDE 0.5 MG: 1 INJECTION, SOLUTION INTRAMUSCULAR; INTRAVENOUS; SUBCUTANEOUS at 18:26

## 2018-07-13 RX ADMIN — HYDROMORPHONE HYDROCHLORIDE 0.5 MG: 1 INJECTION, SOLUTION INTRAMUSCULAR; INTRAVENOUS; SUBCUTANEOUS at 23:24

## 2018-07-13 RX ADMIN — METOPROLOL TARTRATE 25 MG: 25 TABLET ORAL at 08:31

## 2018-07-13 RX ADMIN — HYDROCODONE BITARTRATE AND ACETAMINOPHEN 2 TABLET: 7.5; 325 TABLET ORAL at 00:31

## 2018-07-13 RX ADMIN — KETOROLAC TROMETHAMINE 30 MG: 30 INJECTION, SOLUTION INTRAMUSCULAR at 06:07

## 2018-07-13 NOTE — PROGRESS NOTES
[facility]  Phone: [phone]  Arnulfo Garcia MD    Subjective:  LOS: 6    He is feeling better. Now 2 of the chest tubes are out.     Objective   Vital Signs past 24hrs    Temp range: Temp (24hrs), Av.7 °F (37.1 °C), Min:98.3 °F (36.8 °C), Max:99.1 °F (37.3 °C)    BP range: BP: (120-148)/(78-87) 123/78  Pulse range: Heart Rate:  [83-93] 93  Resp rate range: Resp:  [18-20] 20    Device (Oxygen Therapy): room air   Oxygen range:SpO2:  [93 %-96 %] 95 %      107 kg (235 lb 0.2 oz); Body mass index is 35.73 kg/m².    Intake/Output Summary (Last 24 hours) at 18 1450  Last data filed at 18 1125   Gross per 24 hour   Intake              650 ml   Output             3258 ml   Net            -2608 ml       Physical Exam   Cardiovascular: Normal rate and regular rhythm.    No murmur heard.  Pulmonary/Chest: He has no wheezes. He has rales (few) in the left lower field.   Left chest tube to suction   Abdominal: Soft. Bowel sounds are normal. There is no tenderness.   Musculoskeletal: He exhibits no edema.   Neurological: He is alert.   Nursing note and vitals reviewed.    Results Review:    I have reviewed the laboratory and imaging data since the last note by [facility] physician.  My annotations are noted in assessment and plan.    Medication Review:  I have reviewed the current MAR.  My annotations are noted in assessment and plan.      lactated ringers 9 mL/hr    sodium chloride 9 mL/hr Last Rate: 9 mL/hr (18 0532)     Plan   PCCM Problems  Left empyema, s/p vats  LLL pneumonia  Sepsis resolved  Relevant Medical Diagnoses  DKA, resolved  Poorly controlled DM  HTN    Plan of Treatment  WBC better on Clindamycin. He states allergy to penicillin as child but tolerated Amoxicillin as adult.  Switch to Augmentin for another 14 days.    Now on Insulin - needs NPH before discharge (cheaper) and major education.    Consider basilio for ANIYA as outpatient.    K is better.    Needs fu imaging in 6-8 weeks.  Patient advised.    Due to cost issues scripts for home were prescribed today in case patient leaves over weekend.    Arnulfo Garcia MD  07/13/18  2:50 PM    Part of this note may be an electronic transcription/translation of spoken language to printed text using the Dragon Dictation System.

## 2018-07-13 NOTE — PLAN OF CARE
Problem: Patient Care Overview  Goal: Plan of Care Review  Outcome: Ongoing (interventions implemented as appropriate)   07/13/18 1543   Coping/Psychosocial   Plan of Care Reviewed With patient   OTHER   Outcome Summary vss, ct #2 removed, ct #3 remains, cxr in am plans to discontinue ct #3 if cxr remains stable. pain controlled with oral narcotics.    Plan of Care Review   Progress improving       Problem: Skin Injury Risk (Adult)  Goal: Identify Related Risk Factors and Signs and Symptoms  Outcome: Ongoing (interventions implemented as appropriate)   07/13/18 1543   Skin Injury Risk (Adult)   Related Risk Factors (Skin Injury Risk) mobility impaired

## 2018-07-13 NOTE — THERAPY TREATMENT NOTE
Acute Care - Physical Therapy Treatment Note  [facility]     Patient Name: Myles Toledo  : 1965  MRN: [id number]  Today's Date: 2018        Referring Physician: [place]    Admit Date: 2018    Visit Dx:    ICD-10-CM ICD-9-CM   1. Pleural effusion J90 511.9   2. Pneumonia J18.9 486   3. Decreased mobility R26.89 781.99     Patient Active Problem List   Diagnosis   • DKA (diabetic ketoacidoses) (CMS/[facility])       Therapy Treatment          Rehabilitation Treatment Summary     Row Name 18 1010             Treatment Time/Intention    Discipline physical therapist  -MG      Document Type therapy note (daily note)  -MG      Subjective Information complains of;fatigue   did not sleep last night   -MG      Patient/Family Observations supine in bed, pt reports increased pain last night therefore did not sleep, 2 chest tubes  -MG      Patient Effort good  -MG      Comment coordinated pain meds with therapy   -MG      Existing Precautions/Restrictions fall   2 chest tubes, IV  -MG      Recorded by [MG] Megan Gosselin, PT 18 1023      Row Name 18 1010             Cognitive Assessment/Intervention- PT/OT    Affect/Mental Status (Cognitive) WNL  -MG      Orientation Status (Cognition) oriented x 4  -MG      Follows Commands (Cognition) WNL  -MG      Personal Safety Interventions fall prevention program maintained;muscle strengthening facilitated;supervised activity;nonskid shoes/slippers when out of bed  -MG      Recorded by [MG] Megan Gosselin, PT 18 1023      Row Name 18 1010             Safety Issues, Functional Mobility    Impairments Affecting Function (Mobility) endurance/activity tolerance;strength  -MG      Recorded by [MG] Megan Gosselin, PT 18 1023      Row Name 18 1010             Bed Mobility Assessment/Treatment    Supine-Sit [place] (Bed Mobility) minimum assist (75% patient effort)  -MG      Assistive Device (Bed Mobility) bed rails;head of bed  elevated  -MG      Recorded by [MG] Megan Gosselin, PT 07/13/18 1023      Row Name 07/13/18 1010             Sit-Stand Transfer    Sit-Stand [place] (Transfers) supervision  -MG      Assistive Device (Sit-Stand Transfers) --   no AD  -MG      Recorded by [MG] Megan Gosselin, PT 07/13/18 1023      Row Name 07/13/18 1010             Stand-Sit Transfer    Stand-Sit [place] (Transfers) supervision  -MG      Assistive Device (Stand-Sit Transfers) --   no AD  -MG      Recorded by [MG] Megan Gosselin, PT 07/13/18 1023      Row Name 07/13/18 1010             Gait/Stairs Assessment/Training    [place] Level (Gait) contact guard;supervision  -MG      Assistive Device (Gait) --   no AD  -MG      Distance in Feet (Gait) 150  -MG      Pattern (Gait) swing-through  -MG      Deviations/Abnormal Patterns (Gait) chau decreased;stride length decreased  -MG      Comment (Gait/Stairs) initially requires CGA due to unsteadiness. Progresses to SBA  -MG      Recorded by [MG] Megan Gosselin, PT 07/13/18 1023      Row Name 07/13/18 1010             Positioning and Restraints    Pre-Treatment Position in bed  -MG      Post Treatment Position bed  -MG      In Bed sitting EOB;call light within reach;encouraged to call for assist;with family/caregiver  -MG      Recorded by [MG] Megan Gosselin, PT 07/13/18 1023      Row Name 07/13/18 1010             Pain Scale: Word Pre/Post-Treatment    Pain: Word Scale, Pretreatment 2 - mild pain  -MG      Pain: Word Scale, Post-Treatment 2 - mild pain  -MG      Pre/Post Treatment Pain Comment chest tube site  -MG      Recorded by [MG] Megan Gosselin, PT 07/13/18 1023      Row Name                Wound 07/09/18 1305 Left chest incision    Wound - Properties Group Date first assessed: 07/09/18 [MM] Time first assessed: 1305 [MM] Side: Left [MM] Location: chest [MM] Type: incision [MM] Recorded by:  [MM] Marci Lobo RN 07/09/18 1305      User Key  (r) = Recorded By, (t) = Taken By, (c)  = Cosigned By    Initials Name Effective Dates Discipline    MM Marci Lobo, RN 06/16/16 -  Nurse    MG Megan Gosselin, PT 04/03/18 -  PT          Wound 07/09/18 1305 Left chest incision (Active)   Dressing Appearance dry;intact 7/13/2018  8:38 AM   Drainage Amount none 7/13/2018  8:38 AM   Wound Output (mL) 0 7/12/2018  8:00 PM             Physical Therapy Education     Title: PT OT SLP Therapies (Active)     Topic: Physical Therapy (Active)     Point: Mobility training (Done)    Learning Progress Summary     Learner Status Readiness Method Response Comment Documented by    Patient Done Acceptance E VU,NR  MG 07/13/18 1023     Done Acceptance E VU,NR  MG 07/12/18 1356     Done Acceptance E VU,NR  MG 07/11/18 1158          Point: Body mechanics (Done)    Learning Progress Summary     Learner Status Readiness Method Response Comment Documented by    Patient Done Acceptance E VU,NR  MG 07/13/18 1023          Point: Precautions (Done)    Learning Progress Summary     Learner Status Readiness Method Response Comment Documented by    Patient Done Acceptance E VU,NR  MG 07/13/18 1023     Done Acceptance E VU,NR  MG 07/12/18 1356     Done Acceptance E VU,NR  MG 07/11/18 1158                      User Key     Initials Effective Dates Name Provider Type Discipline     04/03/18 -  Megan Gosselin, PT Physical Therapist PT                    PT Recommendation and Plan  Anticipated Discharge Disposition (PT): home with assist  Planned Therapy Interventions (PT Eval): balance training, bed mobility training, gait training, home exercise program, patient/family education, stair training, strengthening, transfer training  Therapy Frequency (PT Clinical Impression): daily  Outcome Summary/Treatment Plan (PT)  Anticipated Discharge Disposition (PT): home with assist  Outcome Summary: Continues to require assist with bed mobility due to pain. Once out of bed, SBA-CGA required. Very fatigued this date due to not sleeping last  night. Continued to encourage pt to ambulate with nursing to continue to improve mobility. Will continue to progress towards goals.           Outcome Measures     Row Name 07/13/18 1000 07/12/18 1300 07/11/18 1200       How much help from another person do you currently need...    Turning from your back to your side while in flat bed without using bedrails? 4  -MG 4  -MG 3  -MG    Moving from lying on back to sitting on the side of a flat bed without bedrails? 3  -MG 4  -MG 3  -MG    Moving to and from a bed to a chair (including a wheelchair)? 3  -MG 3  -MG 3  -MG    Standing up from a chair using your arms (e.g., wheelchair, bedside chair)? 3  -MG 3  -MG 3  -MG    Climbing 3-5 steps with a railing? 3  -MG 3  -MG 3  -MG    To walk in hospital room? 3  -MG 3  -MG 3  -MG    AM-PAC 6 Clicks Score 19  -MG 20  -MG 18  -MG       Functional Assessment    Outcome Measure Options AM-PAC 6 Clicks Basic Mobility (PT)  -MG AM-PAC 6 Clicks Basic Mobility (PT)  -MG AM-PAC 6 Clicks Basic Mobility (PT)  -MG      User Key  (r) = Recorded By, (t) = Taken By, (c) = Cosigned By    Initials Name Provider Type    MG Megan Gosselin, PT Physical Therapist           Time Calculation:         PT Charges     Row Name 07/13/18 1024             Time Calculation    Start Time 1001  -MG      Stop Time 1009  -MG      Time Calculation (min) 8 min  -MG      PT Received On 07/13/18  -MG      PT - Next Appointment 07/14/18  -MG         Time Calculation- PT    Total Timed Code Minutes- PT 8 minute(s)  -MG        User Key  (r) = Recorded By, (t) = Taken By, (c) = Cosigned By    Initials Name Provider Type    MG Megan Gosselin, PT Physical Therapist        Therapy Suggested Charges     Code   Minutes Charges    None           Therapy Charges for Today     Code Description Service Date Service Provider Modifiers Qty    70200845134 HC PT THER PROC EA 15 MIN 7/12/2018 Megan Gosselin, PT GP 1    08151012817 HC PT THER PROC EA 15 MIN 7/13/2018 Alyce  Gosselin, PT GP 1          PT G-Codes  Outcome Measure Options: AM-PAC 6 Clicks Basic Mobility (PT)    Megan Gosselin, PT  7/13/2018

## 2018-07-13 NOTE — NURSING NOTE
"Diabetes Education  Assessment/Teaching    Patient Name:  Myles Toledo  YOB: 1965  MRN: [id number]  Admit Date:  7/7/2018      Assessment Date:  7/10/18    Most Recent Value   General Information    Referral From:  MD order   Height  172.7 cm (68\")   Height Method  Stated   Weight  107 kg (235 lb 0.2 oz)   Diabetes History   What type of diabetes do you have?  Type 2   Do you test your blood sugar at home?  no   Have you had high blood sugar? (>140mg/dl)  yes   Education Preferences   Barriers to Learning  financial stress/problems [cultural. e.g pt dc'd BG checks in the past. ]   Assessment Topics   Taking Medication - Assessment  Needs education [anticipate pt to dc with NPH 20 units bid per current orders]   Problem Solving - Assessment  Needs education [Review: hypoglycemia/tx. ]   Reducing Risk - Assessment  Needs education   Healthy Coping - Assessment  Competent [supportive significant other at bedside. ]         Most Recent Value   DM Education Needs   Meter  Meter provided   Meter Type  Other - free NovaMax Plus BG sample meter and kit. Pt declined meter instruction or practice stating he has used BG meter before.    Medication  Insulin, Vial -advise pt of NPH dosing/scheduling. pt declined review of how to draw up insulin from a vial.   Problem Solving  Hypoglycemia, Treatment   Reducing Risks  Cardiovascular   Healthy Coping  Appropriate   Teaching Method  Explanation, Handouts   Patient Response  Verbalized understanding            Other Comments:  Insulins/syringes were provided thru [facility].  D/w bedside RN unopened insulins should be stored in refrigeration. pls be sure pt gets insulins from refrigeration for dc. Thank you.         Electronically signed by:  Naila Marcus, RN, BSN, CDE  07/13/18 5:57 PM  "

## 2018-07-13 NOTE — PLAN OF CARE
Problem: Patient Care Overview  Goal: Plan of Care Review   07/13/18 1023   OTHER   Outcome Summary Continues to require assist with bed mobility due to pain. Once out of bed, SBA-CGA required. Very fatigued this date due to not sleeping last night. Continued to encourage pt to ambulate with nursing to continue to improve mobility. Will continue to progress towards goals.

## 2018-07-13 NOTE — PLAN OF CARE
Problem: Patient Care Overview  Goal: Plan of Care Review  Outcome: Ongoing (interventions implemented as appropriate)   07/13/18 9525   Coping/Psychosocial   Plan of Care Reviewed With patient;significant other   OTHER   Outcome Summary F/u with 53y/o at bedside to reinforce DM ed. pls see DM ed flowsheet or note for more detail.

## 2018-07-13 NOTE — PROGRESS NOTES
Continued Stay Note  [facility]     Patient Name: Myles Toledo  MRN: [id number]  Today's Date: 7/13/2018    Admit Date: 7/7/2018          Discharge Plan     Row Name 07/13/18 1647       Plan    Plan Home    Patient/Family in Agreement with Plan yes    Plan Comments Pt confirms plan to return home.  Medications (novolog, novolin N, lopressor, augmentin) provided by [facility] retail pharmacy and purchased by [facility].  Medications at bedside.  No additional needs at this time.  DEWEY Dong RN              Discharge Codes    No documentation.           Naila Dong

## 2018-07-13 NOTE — PROGRESS NOTES
"    Chief Complaint: follow-up trapped lung, parapneumonic effusion  S/P: Left video assisted thoracoscopy with decortication  POD # 2  Subjective:  Symptoms:  Improved.  He reports chest pain.  No shortness of breath.    Diet:  Adequate intake.  No nausea or vomiting.    Activity level: Returning to normal.    Pain:  He complains of pain that is moderate.  He reports pain is improving.  Pain is requiring pain medication.        Vital Signs:  Temp:  [98.1 °F (36.7 °C)-99.1 °F (37.3 °C)] 98.3 °F (36.8 °C)  Heart Rate:  [86-90] 89  Resp:  [18-20] 20  BP: (120-148)/(84-87) 132/84    Intake & Output (last day)       07/12 0701 - 07/13 0700 07/13 0701 - 07/14 0700    P.O. 1080     IV Piggyback 100     Total Intake(mL/kg) 1180 (11)     Urine (mL/kg/hr) 1850 (0.7) 500 (1.8)    Chest Tube 11 (0)     Wound 0 (0)     Total Output 1861 500    Net -681 -500                Objective:  General Appearance:  Comfortable, well-appearing, in no acute distress and not in pain.    Vital signs: (most recent): Blood pressure 123/78, pulse 83, temperature 98.6 °F (37 °C), temperature source Oral, resp. rate 20, height 172.7 cm (68\"), weight 107 kg (235 lb 0.2 oz), SpO2 96 %.  Vital signs are normal.  No fever.    Output: Producing urine and producing stool.    HEENT: Normal HEENT exam.    Lungs:  Normal effort and normal respiratory rate.  He is not in respiratory distress.  There are decreased breath sounds.  (Diminished breath sounds in lower left lung field)  Heart: Normal rate.  Regular rhythm.  S1 normal and S2 normal.  No murmur.   Chest: Chest wall tenderness present.    Abdomen: Abdomen is soft and non-distended.  Bowel sounds are normal.   There is no abdominal tenderness.   There is no mass.   Extremities: Normal range of motion.  There is no dependent edema.    Pulses: Distal pulses are intact.    Neurological: Patient is alert and oriented to person, place and time.  Normal strength.    Pupils:  Pupils are equal, round, and " reactive to light.    Skin:  Warm and dry.              Chest tube:   Site: Left, Clean, Dry, Intact and Securement device intact  Suction: waterseal  Air Leak: negative  24 Hour Total: 5/4cc    Results Review:     I reviewed the patient's new clinical results.  I reviewed the patient's new imaging results and agree with the interpretation.  Discussed with patient and RN.    Imaging Results (last 24 hours)     Procedure Component Value Units Date/Time    XR Chest 1 View [[id number]] Collected:  07/12/18 0558     Updated:  07/13/18 0246    Narrative:       X-RAY CHEST 1 VIEW.     HISTORY: Chest tube management.     COMPARISON: 7/11/2018.     FINDINGS:  Cardiomegaly, left chest tubes are unchanged. Increasing pulmonary  congestion. Small left effusion.         Lung volumes are low.              Impression:       Worsening pulmonary congestion compared to prior study. Small left  effusion remains. Chest tubes are stable.         This report was finalized on 7/13/2018 2:43 AM by Dr. Keisha Kenyon M.D.             Lab Results:     Lab Results (last 24 hours)     Procedure Component Value Units Date/Time    Basic Metabolic Panel [[id number]]  (Abnormal) Collected:  07/13/18 0535    Specimen:  Blood Updated:  07/13/18 0632     Glucose 169 (H) mg/dL      BUN 10 mg/dL      Creatinine 0.49 (L) mg/dL      Sodium 135 (L) mmol/L      Potassium 4.5 mmol/L      Chloride 94 (L) mmol/L      CO2 27.4 mmol/L      Calcium 8.9 mg/dL      eGFR Non African Amer >150 mL/min/1.73      BUN/Creatinine Ratio 20.4     Anion Gap 13.6 mmol/L     Narrative:       GFR Normal >60  Chronic Kidney Disease <60  Kidney Failure <15    Magnesium [[id number]]  (Normal) Collected:  07/13/18 0535    Specimen:  Blood Updated:  07/13/18 0632     Magnesium 2.0 mg/dL     Phosphorus [[id number]]  (Normal) Collected:  07/13/18 0535    Specimen:  Blood Updated:  07/13/18 0632     Phosphorus 4.5 mg/dL     CBC (No Diff) [[id number]]  (Abnormal) Collected:  07/13/18 0535     Specimen:  Blood Updated:  07/13/18 0615     WBC 12.86 (H) 10*3/mm3      RBC 4.33 (L) 10*6/mm3      Hemoglobin 12.0 (L) g/dL      Hematocrit 36.5 (L) %      MCV 84.3 fL      MCH 27.7 pg      MCHC 32.9 g/dL      RDW 13.1 %      RDW-SD 39.8 fl      MPV 9.3 fL      Platelets 495 10*3/mm3     POC Glucose Once [[id number]]  (Abnormal) Collected:  07/13/18 0608    Specimen:  Blood Updated:  07/13/18 0609     Glucose 160 (H) mg/dL     Narrative:       Meter: [id number] : [id number] Milena Guzman RN    POC Glucose Once [[id number]]  (Abnormal) Collected:  07/12/18 2057    Specimen:  Blood Updated:  07/12/18 2058     Glucose 236 (H) mg/dL     Narrative:       Meter: [id number] : [id number] Reagan JAIN NA    Potassium [[id number]]  (Normal) Collected:  07/12/18 1812    Specimen:  Blood Updated:  07/12/18 1844     Potassium 4.7 mmol/L     POC Glucose Once [[id number]]  (Abnormal) Collected:  07/12/18 1624    Specimen:  Blood Updated:  07/12/18 1626     Glucose 250 (H) mg/dL     Narrative:       Meter: [id number] : [id number] Jhonathan CAMILO    POC Glucose Once [[id number]]  (Abnormal) Collected:  07/12/18 1140    Specimen:  Blood Updated:  07/12/18 1142     Glucose 217 (H) mg/dL     Narrative:       Meter: [id number] : [id number] Jhonathan CAMILO    Anaerobic Culture - Tissue, Pleural Cavity [[id number]]  (Normal) Collected:  07/09/18 1204    Specimen:  Tissue from Pleural Cavity Updated:  07/12/18 1050     Culture No anaerobes isolated at 3 days           Assessment/Plan     Active Problems:    DKA (diabetic ketoacidoses) (CMS/HCC)       Assessment:    Condition: In stable condition.  Improving.       Plan:   Encourage ambulation.  Start/continue incentive spirometry.  Regular diet.  Chest x-ray.  Administer medications as ordered.       I have independently reviewed this morning's chest x-ray and agree with radiology interpretation.  There is no pneumothorax.      1. Paraneumonic effusion with trapped  lung: S/P Left VAT with decortication; patient is stable post-op. Patient has had minimal chest tube drainage and has no air leak. I removed the second chest tube today and the patient tolerated without any complication.  Remaining chest tube is to waterseal. I will order a follow-up chest x-ray for tomorrow morning with hope to remove the final chest tube at that point. Encouraged good pulmonary hygiene, incentive spirometry.      2. Pneumonia: Antibiotic and respiratory therapy per pulmonary service.     3. Diabetic ketoacidosis: Resolved. Patient states he is needle phobic and hasn't been taking his insulin for this reason. Diabetes educator has seen him and he found it helpful. They are helping him with discharge planning for DM management.     4. Pain control: Scheduled Toradol. PRN Medications ordered. Transition to PO vs IV pain medication. Patient does not wish to take neurontin due to previous adverse event - although he denies allergy, states he does not tolerate and it makes him very drowsy. He is complaining of a burning pain, so it is unfortunate he cannot tolerate gabapentin.    RUBENS Spence  [facility]  07/13/18  9:38 AM      Patient seen and examined.  Plan chest tube removal tomorrow

## 2018-07-14 ENCOUNTER — APPOINTMENT (OUTPATIENT)
Dept: GENERAL RADIOLOGY | Facility: HOSPITAL | Age: 53
End: 2018-07-14

## 2018-07-14 LAB
BACTERIA SPEC ANAEROBE CULT: NORMAL
DEPRECATED RDW RBC AUTO: 39.9 FL (ref 37–54)
ERYTHROCYTE [DISTWIDTH] IN BLOOD BY AUTOMATED COUNT: 13 % (ref 11.5–14.5)
GLUCOSE BLDC GLUCOMTR-MCNC: 212 MG/DL (ref 70–130)
GLUCOSE BLDC GLUCOMTR-MCNC: 257 MG/DL (ref 70–130)
GLUCOSE BLDC GLUCOMTR-MCNC: 262 MG/DL (ref 70–130)
GLUCOSE BLDC GLUCOMTR-MCNC: 303 MG/DL (ref 70–130)
HCT VFR BLD AUTO: 36.2 % (ref 40.4–52.2)
HGB BLD-MCNC: 11.8 G/DL (ref 13.7–17.6)
MCH RBC QN AUTO: 27.6 PG (ref 27–32.7)
MCHC RBC AUTO-ENTMCNC: 32.6 G/DL (ref 32.6–36.4)
MCV RBC AUTO: 84.8 FL (ref 79.8–96.2)
PLATELET # BLD AUTO: 557 10*3/MM3 (ref 140–500)
PMV BLD AUTO: 9 FL (ref 6–12)
RBC # BLD AUTO: 4.27 10*6/MM3 (ref 4.6–6)
WBC NRBC COR # BLD: 12.88 10*3/MM3 (ref 4.5–10.7)

## 2018-07-14 PROCEDURE — 94799 UNLISTED PULMONARY SVC/PX: CPT

## 2018-07-14 PROCEDURE — 63710000001 INSULIN ISOPHANE HUMAN PER 5 UNITS: Performed by: INTERNAL MEDICINE

## 2018-07-14 PROCEDURE — 71045 X-RAY EXAM CHEST 1 VIEW: CPT

## 2018-07-14 PROCEDURE — 82962 GLUCOSE BLOOD TEST: CPT

## 2018-07-14 PROCEDURE — 25010000002 ENOXAPARIN PER 10 MG: Performed by: INTERNAL MEDICINE

## 2018-07-14 PROCEDURE — 85027 COMPLETE CBC AUTOMATED: CPT | Performed by: INTERNAL MEDICINE

## 2018-07-14 PROCEDURE — 63710000001 INSULIN ASPART PER 5 UNITS: Performed by: INTERNAL MEDICINE

## 2018-07-14 PROCEDURE — 25010000002 HYDROMORPHONE PER 4 MG: Performed by: INTERNAL MEDICINE

## 2018-07-14 RX ADMIN — ENOXAPARIN SODIUM 40 MG: 40 INJECTION SUBCUTANEOUS at 23:59

## 2018-07-14 RX ADMIN — HYDROMORPHONE HYDROCHLORIDE 0.5 MG: 1 INJECTION, SOLUTION INTRAMUSCULAR; INTRAVENOUS; SUBCUTANEOUS at 23:59

## 2018-07-14 RX ADMIN — HUMAN INSULIN 20 UNITS: 100 INJECTION, SUSPENSION SUBCUTANEOUS at 17:25

## 2018-07-14 RX ADMIN — HYDROMORPHONE HYDROCHLORIDE 0.5 MG: 1 INJECTION, SOLUTION INTRAMUSCULAR; INTRAVENOUS; SUBCUTANEOUS at 12:09

## 2018-07-14 RX ADMIN — HYDROMORPHONE HYDROCHLORIDE 0.5 MG: 1 INJECTION, SOLUTION INTRAMUSCULAR; INTRAVENOUS; SUBCUTANEOUS at 20:16

## 2018-07-14 RX ADMIN — HYDROCODONE BITARTRATE AND ACETAMINOPHEN 2 TABLET: 7.5; 325 TABLET ORAL at 04:03

## 2018-07-14 RX ADMIN — INSULIN ASPART 4 UNITS: 100 INJECTION, SOLUTION INTRAVENOUS; SUBCUTANEOUS at 08:21

## 2018-07-14 RX ADMIN — HYDROCODONE BITARTRATE AND ACETAMINOPHEN 2 TABLET: 7.5; 325 TABLET ORAL at 23:59

## 2018-07-14 RX ADMIN — METOPROLOL TARTRATE 25 MG: 25 TABLET ORAL at 08:21

## 2018-07-14 RX ADMIN — INSULIN ASPART 7 UNITS: 100 INJECTION, SOLUTION INTRAVENOUS; SUBCUTANEOUS at 21:49

## 2018-07-14 RX ADMIN — INSULIN ASPART 6 UNITS: 100 INJECTION, SOLUTION INTRAVENOUS; SUBCUTANEOUS at 17:25

## 2018-07-14 RX ADMIN — HYDROMORPHONE HYDROCHLORIDE 0.5 MG: 1 INJECTION, SOLUTION INTRAMUSCULAR; INTRAVENOUS; SUBCUTANEOUS at 16:12

## 2018-07-14 RX ADMIN — HUMAN INSULIN 20 UNITS: 100 INJECTION, SUSPENSION SUBCUTANEOUS at 10:30

## 2018-07-14 RX ADMIN — HYDROCODONE BITARTRATE AND ACETAMINOPHEN 2 TABLET: 7.5; 325 TABLET ORAL at 20:08

## 2018-07-14 RX ADMIN — INSULIN ASPART 6 UNITS: 100 INJECTION, SOLUTION INTRAVENOUS; SUBCUTANEOUS at 14:17

## 2018-07-14 RX ADMIN — HYDROMORPHONE HYDROCHLORIDE 0.5 MG: 1 INJECTION, SOLUTION INTRAMUSCULAR; INTRAVENOUS; SUBCUTANEOUS at 04:02

## 2018-07-14 RX ADMIN — HYDROCODONE BITARTRATE AND ACETAMINOPHEN 2 TABLET: 7.5; 325 TABLET ORAL at 08:21

## 2018-07-14 RX ADMIN — HYDROCODONE BITARTRATE AND ACETAMINOPHEN 2 TABLET: 7.5; 325 TABLET ORAL at 14:17

## 2018-07-14 RX ADMIN — HYDROMORPHONE HYDROCHLORIDE 0.5 MG: 1 INJECTION, SOLUTION INTRAMUSCULAR; INTRAVENOUS; SUBCUTANEOUS at 08:21

## 2018-07-14 RX ADMIN — AMOXICILLIN AND CLAVULANATE POTASSIUM 1 TABLET: 875; 125 TABLET, FILM COATED ORAL at 21:49

## 2018-07-14 RX ADMIN — METOPROLOL TARTRATE 25 MG: 25 TABLET ORAL at 21:49

## 2018-07-14 RX ADMIN — AMOXICILLIN AND CLAVULANATE POTASSIUM 1 TABLET: 875; 125 TABLET, FILM COATED ORAL at 08:21

## 2018-07-14 NOTE — PLAN OF CARE
Problem: Patient Care Overview  Goal: Plan of Care Review   07/14/18 1522   OTHER   Outcome Summary Pt ambulatingly independently in room when PT arrived and agreeable to ambualting in hallway. Pt ambulated 150 ft independently with no LOB. Encouraged pt to practice stairs with therapy, but he denies a need and reports he plans to use a handrail. Pt is safe to d/c home when medically stable. Encouraged to ambualte with supervision while admitted. PT will sign off.

## 2018-07-14 NOTE — THERAPY DISCHARGE NOTE
Acute Care - Physical Therapy Treatment Note/Discharge  [facility]     Patient Name: Myles Toledo  : 1965  MRN: [id number]  Today's Date: 2018        Referring Physician: [place]    Admit Date: 2018    Visit Dx:    ICD-10-CM ICD-9-CM   1. Pleural effusion J90 511.9   2. Pneumonia J18.9 486   3. Decreased mobility R26.89 781.99     Patient Active Problem List   Diagnosis   • DKA (diabetic ketoacidoses) (CMS/[facility])       Physical Therapy Education     Title: PT OT SLP Therapies (Active)     Topic: Physical Therapy (Active)     Point: Mobility training (Done)    Learning Progress Summary     Learner Status Readiness Method Response Comment Documented by    Patient Done Acceptance E VU   18 1522     Done Acceptance E VU,NR  MG 18 1023     Done Acceptance E VU,NR  MG 18 1356     Done Acceptance E VU,NR  MG 18 1158          Point: Body mechanics (Done)    Learning Progress Summary     Learner Status Readiness Method Response Comment Documented by    Patient Done Acceptance E VU,NR  MG 18 1023          Point: Precautions (Done)    Learning Progress Summary     Learner Status Readiness Method Response Comment Documented by    Patient Done Acceptance E VU   18 1522     Done Acceptance E VU,NR  MG 18 1023     Done Acceptance E VU,NR  MG 18 1356     Done Acceptance E VU,NR  MG 18 1158                      User Key     Initials Effective Dates Name Provider Type Discipline     18 -  Madhavi Hinson, PT Physical Therapist PT    MG 18 -  Megan Gosselin, PT Physical Therapist PT                Therapy Treatment        Rehabilitation Treatment Summary     Row Name 18 1519             Treatment Time/Intention    Discipline physical therapist  -      Document Type discharge treatment  -      Subjective Information no complaints  -      Mode of Treatment physical therapy;individual therapy  -KH      Recorded by []  Madhavi Hinson, PT 07/14/18 1521      Row Name 07/14/18 1519             Bed Mobility Assessment/Treatment    Comment (Bed Mobility) up up ambulating in room  -KH      Recorded by [KH] Madhavi Hinson, PT 07/14/18 1521      Row Name 07/14/18 1519             Transfer Assessment/Treatment    Comment (Transfers) up in room  -KH      Recorded by [KH] Madhavi Hinson, PT 07/14/18 1521      Row Name 07/14/18 1519             Stand-Sit Transfer    Stand-Sit Hawkins (Transfers) supervision  -KH      Recorded by [KH] Madhavi Hinson, PT 07/14/18 1521      Row Name 07/14/18 1519             Gait/Stairs Assessment/Training    Hawkins Level (Gait) independent  -KH      Distance in Feet (Gait) 150  -KH      Pattern (Gait) swing-through  -KH      Deviations/Abnormal Patterns (Gait) chau decreased  -KH      Comment (Gait/Stairs) pt confirms he has steps at home, but denies need to practice stairs here.   -KH      Recorded by [KH] Madhavi Hinson, PT 07/14/18 1521      Row Name 07/14/18 1519             Positioning and Restraints    Pre-Treatment Position standing in room  -KH      Post Treatment Position other  -KH      Other Position return to room with caregiver   family present  -KH      Recorded by [KH] Madhavi Hinson, PT 07/14/18 1521      Row Name 07/14/18 1519             Pain Scale: Word Pre/Post-Treatment    Pain: Word Scale, Pretreatment 2 - mild pain  -KH      Pain: Word Scale, Post-Treatment 2 - mild pain  -KH      Recorded by [KH] Madhavi Hinson, PT 07/14/18 1521      Row Name                Wound 07/09/18 1305 Left chest incision    Wound - Properties Group Date first assessed: 07/09/18 [MM] Time first assessed: 1305 [MM] Side: Left [MM] Location: chest [MM] Type: incision [MM] Recorded by:  [MM] Marci Lobo RN 07/09/18 1305    Row Name 07/14/18 1519             Outcome Summary/Treatment Plan (PT)    Anticipated Discharge Disposition (PT) home  with assist  -KH      Recorded by [KH] Madhavi Morrissey Sravan, PT 07/14/18 1521        User Key  (r) = Recorded By, (t) = Taken By, (c) = Cosigned By    Initials Name Effective Dates Discipline     Madhavi Hinson, PT 06/08/18 -  PT    MM Marci Lobo, RN 06/16/16 -  Nurse        Wound 07/09/18 1305 Left chest incision (Active)   Dressing Appearance dry;intact 7/14/2018  8:43 AM   Drainage Amount none 7/14/2018  8:43 AM   Dressing Care, Wound gauze 7/13/2018  8:00 PM   Wound Output (mL) 0 7/14/2018 12:00 AM       PT Recommendation and Plan  Anticipated Discharge Disposition (PT): home with assist  Outcome Summary/Treatment Plan (PT)  Anticipated Discharge Disposition (PT): home with assist  Outcome Summary: Pt ambulatingly independently in room when PT arrived and agreeable to ambualting in hallway. Pt ambulated 150 ft independently with no LOB. Encouraged pt to practice stairs with therapy, but he denies a need and reports he plans to use a handrail. Pt is safe to d/c home when medically stable. Encouraged to ambualte with supervision while admitted. PT will sign off.           Outcome Measures     Row Name 07/14/18 1500 07/13/18 1000 07/12/18 1300       How much help from another person do you currently need...    Turning from your back to your side while in flat bed without using bedrails? 4  -KH 4  -MG 4  -MG    Moving from lying on back to sitting on the side of a flat bed without bedrails? 4  -KH 3  -MG 4  -MG    Moving to and from a bed to a chair (including a wheelchair)? 4  -KH 3  -MG 3  -MG    Standing up from a chair using your arms (e.g., wheelchair, bedside chair)? 4  -KH 3  -MG 3  -MG    Climbing 3-5 steps with a railing? 3  -KH 3  -MG 3  -MG    To walk in hospital room? 4  -KH 3  -MG 3  -MG    AM-PAC 6 Clicks Score 23  -KH 19  -MG 20  -MG       Functional Assessment    Outcome Measure Options AM-PAC 6 Clicks Basic Mobility (PT)  -KH AM-PAC 6 Clicks Basic Mobility (PT)  -MG AM-PAC 6  Clicks Basic Mobility (PT)  -MG      User Key  (r) = Recorded By, (t) = Taken By, (c) = Cosigned By    Initials Name Provider Type    MELQUIADES Hinson, PT Physical Therapist    MG Megan Gosselin, PT Physical Therapist           Time Calculation:         PT Charges     Row Name 07/14/18 1518             Time Calculation    Start Time 1510  -      Stop Time 1515  -KH      Time Calculation (min) 5 min  -      PT Received On 07/14/18  -      PT - Next Appointment 07/15/18  -        User Key  (r) = Recorded By, (t) = Taken By, (c) = Cosigned By    Initials Name Provider Type    MELQUIADES Hinson, PT Physical Therapist        Therapy Suggested Charges     Code   Minutes Charges    None         Time spent with pt less than 8 minutes        PT G-Codes  Outcome Measure Options: AM-PAC 6 Clicks Basic Mobility (PT)    PT Discharge Summary  Anticipated Discharge Disposition (PT): home with assist    Madhavi Hinson, PT  7/14/2018

## 2018-07-14 NOTE — PLAN OF CARE
Problem: Patient Care Overview  Goal: Plan of Care Review  Outcome: Ongoing (interventions implemented as appropriate)   07/14/18 0107   Coping/Psychosocial   Plan of Care Reviewed With patient;significant other   OTHER   Outcome Summary VSS, 2nd CT removed yesterday, 3rd and last CT remaining to R chest to WS with no output noted, c/o pain managed with PRN dilaudid and lortab, SO present at bedside and participating in care, will cont to monitor   Plan of Care Review   Progress improving

## 2018-07-14 NOTE — PROGRESS NOTES
Dr. DURGA Riggins    [address]    7/14/2018    Patient ID:  Name:  Myles Toledo  MRN:  [id number]  1965  52 y.o.  male            CC/Reason for visit: Follow-up for left empyema and other medical problems listed below    HPI: Feels better.  2 of his 3 chest tubes have been removed from the left chest.  He has no fever, chills or sputum    Vitals:  Vitals:    07/13/18 2300 07/14/18 0743 07/14/18 0935 07/14/18 1106   BP: 124/79 129/86  123/81   BP Location: Left arm Left arm  Left arm   Patient Position: Lying Sitting  Lying   Pulse: 92 88  83   Resp: 18 18  16   Temp: 99 °F (37.2 °C) 97.8 °F (36.6 °C)  98.2 °F (36.8 °C)   TempSrc: Oral Oral  Oral   SpO2: 99% 98% 98% 98%   Weight:       Height:               Body mass index is 35.73 kg/m².    Intake/Output Summary (Last 24 hours) at 07/14/18 1455  Last data filed at 07/14/18 1317   Gross per 24 hour   Intake             1830 ml   Output              725 ml   Net             1105 ml       Exam:  GEN:  No distress,   LUNGS: Left chest shows one chest tube in place.  Good air entry bilaterally., nonlabored breathing  CV:  Normal S1S2, without murmur, no edema  ABD:  Non tender, no enlarged liver or masses  EXT:  No cyanosis or clubbing    Scheduled meds:    amoxicillin-clavulanate 1 tablet Oral Q12H   docusate sodium 100 mg Oral BID   enoxaparin 40 mg Subcutaneous Q24H   insulin aspart 0-9 Units Subcutaneous 4x Daily With Meals & Nightly   insulin NPH 20 Units Subcutaneous BID AC   lidocaine 1 patch Transdermal Q24H   metoprolol tartrate 25 mg Oral Q12H   polyethylene glycol 17 g Oral Daily   sennosides-docusate sodium 2 tablet Oral Nightly     IV meds:                           Data Review:   I reviewed the patient's medications and new clinical results.  Lab Results   Component Value Date    CALCIUM 8.9 07/13/2018    PHOS 4.5 07/13/2018    MG 2.0 07/13/2018    MG 2.1 07/12/2018    MG 2.0 07/08/2018       Results from last 7 days  Lab Units  07/14/18  0927 07/13/18  0535 07/12/18  1812 07/12/18  0637  07/10/18  0154  07/07/18 2005   SODIUM mmol/L  --  135*  --  134*  --  129*  < >  --    POTASSIUM mmol/L  --  4.5 4.7 3.4*  < > 3.6  < >  --    CHLORIDE mmol/L  --  94*  --  96*  --  94*  < >  --    CO2 mmol/L  --  27.4  --  27.4  --  19.3*  < >  --    BUN mg/dL  --  10  --  10  --  11  < >  --    CREATININE mg/dL  --  0.49*  --  0.46*  --  0.52*  < >  --    CALCIUM mg/dL  --  8.9  --  8.9  --  8.1*  < >  --    BILIRUBIN mg/dL  --   --   --  0.3  --   --   --   --    ALK PHOS U/L  --   --   --  109  --   --   --   --    ALT (SGPT) U/L  --   --   --  6  --   --   --   --    AST (SGOT) U/L  --   --   --  5  --   --   --   --    GLUCOSE mg/dL  --  169*  --  100*  --  276*  < >  --    WBC 10*3/mm3 12.88* 12.86*  --  13.40*  < > 15.92*  < >  --    HEMOGLOBIN g/dL 11.8* 12.0*  --  11.0*  < > 11.6*  < >  --    PLATELETS 10*3/mm3 557* 495  --  421  < > 374  < >  --    INR   --   --   --   --   --   --   --  1.24*   < > = values in this interval not displayed.    Results from last 7 days  Lab Units 07/09/18  1204 07/09/18  1153 07/08/18  1302   BODYFLDCX   --  No growth  --    RESPCX   --   --  Heavy growth (4+) Normal Respiratory [place]   GRAM STAIN RESULT  Rare (1+) WBCs seen  No organisms seen Rare (1+) WBCs seen  No organisms seen Rare (1+) WBCs seen  Rare (1+) Epithelial cells per low power field  Mixed bacterial morphotypes seen on Gram Stain           ASSESSMENT:   Left empyema, s/p vats  LLL pneumonia  Sepsis resolved  Relevant Medical Diagnoses  DKA, resolved  Poorly controlled DM  HTN    PLAN:  Doing well.  On Augmentin at this time.  As soon as the other chest tube is removed and the patient will be ready for discharge home.  On NPH twice daily for diabetes.  Has received diabetic education and all his questions about diabetes have been addressed.  Needs follow-up CT of the chest in 8 weeks  Will need outpatient workup for sleep apnea      Aristeo HILLMAN  MD Vaishnavi  7/14/2018

## 2018-07-15 ENCOUNTER — APPOINTMENT (OUTPATIENT)
Dept: GENERAL RADIOLOGY | Facility: HOSPITAL | Age: 53
End: 2018-07-15

## 2018-07-15 VITALS
WEIGHT: 235.01 LBS | BODY MASS INDEX: 35.62 KG/M2 | DIASTOLIC BLOOD PRESSURE: 76 MMHG | TEMPERATURE: 98 F | RESPIRATION RATE: 16 BRPM | HEART RATE: 70 BPM | OXYGEN SATURATION: 96 % | SYSTOLIC BLOOD PRESSURE: 110 MMHG | HEIGHT: 68 IN

## 2018-07-15 PROBLEM — J86.9 EMPYEMA (HCC): Status: ACTIVE | Noted: 2018-07-15

## 2018-07-15 LAB
DEPRECATED RDW RBC AUTO: 39.6 FL (ref 37–54)
ERYTHROCYTE [DISTWIDTH] IN BLOOD BY AUTOMATED COUNT: 12.9 % (ref 11.5–14.5)
GLUCOSE BLDC GLUCOMTR-MCNC: 201 MG/DL (ref 70–130)
GLUCOSE BLDC GLUCOMTR-MCNC: 255 MG/DL (ref 70–130)
HCT VFR BLD AUTO: 37 % (ref 40.4–52.2)
HGB BLD-MCNC: 12.1 G/DL (ref 13.7–17.6)
MCH RBC QN AUTO: 27.8 PG (ref 27–32.7)
MCHC RBC AUTO-ENTMCNC: 32.7 G/DL (ref 32.6–36.4)
MCV RBC AUTO: 84.9 FL (ref 79.8–96.2)
PLATELET # BLD AUTO: 573 10*3/MM3 (ref 140–500)
PMV BLD AUTO: 9 FL (ref 6–12)
RBC # BLD AUTO: 4.36 10*6/MM3 (ref 4.6–6)
WBC NRBC COR # BLD: 15.43 10*3/MM3 (ref 4.5–10.7)

## 2018-07-15 PROCEDURE — 25010000002 HYDROMORPHONE PER 4 MG: Performed by: INTERNAL MEDICINE

## 2018-07-15 PROCEDURE — 94799 UNLISTED PULMONARY SVC/PX: CPT

## 2018-07-15 PROCEDURE — 71045 X-RAY EXAM CHEST 1 VIEW: CPT

## 2018-07-15 PROCEDURE — 85027 COMPLETE CBC AUTOMATED: CPT | Performed by: INTERNAL MEDICINE

## 2018-07-15 PROCEDURE — 63710000001 INSULIN ISOPHANE HUMAN PER 5 UNITS: Performed by: INTERNAL MEDICINE

## 2018-07-15 PROCEDURE — 82962 GLUCOSE BLOOD TEST: CPT

## 2018-07-15 PROCEDURE — 63710000001 INSULIN ASPART PER 5 UNITS: Performed by: INTERNAL MEDICINE

## 2018-07-15 RX ADMIN — INSULIN ASPART 4 UNITS: 100 INJECTION, SOLUTION INTRAVENOUS; SUBCUTANEOUS at 08:06

## 2018-07-15 RX ADMIN — INSULIN ASPART 6 UNITS: 100 INJECTION, SOLUTION INTRAVENOUS; SUBCUTANEOUS at 12:02

## 2018-07-15 RX ADMIN — HYDROCODONE BITARTRATE AND ACETAMINOPHEN 2 TABLET: 7.5; 325 TABLET ORAL at 04:18

## 2018-07-15 RX ADMIN — HYDROMORPHONE HYDROCHLORIDE 0.5 MG: 1 INJECTION, SOLUTION INTRAMUSCULAR; INTRAVENOUS; SUBCUTANEOUS at 04:18

## 2018-07-15 RX ADMIN — HYDROCODONE BITARTRATE AND ACETAMINOPHEN 2 TABLET: 7.5; 325 TABLET ORAL at 10:15

## 2018-07-15 RX ADMIN — AMOXICILLIN AND CLAVULANATE POTASSIUM 1 TABLET: 875; 125 TABLET, FILM COATED ORAL at 08:05

## 2018-07-15 RX ADMIN — METOPROLOL TARTRATE 25 MG: 25 TABLET ORAL at 08:06

## 2018-07-15 RX ADMIN — HUMAN INSULIN 20 UNITS: 100 INJECTION, SUSPENSION SUBCUTANEOUS at 08:06

## 2018-07-15 RX ADMIN — HYDROMORPHONE HYDROCHLORIDE 0.5 MG: 1 INJECTION, SOLUTION INTRAMUSCULAR; INTRAVENOUS; SUBCUTANEOUS at 12:02

## 2018-07-15 RX ADMIN — HYDROCODONE BITARTRATE AND ACETAMINOPHEN 2 TABLET: 7.5; 325 TABLET ORAL at 15:14

## 2018-07-15 RX ADMIN — HYDROMORPHONE HYDROCHLORIDE 0.5 MG: 1 INJECTION, SOLUTION INTRAMUSCULAR; INTRAVENOUS; SUBCUTANEOUS at 08:10

## 2018-07-15 NOTE — PROGRESS NOTES
Patient continues postop from left lung decortication.  Chest tube removed yesterday.  Follow-up chest x-ray shows no pneumothorax.  Some residual infiltrate or effusion is present.  Overall the process appears to be improved.  The patient is cleared for discharge.  Plan for follow-up with Dr. Oliver in 2 weeks.

## 2018-07-15 NOTE — PLAN OF CARE
Problem: Patient Care Overview  Goal: Plan of Care Review  Outcome: Ongoing (interventions implemented as appropriate)   07/15/18 7868   Coping/Psychosocial   Plan of Care Reviewed With patient   OTHER   Outcome Summary VSS, c/o of severe pain, pt unable to wean off dilaudid for pain control. pain managed with PRN lortab and dilaudid, SO at bedside and participating in care, possible d/c home tomorrow or monday. will cont to monitor   Plan of Care Review   Progress improving

## 2018-07-15 NOTE — DISCHARGE SUMMARY
Patient Identification:  Name: Myles Toledo  Age: 52 y.o.  Sex: male  :  1965  MRN: [id number]  Primary Care Physician: Arlette Read MD    Admit date: 2018  Discharge date and time: July 15, 2018   Discharged Condition: good    Discharge Diagnoses:Principal Problem:    Empyema (CMS/HCC)  Active Problems:    DKA (diabetic ketoacidoses) (CMS/HCC)   Sepsis  Status post left thoracic surgery with decortication      Hospital Course: Myles Toledo presented to [facility]  with left-sided chest pain and signs and symptoms of sepsis..  He was diagnosed with diabetic ketoacidosis as well as a left-sided pneumonia with an empyema.  He was seen by endocrinology and received appropriate intravenous insulin followed by DKA protocol with IV fluids.  Subsequently he was transitioned to subcutaneous insulin and he received diabetic education and nutritional education.  After proper teaching the patient is now going home on NPH twice daily, plus sliding scale and glucose meter.    He was found to have a left-sided empyema.  For this reason he was seen by thoracic surgery and they performed surgery with decortication on the left side.  He had several chest tubes in for several days.  He is now going home after all chest tubes were removed successfully with another 28 days of Augmentin therapy.    He's been weaned off oxygen and he has been afebrile for the past 72 hours.  He has benefited maximally from inpatient care and is ready for discharge home.      Consults:   IP CONSULT TO CASE MANAGEMENT   IP CONSULT TO NUTRITION SERVICES  IP CONSULT TO THORACIC SURGERY  IP CONSULT TO CASE MANAGEMENT   IP CONSULT TO DIABETES EDUCATOR  IP CONSULT TO DIABETES EDUCATOR    Significant Diagnostic Studies:   Imaging Results (most recent)     Procedure Component Value Units Date/Time    XR Chest 1 View [[id number]] Collected:  07/15/18 0556     Updated:  07/15/18 0556     Narrative:       X-RAY CHEST 1 VIEW.     HISTORY: Chest tube management.     COMPARISON: 7/14/2018.     FINDINGS:  Cardiomediastinal silhouette is within normal limits. Low lung volumes  remain.     Pulmonary congestion, however there is interval improvement. Left chest  tube has been removed, there is a small left effusion. Mild opacity at  the left lung base.          Impression:       Improving progression. Chest tube has been removed.      Opacity at the left lung base may represent an infiltrate, there is a  persistent small left effusion.        XR Chest 1 View [[id number]] Collected:  07/14/18 0713     Updated:  07/14/18 0751    Narrative:       ONE VIEW PORTABLE CHEST AT 5:40 AM     HISTORY: Left chest tube.     FINDINGS: There is a left-sided chest tube ending near the apex and no  evidence of pneumothorax. There remains some localized atelectasis and  pleural fluid at the left base similar to yesterday's exam. The heart  remains mildly enlarged.     This report was finalized on 7/14/2018 7:48 AM by Dr. Kenyon Huerta M.D.       XR Chest 1 View [[id number]] Collected:  07/13/18 1058     Updated:  07/13/18 1108    Narrative:       Portable chest x-ray     HISTORY: Chest tube management.     Portable chest x-ray was acquired at around 10:30 AM July 13 and is  correlated with the x-ray from yesterday morning.     FINDINGS: Two left chest tubes remain. There is no pneumothorax. Some  opacity at the left lung base is again observed. Aeration at the left  lung is somewhat improved and vascular volume has normalized. The right  lung is clear.       Impression:       One of three left chest tubes has been removed in the interval. There is  no pneumothorax.     This report was finalized on 7/13/2018 10:59 AM by Dr. Arias Kang M.D.       XR Chest 1 View [[id number]] Collected:  07/12/18 0558     Updated:  07/13/18 0246    Narrative:       X-RAY CHEST 1 VIEW.     HISTORY: Chest tube management.     COMPARISON:  7/11/2018.     FINDINGS:  Cardiomegaly, left chest tubes are unchanged. Increasing pulmonary  congestion. Small left effusion.         Lung volumes are low.              Impression:       Worsening pulmonary congestion compared to prior study. Small left  effusion remains. Chest tubes are stable.         This report was finalized on 7/13/2018 2:43 AM by Dr. Keisha Kenyon M.D.       XR Chest 1 View [[id number]] Collected:  07/11/18 0818     Updated:  07/11/18 0925    Narrative:       CLINICAL HISTORY: 52-year-old male 2 days postop left video-assisted  thoracoscopy with decortication. Follow-up with left chest tubes.     PORTABLE AP SEMIERECT CHEST DATED 07/11/2018 AT 0556 HOURS     FINDINGS: When compared to the most recent available prior chest  radiograph dated 07/10/2018 at 0652 hours, the 3 left chest tubes  remain. A 2 mm left apical pleural separation remains. There may be a  trace of medial left lower chest pleural separation. No large  pneumothorax is seen on the current exam. Fairly extensive patchy  opacity throughout the left lung with some dense opacity in the basilar  left lung remain. Thickening of lateral pleural stripe at the lateral to  lower lateral left chest remains. Modest hazy to patchy opacity in the  right lung persists. Lung volumes are similar to minimally increased on  the current exam. Borderline to mild cardiomegaly remains.     CONCLUSION: Slightly increased lung volumes with similar patchy to dense  opacities in the left chest and persistent hazy to patchy opacity in the  perihilar and basilar right chest. The 3 left chest tubes remain, and  there is again a small left apical pneumothorax.     This report was finalized on 7/11/2018 9:21 AM by Dr. Franko Zapien M.D.       XR Chest 1 View [[id number]] Collected:  07/09/18 1454     Updated:  07/10/18 1356    Narrative:       PORTABLE CHEST     HISTORY: Postop management.     COMPARISON: None.     A single portable view of the chest  demonstrates the heart to be within  normal limits in size. Three left-sided chest tubes are noted. The 2  superior chest tubes demonstrates side ports superimposed over the  lateral margin of the hemithorax. There is no evidence of pneumothorax.  There is mild prominence of the pulmonary vasculature suggesting mild  congestive changes. There is increased density present at the left lung  base which may represent focal infiltrate/atelectasis and layering  pleural fluid. There is no evidence of consolidation. A follow-up PA and  lateral view of the chest is suggested.     The above information was called to the patient's nurse at the time of  the dictation. The patient's nurse is to relay the information to the  clinical service.     This report was finalized on 7/10/2018 1:53 PM by Dr. Ko Burnette M.D.       XR Chest 1 View [[id number]] Collected:  07/10/18 0822     Updated:  07/10/18 0827    Narrative:       HISTORY: 52-year-old male 1 day postop left video-assisted thoracoscopy  with decortication. Follow up with left chest tubes.      PORTABLE AP SEMIERECT CHEST DATED 07/10/2018 AT 0652 HOURS.     FINDINGS: When compared to the most recent available prior chest  radiograph, the portable AP erect projection of 07/09/2018 at 1346  hours, there are again 3 left chest tubes present. The sidehole of at  least one of the left chest tubes lies outside the rib cage. There is a  1 mm trace of left apical pleural separation; no large pneumothorax is  seen. Cardiac silhouette remains upper normal caliber. Modest patchy  perihilar opacity and prominence of pulmonary vascular and interstitial  markings remain in the right lung. Hazy to patchy opacity throughout the  perihilar, and especially basilar, left lung remain and may have  increased slightly. Some pleural thickening about the mid to lower  lateral left chest is present. The costophrenic angles appear to be  sharp. No acute change in bony thorax is seen. Monitoring  lead wires are  present.     CONCLUSION: Similar to slightly diminished lung volumes with similar to  increased patchy to dense opacity in the left chest and modest patchy  opacity in the right chest. Only a trace of left apical pneumothorax is  seen with 3 left chest tubes present.     This report was finalized on 7/10/2018 8:24 AM by Dr. Franko Zapien M.D.                 Results from last 7 days  Lab Units 07/09/18  1204 07/09/18  1153   BODYFLDCX   --  No growth   GRAM STAIN RESULT  Rare (1+) WBCs seen  No organisms seen Rare (1+) WBCs seen  No organisms seen     TEST  RESULTS PENDING AT DISCHARGE   Order Current Status    AFB Culture - Body Fluid, Pleural Cavity Preliminary result    AFB Culture - Tissue, Pleural Cavity Preliminary result    Fungus Culture - Body Fluid, Pleural Cavity Preliminary result    Fungus Culture - Tissue, Pleural Cavity Preliminary result          Discharge Exam:  Alert and oriented x 4, in NAD  Supple neck, midline trach  RRR, no m/r/g, no edema  Right lung sounds entirely clear.  Left lung somewhat diminished over the bases but no wheezing or crackles.  no wheezing, nonlabored  No clubbing or cyanosis     Disposition:  Home    Patient Instructions:      Discharge Medications      New Medications      Instructions Start Date   amoxicillin-clavulanate 875-125 MG per tablet  Commonly known as:  AUGMENTIN   1 tablet, Oral, Every 12 Hours Scheduled      metoprolol tartrate 25 MG tablet  Commonly known as:  LOPRESSOR   25 mg, Oral, Every 12 Hours Scheduled      NOVOLIN N 100 UNIT/ML injection  Generic drug:  insulin NPH   20 Units, Subcutaneous, 2 Times Daily Before Meals      NOVOLOG 100 UNIT/ML injection  Generic drug:  insulin aspart   0-9 Units, Subcutaneous, 4 Times Daily With Meals & Nightly         Continue These Medications      Instructions Start Date   BABY ASPIRIN PO   1 tablet, Oral, Daily         Stop These Medications    lisinopril 20 MG tablet  Commonly known as:   PRINIVIL,ZESTRIL     naproxen sodium 220 MG tablet  Commonly known as:  ALEVE             Your medication list      START taking these medications      Instructions Last Dose Given Next Dose Due   amoxicillin-clavulanate 875-125 MG per tablet  Commonly known as:  AUGMENTIN      Take 1 tablet by mouth Every 12 (Twelve) Hours for 28 doses.       metoprolol tartrate 25 MG tablet  Commonly known as:  LOPRESSOR      Take 1 tablet by mouth Every 12 (Twelve) Hours for 30 days.       NOVOLIN N 100 UNIT/ML injection  Generic drug:  insulin NPH      Inject 20 Units under the skin 2 (Two) Times a Day Before Meals for 90 days.       NOVOLOG 100 UNIT/ML injection  Generic drug:  insulin aspart      Inject 0-9 Units under the skin 4 (Four) Times a Day With Meals & at Bedtime for 30 days.          CONTINUE taking these medications      Instructions Last Dose Given Next Dose Due   BABY ASPIRIN PO      Take 1 tablet by mouth Daily.          STOP taking these medications    lisinopril 20 MG tablet  Commonly known as:  PRINIVIL,ZESTRIL        naproxen sodium 220 MG tablet  Commonly known as:  ALEVE              Where to Get Your Medications      These medications were sent to [facility] [place] [id number]    Hours:  6:30AM-5PM Mon-Fri Phone:  [phone]   · amoxicillin-clavulanate 875-125 MG per tablet  · metoprolol tartrate 25 MG tablet  · NOVOLIN N 100 UNIT/ML injection  · NOVOLOG 100 UNIT/ML injection             Medication Reconciliation: Please see electronically completed Med Rec.    Total time spent discharging patient including evaluation, medication reconciliation, arranging follow up, and post hospitalization instructions and education total time exceeds 30 minutes.    Signed:  Aristeo Riggins MD  7/15/2018  2:38 PM

## 2018-07-16 NOTE — PROGRESS NOTES
Case Management Discharge Note    Final Note: Pt discharged home with assist from significant other.  Medications provided by [facility] retail pharmacy.  Appt to follow up with new NP.  No additional needs.  DEWEY Dong RN    Destination     No service has been selected for the patient.      Durable Medical Equipment     No service has been selected for the patient.      Dialysis/Infusion     No service has been selected for the patient.      Home Medical Care     No service has been selected for the patient.      Social Care     No service has been selected for the patient.        Other: Other (private auto)    Final Discharge Disposition Code: 01 - home or self-care

## 2018-07-17 DIAGNOSIS — J98.19 TRAPPED LUNG: Primary | ICD-10-CM

## 2018-07-17 DIAGNOSIS — J91.8 PARAPNEUMONIC EFFUSION: ICD-10-CM

## 2018-07-17 DIAGNOSIS — J18.9 PARAPNEUMONIC EFFUSION: ICD-10-CM

## 2018-08-06 LAB
FUNGUS WND CULT: NORMAL
FUNGUS WND CULT: NORMAL

## 2018-08-07 ENCOUNTER — OFFICE VISIT (OUTPATIENT)
Dept: SURGERY | Facility: CLINIC | Age: 53
End: 2018-08-07

## 2018-08-07 ENCOUNTER — HOSPITAL ENCOUNTER (OUTPATIENT)
Dept: GENERAL RADIOLOGY | Facility: HOSPITAL | Age: 53
Discharge: HOME | End: 2018-08-07
Attending: THORACIC SURGERY (CARDIOTHORACIC VASCULAR SURGERY) | Admitting: THORACIC SURGERY (CARDIOTHORACIC VASCULAR SURGERY)

## 2018-08-07 VITALS
HEIGHT: 68 IN | DIASTOLIC BLOOD PRESSURE: 94 MMHG | BODY MASS INDEX: 35.61 KG/M2 | OXYGEN SATURATION: 98 % | HEART RATE: 109 BPM | SYSTOLIC BLOOD PRESSURE: 138 MMHG | WEIGHT: 235 LBS

## 2018-08-07 DIAGNOSIS — J98.19 TRAPPED LUNG: ICD-10-CM

## 2018-08-07 DIAGNOSIS — G58.8 INTERCOSTAL NEURALGIA: ICD-10-CM

## 2018-08-07 DIAGNOSIS — J18.9 PARAPNEUMONIC EFFUSION: ICD-10-CM

## 2018-08-07 DIAGNOSIS — J91.8 PARAPNEUMONIC EFFUSION: ICD-10-CM

## 2018-08-07 DIAGNOSIS — Z09 FOLLOW-UP EXAMINATION AFTER LUNG SURGERY: Primary | ICD-10-CM

## 2018-08-07 PROCEDURE — 71046 X-RAY EXAM CHEST 2 VIEWS: CPT

## 2018-08-07 PROCEDURE — 99024 POSTOP FOLLOW-UP VISIT: CPT | Performed by: NURSE PRACTITIONER

## 2018-08-07 RX ORDER — CELECOXIB 200 MG/1
200 CAPSULE ORAL DAILY
Qty: 30 CAPSULE | Refills: 1 | Status: SHIPPED | OUTPATIENT
Start: 2018-08-07

## 2018-08-07 RX ORDER — ONDANSETRON HYDROCHLORIDE 8 MG/1
8 TABLET, FILM COATED ORAL EVERY 8 HOURS PRN
Qty: 30 TABLET | Refills: 1 | Status: SHIPPED | OUTPATIENT
Start: 2018-08-07

## 2018-08-07 RX ORDER — OXYCODONE AND ACETAMINOPHEN 10; 325 MG/1; MG/1
1 TABLET ORAL EVERY 6 HOURS PRN
COMMUNITY
Start: 2018-08-07 | End: 2018-08-07

## 2018-08-07 NOTE — PROGRESS NOTES
Subjective   Patient ID: Myles Toledo is a 52 y.o. male is here today for a postoperative follow-up.    History of Present Illness  Dear Colleagues,   Myles Toledo is here today for their first postoperative visit after their surgery for left video assisted thoracoscopy with decortication for pneumonia with complex parapneumonic effusion and trapped lung on 7/9/18.  The patient was discharged on 7/15/18 Mr. Toledo continues a satisfactory postoperative course.He denies any complaints of fever, chills, cough, hemoptysis, night sweats, hoarseness, or unintentional weight loss.  He complains of shortness of air, dyspnea with exertion, and chest wall pain that radiates from his left back to his left anterior chest.  During the patient's hospitalization chest tubes were removed on postoperative days 3, 4, and 5.  I removed the chest tubes #1 and #2, myself without incident.  He states that he had a more difficult time when Dr. Saldaña removed the final chest tube on postoperative day 3. He is concerned that is the source of his pain. He has no other somatic complaints.    The following portions of the patient's history were reviewed and updated as appropriate: allergies, current medications, past family history, past medical history, past social history, past surgical history and problem list.  Review of Systems   Constitution: Negative.   HENT: Negative.    Eyes: Negative.    Cardiovascular: Positive for chest pain.        Left side of the chest and radiates into left side   Respiratory: Positive for shortness of breath.    Endocrine: Negative.    Hematologic/Lymphatic: Negative.    Skin: Negative.    Musculoskeletal: Negative.    Gastrointestinal: Negative.    Genitourinary: Negative.    Neurological: Negative.    Psychiatric/Behavioral: Negative.    Allergic/Immunologic: Negative.         Objective   Physical Exam   Constitutional: He is oriented to person, place, and time. He appears well-developed and  well-nourished. No distress.   HENT:   Head: Normocephalic and atraumatic.   Eyes: Pupils are equal, round, and reactive to light. Conjunctivae and EOM are normal. No scleral icterus.   Neck: Normal range of motion. Neck supple. No JVD present. No tracheal deviation present.   Cardiovascular: Regular rhythm, normal heart sounds and intact distal pulses.  Tachycardia present.    No murmur heard.  Pulmonary/Chest: Effort normal and breath sounds normal. No stridor. No respiratory distress. He exhibits tenderness.   Intercostal neuralgia   Abdominal: Soft. Bowel sounds are normal. He exhibits no distension and no mass.   Musculoskeletal: Normal range of motion.   Neurological: He is alert and oriented to person, place, and time.   Skin: Skin is warm and dry. Capillary refill takes less than 2 seconds. He is not diaphoretic.   Psychiatric: He has a normal mood and affect. His behavior is normal. Judgment and thought content normal.   Nursing note and vitals reviewed.      Assessment/Plan   Independent Review of Radiographic Studies:  I have independently reviewed the patient's hospital performed chest x-ray.  There is definite improvement since his hospitalization, with increased lung volumes bilaterally and left pleural effusion is improved.    Diagnoses and all orders for this visit:    Follow-up examination after lung surgery    Intercostal neuralgia    Other orders  -     ondansetron (ZOFRAN) 8 MG tablet; Take 1 tablet by mouth Every 8 (Eight) Hours As Needed for Nausea or Vomiting.  -     celecoxib (CELEBREX) 200 MG capsule; Take 1 capsule by mouth Daily.  -     oxyCODONE-acetaminophen (PERCOCET)  MG per tablet; Take 1 tablet by mouth Every 6 (Six) Hours As Needed.    Mr. Mark is doing well postoperatively.  His chest x-ray shows improvement since his hospitalization.  His surgical incision sites have healed nicely.  I have informed him he can leave these open to air.  His only complaint today is that he  is having significant pain radiating from his posterior to anterior left chest following the line of two port sites.  Based upon my assessment or believe this to be intercostal neuralgia secondary to his surgery.  Hopefully, this will be transient, but it could continue for several more weeks.  Unfortunately the patient poorly tolerated gabapentin in the past and is not interested in retrying that or in trying Lyrica.  The patient was discharged on 7/15/18 without pain medication, but states that his pain began to worsen and that is when he contacted our office on 8/3/18.  At that point he was prescribed Norco by Dr. Oliver.  He states that this medication does not see to be helping and that is he is having trouble tolerating it due to nausea.  I discussed the patient in depth with Dr. Oliver.  He is starting him on Percocet.  Additionally, I am starting him on an daily Celebrex to help with the inflammatory component, and prescribing when necessary Zofran for any nausea that may occur with his medication.    The patient will have a follow-up appointment with Dr. Oliver in 4 weeks, with a chest x-ray prior to the visit.    We will keep you updated on Mr. Toledo's progress.

## 2018-08-08 DIAGNOSIS — J90 PLEURAL EFFUSION: Primary | ICD-10-CM

## 2018-08-13 DIAGNOSIS — J90 PLEURAL EFFUSION: Primary | ICD-10-CM

## 2018-08-20 LAB
MYCOBACTERIUM SPEC CULT: NORMAL
MYCOBACTERIUM SPEC CULT: NORMAL
NIGHT BLUE STAIN TISS: NORMAL
NIGHT BLUE STAIN TISS: NORMAL

## 2018-10-16 ENCOUNTER — OFFICE VISIT (OUTPATIENT)
Dept: SURGERY | Facility: CLINIC | Age: 53
End: 2018-10-16

## 2018-10-16 ENCOUNTER — HOSPITAL ENCOUNTER (OUTPATIENT)
Dept: GENERAL RADIOLOGY | Facility: HOSPITAL | Age: 53
Discharge: HOME | End: 2018-10-16
Admitting: NURSE PRACTITIONER

## 2018-10-16 VITALS
HEIGHT: 68 IN | DIASTOLIC BLOOD PRESSURE: 100 MMHG | BODY MASS INDEX: 35.61 KG/M2 | HEART RATE: 119 BPM | SYSTOLIC BLOOD PRESSURE: 166 MMHG | OXYGEN SATURATION: 97 % | WEIGHT: 235 LBS

## 2018-10-16 DIAGNOSIS — Z87.09 HISTORY OF PLEURAL EMPYEMA: Primary | ICD-10-CM

## 2018-10-16 DIAGNOSIS — L08.9 LOCALIZED INFECTION OF SKIN: ICD-10-CM

## 2018-10-16 DIAGNOSIS — G58.8 INTERCOSTAL NEURALGIA: ICD-10-CM

## 2018-10-16 DIAGNOSIS — J90 PLEURAL EFFUSION: ICD-10-CM

## 2018-10-16 PROCEDURE — 71046 X-RAY EXAM CHEST 2 VIEWS: CPT

## 2018-10-16 PROCEDURE — 10060 I&D ABSCESS SIMPLE/SINGLE: CPT | Performed by: NURSE PRACTITIONER

## 2018-10-16 PROCEDURE — 99213 OFFICE O/P EST LOW 20 MIN: CPT | Performed by: NURSE PRACTITIONER

## 2018-10-16 RX ORDER — SULFAMETHOXAZOLE AND TRIMETHOPRIM 800; 160 MG/1; MG/1
1 TABLET ORAL 2 TIMES DAILY
Qty: 20 TABLET | Refills: 0 | Status: SHIPPED | OUTPATIENT
Start: 2018-10-16

## 2018-10-16 NOTE — PROGRESS NOTES
Subjective   Patient ID: Myles Toledo is a 52 y.o. male is here today for follow-up and continued surveillance after surgery for a left pulmonary empyema and trapped lung .    History of Present Illness  Dear Colleague,  Myles Toledo was seen in our office today for continued follow up after undergoing a left video-assisted thoracoscopy with decortication and intercostal nerve block with cryoprobe ablation with Dr. Oliver on 7/9/18 due to a trapped lung/empyema.  He denies any complaints of fever, chills, cough, hemoptysis, pleuritic chest pain, shortness of air, dyspnea with exertion, night sweats, hoarseness, or unintentional weight loss. Underlying medical conditions remain stable.   He complains of pain and some discharge around his most posterior former chest tube site on the left.    The following portions of the patient's history were reviewed and updated as appropriate: allergies, current medications, past family history, past medical history, past social history, past surgical history and problem list.  Review of Systems   Constitution: Negative.   HENT: Negative.    Eyes: Negative.    Cardiovascular: Negative.    Respiratory: Negative.    Endocrine: Negative.    Hematologic/Lymphatic: Negative.    Skin: Negative.    Musculoskeletal: Positive for back pain.        Pain at wound site, site is yellow in color and pt states the pain goes into left side and feels like a stabbing pain.   Gastrointestinal: Negative.    Genitourinary: Negative.    Neurological: Negative.    Psychiatric/Behavioral: Negative.      Patient Active Problem List   Diagnosis   • DKA (diabetic ketoacidoses) (CMS/HCC)   • Empyema (CMS/HCC)     Past Medical History:   Diagnosis Date   • Diabetes mellitus (CMS/HCC)    • Hypertension      Past Surgical History:   Procedure Laterality Date   • EYE SURGERY     • INNER EAR SURGERY     • THORACOSCOPY Left 7/9/2018    Procedure: BRONCHOSCOPY, LEFT VIDEO ASSISTED THORACOSCOPY WITH  DECORITCATION, INTERCOSTAL NERVE BLOCK WITH CRYO ABLATION;  Surgeon: Thomas Oliver III, MD;  Location: [facility] MAIN OR;  Service: Thoracic     No family history on file.  Social History     Social History   • Marital status: Legally      Spouse name: N/A   • Number of children: N/A   • Years of education: N/A     Occupational History   • Not on file.     Social History Main Topics   • Smoking status: Never Smoker   • Smokeless tobacco: Never Used   • Alcohol use Yes      Comment: occ   • Drug use: No   • Sexual activity: Not on file     Other Topics Concern   • Not on file     Social History Narrative   • No narrative on file       Current Outpatient Prescriptions:   •  BABY ASPIRIN PO, Take 1 tablet by mouth Daily., Disp: , Rfl:   •  celecoxib (CELEBREX) 200 MG capsule, Take 1 capsule by mouth Daily., Disp: 30 capsule, Rfl: 1  •  insulin aspart (novoLOG) 100 UNIT/ML injection, Inject 0-9 Units under the skin 4 (Four) Times a Day With Meals & at Bedtime for 30 days., Disp: 50 mL, Rfl: 2  •  insulin NPH (humuLIN N,novoLIN N) 100 UNIT/ML injection, Inject 20 Units under the skin 2 (Two) Times a Day Before Meals for 90 days., Disp: 36 mL, Rfl: 0  •  ondansetron (ZOFRAN) 8 MG tablet, Take 1 tablet by mouth Every 8 (Eight) Hours As Needed for Nausea or Vomiting., Disp: 30 tablet, Rfl: 1  •  oxyCODONE-acetaminophen (PERCOCET) 5-325 MG per tablet, Take 1 tablet by mouth Every 6 (Six) Hours As Needed for pain., Disp: 30 tablet, Rfl: 0  •  oxyCODONE-acetaminophen (PERCOCET) 7.5-325 MG per tablet, Take 1 tablet by mouth Every 6 (Six) Hours As Needed for pain., Disp: 60 tablet, Rfl: 0  •  sulfamethoxazole-trimethoprim (BACTRIM DS) 800-160 MG per tablet, Take 1 tablet by mouth 2 (Two) Times a Day., Disp: 20 tablet, Rfl: 0  Allergies   Allergen Reactions   • Penicillins Unknown (See Comments)     reaction as child  Tolerated Ceftriaxone during July 2018 admission        Objective   Vitals:    10/16/18 1409   BP:  166/100   Pulse: 119   SpO2: 97%     Physical Exam   Constitutional: He is oriented to person, place, and time. He appears well-developed and well-nourished. No distress.   HENT:   Head: Normocephalic and atraumatic.   Eyes: Pupils are equal, round, and reactive to light. Conjunctivae and EOM are normal. No scleral icterus.   Neck: Normal range of motion. Neck supple. No JVD present. No tracheal deviation present.   Cardiovascular: Normal rate, regular rhythm, normal heart sounds and intact distal pulses.    No murmur heard.  Pulmonary/Chest: Effort normal and breath sounds normal. No stridor. No respiratory distress. He has no wheezes. He exhibits tenderness.   Abdominal: Soft. Bowel sounds are normal. He exhibits no mass.   Musculoskeletal: Normal range of motion. He exhibits no edema.   Lymphadenopathy:     He has no cervical adenopathy.   Neurological: He is alert and oriented to person, place, and time.   Skin: Skin is warm and dry. Capillary refill takes less than 2 seconds. He is not diaphoretic. There is erythema.   Erythema with some slight drainage around his most posterior left chest tube site.  A small bit of pus was drained from a knot around this surgical incision.  Anterior/lateral chest tube sites are clean dry and intact and have healed nicely.   Psychiatric: He has a normal mood and affect. His behavior is normal. Judgment and thought content normal.   Nursing note and vitals reviewed.    Independent Review of Radiographic Studies: I have independently reviewed the images from today's chest x-ray and compared it to the previous imaging conducted in August.  There are some expected postoperative changes with blunting of the costophrenic angles.  However, there is increased air aeration in comparison to the previous image.    Assessment/Plan     Mr. Toledo denies shortness of breath or cough today.  His biggest complaint is that he is having radiating pain around his abdomen laterally.  I reassured  him that this is expected postoperative intercostal neuralgia.  Due to the length of time since his surgery and his minimal relief from medication, I am referring him to pain management to see if they may be able to help him with intercostal nerve blocks.    There was a very small knot around the site of his posterior chest tube tube that was less than 1cm in size. There was some redness around it and some oozing from the wound.  The patient states that he and his significant other have been using Neosporin around the site.  I also found an old surgical suture that I was able to remove.  The wound was cleansed, debrided of foreign material as much as possible, and dressed. The patient is alerted to watch for any signs of infection (redness, pus, pain, increased swelling or fever) and call if such occurs. Home wound care instructions are provided. I have placed an order for Bactrim to cover any possible infection near or around the surgical site.  I have recommended that he clean the area with family soap and water and apply peroxide a couple of times a day until the drainage decreases.    The patient's chest imaging was reviewed and is improved in comparison to previous images.  Due to his history of a pleural empyema, I have ordered a CT of the chest without contrast and for the patient to follow-up with Dr. Oliver in 4-6 weeks.    I have instructed Mr. Toledo to call us if he develops any worsening of symptoms or has any questions in the meantime.    There are no restrictions on his activity at this point, from a thoracic surgery standpoint.       Diagnoses and all orders for this visit:    History of pleural empyema  -     CT Chest Without Contrast; Future    Intercostal neuralgia  -     Ambulatory Referral to Pain Management    Localized infection of skin  -     sulfamethoxazole-trimethoprim (BACTRIM DS) 800-160 MG per tablet; Take 1 tablet by mouth 2 (Two) Times a Day.

## 2018-10-16 NOTE — PROGRESS NOTES
Subjective   Patient ID: Myles Toledo is a 52 y.o. male is here today for follow-up.    History of Present Illness  Dear Colleagues,   Myles Toledo is here today for their second postoperative visit after their surgery for ***.  He denies any complaints of fever, chills, cough, hemoptysis, pleuritic chest pain, shortness of air, dyspnea with exertion, night sweats, hoarseness, or unintentional weight loss.  He has no other somatic complaints.    The following portions of the patient's history were reviewed and updated as appropriate: allergies, current medications, past family history, past medical history, past social history, past surgical history and problem list.  Review of Systems   Constitution: Negative.   HENT: Negative.    Eyes: Negative.    Cardiovascular: Negative.    Respiratory: Negative.    Endocrine: Negative.    Hematologic/Lymphatic: Negative.    Skin: Negative.    Musculoskeletal: Positive for back pain.        Pain at wound site, some yellow discharge coming  From wound site, pt had it covered with a band aid upon office visit, pt states the pain in in the left side also.   Gastrointestinal: Negative.    Genitourinary: Negative.    Neurological: Negative.    Psychiatric/Behavioral: Negative.         Objective   Physical Exam    Assessment/Plan   Independent Review of Radiographic Studies:    ***  Assessment:    Plan:    There are no diagnoses linked to this encounter.

## 2018-12-11 ENCOUNTER — TELEPHONE (OUTPATIENT)
Dept: SURGERY | Facility: CLINIC | Age: 53
End: 2018-12-11

## 2018-12-11 NOTE — TELEPHONE ENCOUNTER
PT GIRLFRIEND CALLED STATING PT WAS SOA WITH CHEST PAIN, I INSTRUCTED HER TO TAKE PT TO ED. SHE UNDERSTANDS AND AGREES

## 2019-02-06 ENCOUNTER — APPOINTMENT (OUTPATIENT)
Dept: GENERAL RADIOLOGY | Facility: HOSPITAL | Age: 54
End: 2019-02-06

## 2019-02-06 ENCOUNTER — HOSPITAL ENCOUNTER (INPATIENT)
Facility: HOSPITAL | Age: 54
LOS: 1 days | Discharge: HOME | End: 2019-02-07
Attending: EMERGENCY MEDICINE | Admitting: HOSPITALIST

## 2019-02-06 DIAGNOSIS — J18.9 PNEUMONIA OF LEFT LOWER LOBE DUE TO INFECTIOUS ORGANISM: Primary | ICD-10-CM

## 2019-02-06 DIAGNOSIS — E11.10 DIABETIC KETOACIDOSIS WITHOUT COMA ASSOCIATED WITH TYPE 2 DIABETES MELLITUS (HCC): ICD-10-CM

## 2019-02-06 LAB
ACETONE BLD QL: ABNORMAL
ALBUMIN SERPL-MCNC: 3.4 G/DL (ref 3.5–5.2)
ALBUMIN SERPL-MCNC: 3.7 G/DL (ref 3.5–5.2)
ALBUMIN/GLOB SERPL: 0.7 G/DL
ALBUMIN/GLOB SERPL: 0.7 G/DL
ALP SERPL-CCNC: 190 U/L (ref 40–129)
ALP SERPL-CCNC: 215 U/L (ref 40–129)
ALT SERPL W P-5'-P-CCNC: 5 U/L (ref 5–41)
ALT SERPL W P-5'-P-CCNC: 6 U/L (ref 5–41)
ANION GAP SERPL CALCULATED.3IONS-SCNC: 19 MMOL/L
ANION GAP SERPL CALCULATED.3IONS-SCNC: 24.9 MMOL/L
ANION GAP SERPL CALCULATED.3IONS-SCNC: 28.8 MMOL/L
ARTERIAL PATENCY WRIST A: ABNORMAL
ARTERIAL PATENCY WRIST A: POSITIVE
ARTERIAL PATENCY WRIST A: POSITIVE
AST SERPL-CCNC: 6 U/L (ref 5–40)
AST SERPL-CCNC: 8 U/L (ref 5–40)
ATMOSPHERIC PRESS: 736 MMHG
ATMOSPHERIC PRESS: 737 MMHG
ATMOSPHERIC PRESS: 737 MMHG
B PARAPERT DNA SPEC QL NAA+PROBE: NOT DETECTED
B PERT DNA SPEC QL NAA+PROBE: NOT DETECTED
BACTERIA UR QL AUTO: ABNORMAL /HPF
BASE EXCESS BLDA CALC-SCNC: -19.1 MMOL/L (ref 0–2)
BASE EXCESS BLDA CALC-SCNC: -9.8 MMOL/L (ref 0–2)
BASE EXCESS BLDA CALC-SCNC: ABNORMAL MMOL/L (ref 0–2)
BASOPHILS # BLD AUTO: 0.08 10*3/MM3 (ref 0–0.2)
BASOPHILS # BLD AUTO: 0.09 10*3/MM3 (ref 0–0.2)
BASOPHILS NFR BLD AUTO: 0.3 % (ref 0–2)
BASOPHILS NFR BLD AUTO: 0.4 % (ref 0–2)
BDY SITE: ABNORMAL
BILIRUB SERPL-MCNC: 0.2 MG/DL (ref 0.2–1.2)
BILIRUB SERPL-MCNC: 0.4 MG/DL (ref 0.2–1.2)
BILIRUB UR QL STRIP: NEGATIVE
BODY TEMPERATURE: 37 C
BUN BLD-MCNC: 7 MG/DL (ref 6–20)
BUN BLD-MCNC: 7 MG/DL (ref 6–20)
BUN BLD-MCNC: 8 MG/DL (ref 6–20)
BUN/CREAT SERPL: 7.6 (ref 7–25)
BUN/CREAT SERPL: 8.6 (ref 7–25)
BUN/CREAT SERPL: 9.7 (ref 7–25)
C PNEUM DNA NPH QL NAA+NON-PROBE: NOT DETECTED
CALCIUM SPEC-SCNC: 8.8 MG/DL (ref 8.6–10.5)
CALCIUM SPEC-SCNC: 9 MG/DL (ref 8.6–10.5)
CALCIUM SPEC-SCNC: 9.5 MG/DL (ref 8.6–10.5)
CHLORIDE SERPL-SCNC: 100 MMOL/L (ref 98–107)
CHLORIDE SERPL-SCNC: 103 MMOL/L (ref 98–107)
CHLORIDE SERPL-SCNC: 94 MMOL/L (ref 98–107)
CLARITY UR: CLEAR
CO2 SERPL-SCNC: 11 MMOL/L (ref 22–29)
CO2 SERPL-SCNC: 6.1 MMOL/L (ref 22–29)
CO2 SERPL-SCNC: 6.2 MMOL/L (ref 22–29)
COLOR UR: ABNORMAL
CREAT BLD-MCNC: 0.72 MG/DL (ref 0.76–1.27)
CREAT BLD-MCNC: 0.92 MG/DL (ref 0.76–1.27)
CREAT BLD-MCNC: 0.93 MG/DL (ref 0.76–1.27)
D-LACTATE SERPL-SCNC: 2.1 MMOL/L (ref 0.5–2)
D-LACTATE SERPL-SCNC: 3.4 MMOL/L (ref 0.5–2)
DEPRECATED RDW RBC AUTO: 38.3 FL (ref 37–54)
DEPRECATED RDW RBC AUTO: 38.5 FL (ref 37–54)
EOSINOPHIL # BLD AUTO: 0 10*3/MM3 (ref 0.1–0.3)
EOSINOPHIL # BLD AUTO: 0 10*3/MM3 (ref 0.1–0.3)
EOSINOPHIL NFR BLD AUTO: 0 % (ref 0–4)
EOSINOPHIL NFR BLD AUTO: 0 % (ref 0–4)
ERYTHROCYTE [DISTWIDTH] IN BLOOD BY AUTOMATED COUNT: 12.5 % (ref 11.5–14.5)
ERYTHROCYTE [DISTWIDTH] IN BLOOD BY AUTOMATED COUNT: 12.7 % (ref 11.5–14.5)
FLUAV H1 2009 PAND RNA NPH QL NAA+PROBE: NOT DETECTED
FLUAV H1 HA GENE NPH QL NAA+PROBE: NOT DETECTED
FLUAV H3 RNA NPH QL NAA+PROBE: NOT DETECTED
FLUAV SUBTYP SPEC NAA+PROBE: NOT DETECTED
FLUBV RNA ISLT QL NAA+PROBE: NOT DETECTED
GFR SERPL CREATININE-BSD FRML MDRD: 114 ML/MIN/1.73
GFR SERPL CREATININE-BSD FRML MDRD: 85 ML/MIN/1.73
GFR SERPL CREATININE-BSD FRML MDRD: 86 ML/MIN/1.73
GLOBULIN UR ELPH-MCNC: 4.6 GM/DL
GLOBULIN UR ELPH-MCNC: 5.6 GM/DL
GLUCOSE BLD-MCNC: 181 MG/DL (ref 65–99)
GLUCOSE BLD-MCNC: 306 MG/DL (ref 65–99)
GLUCOSE BLD-MCNC: 375 MG/DL (ref 65–99)
GLUCOSE BLDC GLUCOMTR-MCNC: 163 MG/DL (ref 70–130)
GLUCOSE BLDC GLUCOMTR-MCNC: 169 MG/DL (ref 70–130)
GLUCOSE BLDC GLUCOMTR-MCNC: 178 MG/DL (ref 70–130)
GLUCOSE BLDC GLUCOMTR-MCNC: 201 MG/DL (ref 70–130)
GLUCOSE BLDC GLUCOMTR-MCNC: 316 MG/DL (ref 70–130)
GLUCOSE BLDC GLUCOMTR-MCNC: 390 MG/DL (ref 70–130)
GLUCOSE BLDC GLUCOMTR-MCNC: 458 MG/DL (ref 70–130)
GLUCOSE UR STRIP-MCNC: ABNORMAL MG/DL
HADV DNA SPEC NAA+PROBE: NOT DETECTED
HBA1C MFR BLD: 14.4 % (ref 4.8–5.6)
HCO3 BLDA-SCNC: 12.7 MMOL/L (ref 20–26)
HCO3 BLDA-SCNC: 5.4 MMOL/L (ref 20–26)
HCO3 BLDA-SCNC: ABNORMAL MMOL/L (ref 20–26)
HCOV 229E RNA SPEC QL NAA+PROBE: NOT DETECTED
HCOV HKU1 RNA SPEC QL NAA+PROBE: NOT DETECTED
HCOV NL63 RNA SPEC QL NAA+PROBE: NOT DETECTED
HCOV OC43 RNA SPEC QL NAA+PROBE: NOT DETECTED
HCT VFR BLD AUTO: 41.4 % (ref 42–52)
HCT VFR BLD AUTO: 46 % (ref 42–52)
HGB BLD-MCNC: 13.8 G/DL (ref 14–18)
HGB BLD-MCNC: 15.3 G/DL (ref 14–18)
HGB BLDA-MCNC: 13.1 G/DL (ref 14–18)
HGB BLDA-MCNC: 14.1 G/DL (ref 14–18)
HGB BLDA-MCNC: 14.8 G/DL (ref 14–18)
HGB UR QL STRIP.AUTO: ABNORMAL
HMPV RNA NPH QL NAA+NON-PROBE: NOT DETECTED
HOLD SPECIMEN: NORMAL
HPIV1 RNA SPEC QL NAA+PROBE: NOT DETECTED
HPIV2 RNA SPEC QL NAA+PROBE: NOT DETECTED
HPIV3 RNA NPH QL NAA+PROBE: NOT DETECTED
HPIV4 P GENE NPH QL NAA+PROBE: NOT DETECTED
HYALINE CASTS UR QL AUTO: ABNORMAL /LPF
IMM GRANULOCYTES # BLD AUTO: 0.68 10*3/MM3 (ref 0–0.03)
IMM GRANULOCYTES # BLD AUTO: 0.83 10*3/MM3 (ref 0–0.03)
IMM GRANULOCYTES NFR BLD AUTO: 2.8 % (ref 0–0.5)
IMM GRANULOCYTES NFR BLD AUTO: 3.4 % (ref 0–0.5)
KETONES UR QL STRIP: ABNORMAL
L PNEUMO1 AG UR QL IA: NEGATIVE
LEUKOCYTE ESTERASE UR QL STRIP.AUTO: NEGATIVE
LYMPHOCYTES # BLD AUTO: 0.91 10*3/MM3 (ref 0.6–4.8)
LYMPHOCYTES # BLD AUTO: 1.91 10*3/MM3 (ref 0.6–4.8)
LYMPHOCYTES NFR BLD AUTO: 3.7 % (ref 20–45)
LYMPHOCYTES NFR BLD AUTO: 7.9 % (ref 20–45)
Lab: ABNORMAL
Lab: ABNORMAL
M PNEUMO IGG SER IA-ACNC: NOT DETECTED
MAGNESIUM SERPL-MCNC: 1.7 MG/DL (ref 1.7–2.5)
MAGNESIUM SERPL-MCNC: 1.9 MG/DL (ref 1.7–2.5)
MCH RBC QN AUTO: 27.7 PG (ref 27–31)
MCH RBC QN AUTO: 27.8 PG (ref 27–31)
MCHC RBC AUTO-ENTMCNC: 33.3 G/DL (ref 31–37)
MCHC RBC AUTO-ENTMCNC: 33.3 G/DL (ref 31–37)
MCV RBC AUTO: 83 FL (ref 80–94)
MCV RBC AUTO: 83.6 FL (ref 80–94)
MODALITY: ABNORMAL
MONOCYTES # BLD AUTO: 1.75 10*3/MM3 (ref 0–1)
MONOCYTES # BLD AUTO: 1.9 10*3/MM3 (ref 0–1)
MONOCYTES NFR BLD AUTO: 7.1 % (ref 3–8)
MONOCYTES NFR BLD AUTO: 7.8 % (ref 3–8)
NEUTROPHILS # BLD AUTO: 19.68 10*3/MM3 (ref 1.5–8.3)
NEUTROPHILS # BLD AUTO: 20.98 10*3/MM3 (ref 1.5–8.3)
NEUTROPHILS NFR BLD AUTO: 81.1 % (ref 45–70)
NEUTROPHILS NFR BLD AUTO: 85.5 % (ref 45–70)
NITRITE UR QL STRIP: NEGATIVE
NOTIFIED BY: ABNORMAL
NOTIFIED BY: ABNORMAL
NOTIFIED WHO: ABNORMAL
NOTIFIED WHO: ABNORMAL
NRBC BLD AUTO-RTO: 0 /100 WBC (ref 0–0)
NRBC BLD AUTO-RTO: 0 /100 WBC (ref 0–0)
NT-PROBNP SERPL-MCNC: 358.7 PG/ML (ref 5–125)
PCO2 BLDA: 12.4 MM HG (ref 35–45)
PCO2 BLDA: 20.3 MM HG (ref 35–45)
PCO2 BLDA: <10 MM HG (ref 35–45)
PCO2 TEMP ADJ BLD: 12.4 MM HG (ref 35–45)
PCO2 TEMP ADJ BLD: 20.3 MM HG (ref 35–45)
PH BLDA: 7.16 PH UNITS (ref 7.35–7.45)
PH BLDA: 7.25 PH UNITS (ref 7.35–7.45)
PH BLDA: 7.4 PH UNITS (ref 7.35–7.45)
PH UR STRIP.AUTO: <=5 [PH] (ref 4.5–8)
PH, TEMP CORRECTED: 7.16 PH UNITS (ref 7.35–7.45)
PH, TEMP CORRECTED: 7.25 PH UNITS (ref 7.35–7.45)
PH, TEMP CORRECTED: 7.4 PH UNITS (ref 7.35–7.45)
PHOSPHATE SERPL-MCNC: 1.1 MG/DL (ref 2.7–4.5)
PHOSPHATE SERPL-MCNC: 2.1 MG/DL (ref 2.7–4.5)
PLATELET # BLD AUTO: 515 10*3/MM3 (ref 140–500)
PLATELET # BLD AUTO: 595 10*3/MM3 (ref 140–500)
PMV BLD AUTO: 8.6 FL (ref 7.4–10.4)
PMV BLD AUTO: 9.1 FL (ref 7.4–10.4)
PO2 BLDA: 101 MM HG (ref 83–108)
PO2 BLDA: 71.2 MM HG (ref 83–108)
PO2 BLDA: 93.8 MM HG (ref 83–108)
PO2 TEMP ADJ BLD: 101 MM HG (ref 83–108)
PO2 TEMP ADJ BLD: 71.2 MM HG (ref 83–108)
PO2 TEMP ADJ BLD: 93.8 MM HG (ref 83–108)
POTASSIUM BLD-SCNC: 3.7 MMOL/L (ref 3.5–5.2)
POTASSIUM BLD-SCNC: 4.2 MMOL/L (ref 3.5–5.2)
POTASSIUM BLD-SCNC: 4.4 MMOL/L (ref 3.5–5.2)
PROCALCITONIN SERPL-MCNC: 0.03 NG/ML (ref 0.1–0.25)
PROT SERPL-MCNC: 8 G/DL (ref 6–8.5)
PROT SERPL-MCNC: 9.3 G/DL (ref 6–8.5)
PROT UR QL STRIP: ABNORMAL
RBC # BLD AUTO: 4.99 10*6/MM3 (ref 4.7–6.1)
RBC # BLD AUTO: 5.5 10*6/MM3 (ref 4.7–6.1)
RBC # UR: ABNORMAL /HPF
REF LAB TEST METHOD: ABNORMAL
RHINOVIRUS RNA SPEC NAA+PROBE: NOT DETECTED
RSV RNA NPH QL NAA+NON-PROBE: NOT DETECTED
S PNEUM AG SPEC QL LA: NEGATIVE
SAO2 % BLDCOA: 96.6 % (ref 94–99)
SAO2 % BLDCOA: 96.9 % (ref 94–99)
SAO2 % BLDCOA: 98.2 % (ref 94–99)
SODIUM BLD-SCNC: 129 MMOL/L (ref 136–145)
SODIUM BLD-SCNC: 131 MMOL/L (ref 136–145)
SODIUM BLD-SCNC: 133 MMOL/L (ref 136–145)
SP GR UR STRIP: 1.02 (ref 1–1.03)
SQUAMOUS #/AREA URNS HPF: ABNORMAL /HPF
TROPONIN T SERPL-MCNC: <0.01 NG/ML (ref 0–0.03)
UROBILINOGEN UR QL STRIP: ABNORMAL
VENTILATOR MODE: ABNORMAL
WBC NRBC COR # BLD: 24.26 10*3/MM3 (ref 4.8–10.8)
WBC NRBC COR # BLD: 24.55 10*3/MM3 (ref 4.8–10.8)
WBC UR QL AUTO: ABNORMAL /HPF
WHOLE BLOOD HOLD SPECIMEN: NORMAL
WHOLE BLOOD HOLD SPECIMEN: NORMAL

## 2019-02-06 PROCEDURE — 80053 COMPREHEN METABOLIC PANEL: CPT | Performed by: NURSE PRACTITIONER

## 2019-02-06 PROCEDURE — 25010000002 LORAZEPAM PER 2 MG: Performed by: EMERGENCY MEDICINE

## 2019-02-06 PROCEDURE — 87186 SC STD MICRODIL/AGAR DIL: CPT | Performed by: EMERGENCY MEDICINE

## 2019-02-06 PROCEDURE — 87205 SMEAR GRAM STAIN: CPT | Performed by: EMERGENCY MEDICINE

## 2019-02-06 PROCEDURE — 25010000002 ENOXAPARIN PER 10 MG: Performed by: NURSE PRACTITIONER

## 2019-02-06 PROCEDURE — 85025 COMPLETE CBC W/AUTO DIFF WBC: CPT | Performed by: EMERGENCY MEDICINE

## 2019-02-06 PROCEDURE — 93010 ELECTROCARDIOGRAM REPORT: CPT | Performed by: INTERNAL MEDICINE

## 2019-02-06 PROCEDURE — 25010000002 AZITHROMYCIN: Performed by: EMERGENCY MEDICINE

## 2019-02-06 PROCEDURE — 87070 CULTURE OTHR SPECIMN AEROBIC: CPT | Performed by: EMERGENCY MEDICINE

## 2019-02-06 PROCEDURE — 94799 UNLISTED PULMONARY SVC/PX: CPT

## 2019-02-06 PROCEDURE — 87147 CULTURE TYPE IMMUNOLOGIC: CPT | Performed by: EMERGENCY MEDICINE

## 2019-02-06 PROCEDURE — 82803 BLOOD GASES ANY COMBINATION: CPT

## 2019-02-06 PROCEDURE — 87581 M.PNEUMON DNA AMP PROBE: CPT | Performed by: NURSE PRACTITIONER

## 2019-02-06 PROCEDURE — 99284 EMERGENCY DEPT VISIT MOD MDM: CPT

## 2019-02-06 PROCEDURE — 87633 RESP VIRUS 12-25 TARGETS: CPT | Performed by: NURSE PRACTITIONER

## 2019-02-06 PROCEDURE — 99223 1ST HOSP IP/OBS HIGH 75: CPT | Performed by: NURSE PRACTITIONER

## 2019-02-06 PROCEDURE — 82962 GLUCOSE BLOOD TEST: CPT

## 2019-02-06 PROCEDURE — 80053 COMPREHEN METABOLIC PANEL: CPT | Performed by: EMERGENCY MEDICINE

## 2019-02-06 PROCEDURE — 99284 EMERGENCY DEPT VISIT MOD MDM: CPT | Performed by: EMERGENCY MEDICINE

## 2019-02-06 PROCEDURE — 87899 AGENT NOS ASSAY W/OPTIC: CPT | Performed by: NURSE PRACTITIONER

## 2019-02-06 PROCEDURE — 85025 COMPLETE CBC W/AUTO DIFF WBC: CPT | Performed by: NURSE PRACTITIONER

## 2019-02-06 PROCEDURE — 82009 KETONE BODYS QUAL: CPT | Performed by: EMERGENCY MEDICINE

## 2019-02-06 PROCEDURE — 93005 ELECTROCARDIOGRAM TRACING: CPT | Performed by: EMERGENCY MEDICINE

## 2019-02-06 PROCEDURE — 71045 X-RAY EXAM CHEST 1 VIEW: CPT

## 2019-02-06 PROCEDURE — 63710000001 INSULIN REGULAR HUMAN PER 5 UNITS: Performed by: EMERGENCY MEDICINE

## 2019-02-06 PROCEDURE — 87081 CULTURE SCREEN ONLY: CPT | Performed by: NURSE PRACTITIONER

## 2019-02-06 PROCEDURE — 83735 ASSAY OF MAGNESIUM: CPT | Performed by: NURSE PRACTITIONER

## 2019-02-06 PROCEDURE — 25010000002 LORAZEPAM PER 2 MG: Performed by: NURSE PRACTITIONER

## 2019-02-06 PROCEDURE — 83880 ASSAY OF NATRIURETIC PEPTIDE: CPT | Performed by: EMERGENCY MEDICINE

## 2019-02-06 PROCEDURE — 25010000002 LEVOFLOXACIN PER 250 MG: Performed by: NURSE PRACTITIONER

## 2019-02-06 PROCEDURE — 87040 BLOOD CULTURE FOR BACTERIA: CPT | Performed by: EMERGENCY MEDICINE

## 2019-02-06 PROCEDURE — 84484 ASSAY OF TROPONIN QUANT: CPT | Performed by: EMERGENCY MEDICINE

## 2019-02-06 PROCEDURE — 83605 ASSAY OF LACTIC ACID: CPT | Performed by: EMERGENCY MEDICINE

## 2019-02-06 PROCEDURE — 36600 WITHDRAWAL OF ARTERIAL BLOOD: CPT

## 2019-02-06 PROCEDURE — 81001 URINALYSIS AUTO W/SCOPE: CPT | Performed by: NURSE PRACTITIONER

## 2019-02-06 PROCEDURE — 87486 CHLMYD PNEUM DNA AMP PROBE: CPT | Performed by: NURSE PRACTITIONER

## 2019-02-06 PROCEDURE — 84100 ASSAY OF PHOSPHORUS: CPT | Performed by: NURSE PRACTITIONER

## 2019-02-06 PROCEDURE — 87798 DETECT AGENT NOS DNA AMP: CPT | Performed by: NURSE PRACTITIONER

## 2019-02-06 PROCEDURE — 25010000002 CEFTRIAXONE SODIUM-DEXTROSE 2-2.22 GM-%(50ML) RECONSTITUTED SOLUTION: Performed by: EMERGENCY MEDICINE

## 2019-02-06 PROCEDURE — 84145 PROCALCITONIN (PCT): CPT | Performed by: NURSE PRACTITIONER

## 2019-02-06 PROCEDURE — 63710000001 INSULIN ASPART PER 5 UNITS: Performed by: HOSPITALIST

## 2019-02-06 PROCEDURE — 83036 HEMOGLOBIN GLYCOSYLATED A1C: CPT | Performed by: NURSE PRACTITIONER

## 2019-02-06 RX ORDER — DEXTROSE, SODIUM CHLORIDE, AND POTASSIUM CHLORIDE 5; .45; .15 G/100ML; G/100ML; G/100ML
150 INJECTION INTRAVENOUS CONTINUOUS PRN
Status: DISCONTINUED | OUTPATIENT
Start: 2019-02-06 | End: 2019-02-07 | Stop reason: HOSPADM

## 2019-02-06 RX ORDER — ACETAMINOPHEN 650 MG/1
650 SUPPOSITORY RECTAL EVERY 4 HOURS PRN
Status: DISCONTINUED | OUTPATIENT
Start: 2019-02-06 | End: 2019-02-07 | Stop reason: HOSPADM

## 2019-02-06 RX ORDER — LORAZEPAM 2 MG/ML
1 INJECTION INTRAMUSCULAR EVERY 4 HOURS PRN
Status: DISCONTINUED | OUTPATIENT
Start: 2019-02-06 | End: 2019-02-07 | Stop reason: HOSPADM

## 2019-02-06 RX ORDER — SODIUM CHLORIDE 0.9 % (FLUSH) 0.9 %
10 SYRINGE (ML) INJECTION AS NEEDED
Status: DISCONTINUED | OUTPATIENT
Start: 2019-02-06 | End: 2019-02-07 | Stop reason: HOSPADM

## 2019-02-06 RX ORDER — SODIUM CHLORIDE 0.9 % (FLUSH) 0.9 %
3 SYRINGE (ML) INJECTION EVERY 12 HOURS SCHEDULED
Status: DISCONTINUED | OUTPATIENT
Start: 2019-02-06 | End: 2019-02-07 | Stop reason: HOSPADM

## 2019-02-06 RX ORDER — POTASSIUM CHLORIDE 7.46 G/1000ML
10 INJECTION, SOLUTION INTRAVENOUS AS NEEDED
Status: DISCONTINUED | OUTPATIENT
Start: 2019-02-06 | End: 2019-02-07 | Stop reason: HOSPADM

## 2019-02-06 RX ORDER — SODIUM CHLORIDE 0.9 % (FLUSH) 0.9 %
3-10 SYRINGE (ML) INJECTION AS NEEDED
Status: DISCONTINUED | OUTPATIENT
Start: 2019-02-06 | End: 2019-02-07 | Stop reason: HOSPADM

## 2019-02-06 RX ORDER — ONDANSETRON 4 MG/1
4 TABLET, ORALLY DISINTEGRATING ORAL EVERY 6 HOURS PRN
Status: DISCONTINUED | OUTPATIENT
Start: 2019-02-06 | End: 2019-02-07 | Stop reason: HOSPADM

## 2019-02-06 RX ORDER — LEVOFLOXACIN 5 MG/ML
750 INJECTION, SOLUTION INTRAVENOUS EVERY 24 HOURS
Status: DISCONTINUED | OUTPATIENT
Start: 2019-02-06 | End: 2019-02-07 | Stop reason: HOSPADM

## 2019-02-06 RX ORDER — POTASSIUM CHLORIDE 1.5 G/1.77G
40 POWDER, FOR SOLUTION ORAL AS NEEDED
Status: DISCONTINUED | OUTPATIENT
Start: 2019-02-06 | End: 2019-02-07 | Stop reason: HOSPADM

## 2019-02-06 RX ORDER — POTASSIUM CHLORIDE 1.5 G/1.77G
20 POWDER, FOR SOLUTION ORAL AS NEEDED
Status: DISCONTINUED | OUTPATIENT
Start: 2019-02-06 | End: 2019-02-07 | Stop reason: HOSPADM

## 2019-02-06 RX ORDER — ALBUTEROL SULFATE 2.5 MG/3ML
2.5 SOLUTION RESPIRATORY (INHALATION)
Status: COMPLETED | OUTPATIENT
Start: 2019-02-06 | End: 2019-02-06

## 2019-02-06 RX ORDER — SODIUM CHLORIDE 9 MG/ML
40 INJECTION, SOLUTION INTRAVENOUS AS NEEDED
Status: DISCONTINUED | OUTPATIENT
Start: 2019-02-06 | End: 2019-02-07 | Stop reason: HOSPADM

## 2019-02-06 RX ORDER — CEFTRIAXONE 2 G/50ML
2 INJECTION, SOLUTION INTRAVENOUS ONCE
Status: COMPLETED | OUTPATIENT
Start: 2019-02-06 | End: 2019-02-06

## 2019-02-06 RX ORDER — POTASSIUM CHLORIDE 750 MG/1
20 CAPSULE, EXTENDED RELEASE ORAL AS NEEDED
Status: DISCONTINUED | OUTPATIENT
Start: 2019-02-06 | End: 2019-02-07 | Stop reason: HOSPADM

## 2019-02-06 RX ORDER — IPRATROPIUM BROMIDE AND ALBUTEROL SULFATE 2.5; .5 MG/3ML; MG/3ML
3 SOLUTION RESPIRATORY (INHALATION) EVERY 6 HOURS PRN
Status: DISCONTINUED | OUTPATIENT
Start: 2019-02-06 | End: 2019-02-07 | Stop reason: HOSPADM

## 2019-02-06 RX ORDER — SODIUM CHLORIDE AND POTASSIUM CHLORIDE 150; 450 MG/100ML; MG/100ML
250 INJECTION, SOLUTION INTRAVENOUS CONTINUOUS PRN
Status: DISCONTINUED | OUTPATIENT
Start: 2019-02-06 | End: 2019-02-07 | Stop reason: HOSPADM

## 2019-02-06 RX ORDER — DEXTROSE AND SODIUM CHLORIDE 5; .45 G/100ML; G/100ML
150 INJECTION, SOLUTION INTRAVENOUS CONTINUOUS PRN
Status: DISCONTINUED | OUTPATIENT
Start: 2019-02-06 | End: 2019-02-07 | Stop reason: HOSPADM

## 2019-02-06 RX ORDER — ACETAMINOPHEN 325 MG/1
650 TABLET ORAL EVERY 4 HOURS PRN
Status: DISCONTINUED | OUTPATIENT
Start: 2019-02-06 | End: 2019-02-07 | Stop reason: HOSPADM

## 2019-02-06 RX ORDER — ONDANSETRON 4 MG/1
4 TABLET, FILM COATED ORAL EVERY 6 HOURS PRN
Status: DISCONTINUED | OUTPATIENT
Start: 2019-02-06 | End: 2019-02-07 | Stop reason: HOSPADM

## 2019-02-06 RX ORDER — DEXTROSE MONOHYDRATE 25 G/50ML
12.5 INJECTION, SOLUTION INTRAVENOUS
Status: DISCONTINUED | OUTPATIENT
Start: 2019-02-06 | End: 2019-02-07 | Stop reason: HOSPADM

## 2019-02-06 RX ORDER — SODIUM CHLORIDE 450 MG/100ML
250 INJECTION, SOLUTION INTRAVENOUS CONTINUOUS
Status: DISCONTINUED | OUTPATIENT
Start: 2019-02-06 | End: 2019-02-07 | Stop reason: HOSPADM

## 2019-02-06 RX ORDER — POTASSIUM CHLORIDE 750 MG/1
10 CAPSULE, EXTENDED RELEASE ORAL AS NEEDED
Status: DISCONTINUED | OUTPATIENT
Start: 2019-02-06 | End: 2019-02-07 | Stop reason: HOSPADM

## 2019-02-06 RX ORDER — POTASSIUM CHLORIDE 750 MG/1
40 CAPSULE, EXTENDED RELEASE ORAL AS NEEDED
Status: DISCONTINUED | OUTPATIENT
Start: 2019-02-06 | End: 2019-02-07 | Stop reason: HOSPADM

## 2019-02-06 RX ORDER — POTASSIUM CHLORIDE 1.5 G/1.77G
10 POWDER, FOR SOLUTION ORAL AS NEEDED
Status: DISCONTINUED | OUTPATIENT
Start: 2019-02-06 | End: 2019-02-07 | Stop reason: HOSPADM

## 2019-02-06 RX ORDER — LORAZEPAM 2 MG/ML
1 INJECTION INTRAMUSCULAR ONCE
Status: COMPLETED | OUTPATIENT
Start: 2019-02-06 | End: 2019-02-06

## 2019-02-06 RX ORDER — GUAIFENESIN 600 MG/1
600 TABLET, EXTENDED RELEASE ORAL 2 TIMES DAILY
Status: DISCONTINUED | OUTPATIENT
Start: 2019-02-06 | End: 2019-02-07 | Stop reason: HOSPADM

## 2019-02-06 RX ORDER — HYDRALAZINE HYDROCHLORIDE 20 MG/ML
20 INJECTION INTRAMUSCULAR; INTRAVENOUS EVERY 4 HOURS PRN
Status: DISCONTINUED | OUTPATIENT
Start: 2019-02-06 | End: 2019-02-07 | Stop reason: HOSPADM

## 2019-02-06 RX ORDER — ONDANSETRON 2 MG/ML
4 INJECTION INTRAMUSCULAR; INTRAVENOUS EVERY 6 HOURS PRN
Status: DISCONTINUED | OUTPATIENT
Start: 2019-02-06 | End: 2019-02-07 | Stop reason: HOSPADM

## 2019-02-06 RX ORDER — IPRATROPIUM BROMIDE AND ALBUTEROL SULFATE 2.5; .5 MG/3ML; MG/3ML
3 SOLUTION RESPIRATORY (INHALATION)
Status: DISCONTINUED | OUTPATIENT
Start: 2019-02-06 | End: 2019-02-07 | Stop reason: HOSPADM

## 2019-02-06 RX ADMIN — LEVOFLOXACIN 750 MG: 5 INJECTION, SOLUTION INTRAVENOUS at 17:46

## 2019-02-06 RX ADMIN — FAMOTIDINE 20 MG: 10 INJECTION, SOLUTION INTRAVENOUS at 20:41

## 2019-02-06 RX ADMIN — ALBUTEROL SULFATE 2.5 MG: 2.5 SOLUTION RESPIRATORY (INHALATION) at 13:47

## 2019-02-06 RX ADMIN — INSULIN ASPART 5 UNITS: 100 INJECTION, SOLUTION INTRAVENOUS; SUBCUTANEOUS at 19:19

## 2019-02-06 RX ADMIN — ENOXAPARIN SODIUM 40 MG: 100 INJECTION SUBCUTANEOUS at 17:54

## 2019-02-06 RX ADMIN — LORAZEPAM 1 MG: 2 INJECTION INTRAMUSCULAR; INTRAVENOUS at 14:18

## 2019-02-06 RX ADMIN — LORAZEPAM 1 MG: 2 INJECTION INTRAMUSCULAR; INTRAVENOUS at 20:42

## 2019-02-06 RX ADMIN — ALBUTEROL SULFATE 2.5 MG: 2.5 SOLUTION RESPIRATORY (INHALATION) at 14:01

## 2019-02-06 RX ADMIN — SODIUM CHLORIDE 1000 ML: 9 INJECTION, SOLUTION INTRAVENOUS at 16:38

## 2019-02-06 RX ADMIN — SODIUM CHLORIDE 2115 ML: 9 INJECTION, SOLUTION INTRAVENOUS at 13:33

## 2019-02-06 RX ADMIN — AZITHROMYCIN MONOHYDRATE 500 MG: 500 INJECTION, POWDER, LYOPHILIZED, FOR SOLUTION INTRAVENOUS at 14:56

## 2019-02-06 RX ADMIN — LORAZEPAM 1 MG: 2 INJECTION INTRAMUSCULAR; INTRAVENOUS at 14:38

## 2019-02-06 RX ADMIN — SODIUM CHLORIDE 0.1 UNITS/KG/HR: 9 INJECTION, SOLUTION INTRAVENOUS at 16:32

## 2019-02-06 RX ADMIN — DEXTROSE MONOHYDRATE, SODIUM CHLORIDE, AND POTASSIUM CHLORIDE 150 ML/HR: 50; 4.5; 1.49 INJECTION, SOLUTION INTRAVENOUS at 21:07

## 2019-02-06 RX ADMIN — SODIUM CHLORIDE, PRESERVATIVE FREE 3 ML: 5 INJECTION INTRAVENOUS at 20:42

## 2019-02-06 RX ADMIN — GUAIFENESIN 600 MG: 600 TABLET, EXTENDED RELEASE ORAL at 20:41

## 2019-02-06 RX ADMIN — CEFTRIAXONE 2 G: 2 INJECTION, SOLUTION INTRAVENOUS at 14:29

## 2019-02-06 RX ADMIN — HUMAN INSULIN 12 UNITS: 100 INJECTION, SOLUTION SUBCUTANEOUS at 14:13

## 2019-02-06 RX ADMIN — IPRATROPIUM BROMIDE AND ALBUTEROL SULFATE 3 ML: .5; 3 SOLUTION RESPIRATORY (INHALATION) at 20:10

## 2019-02-06 RX ADMIN — SODIUM CHLORIDE 250 ML/HR: 4.5 INJECTION, SOLUTION INTRAVENOUS at 17:55

## 2019-02-06 NOTE — PROGRESS NOTES
Pharmacy was consulted to dose Lovenox for VTE prophylaxis.    SCr= 0.92; CrCl= 108.9mL/min; Platelets= 595    Begin Lovenox 40mg subq q24h.  Pharmacy will continue to follow.    Paulette Spain, PharmD  2/6/2019  4:13 PM

## 2019-02-06 NOTE — ED PROVIDER NOTES
"Subjective     History provided by:  Patient    History of Present Illness    · Chief complaint: Shortness of breath    · Location: Lungs    · Quality/Severity: Severe shortness of breath    · Timing/Onset: Started a week ago, is gotten significantly worse over the last couple of days    · Modifying Factors: Exertion and coughing exacerbates shortness of breath    · Associated symptoms: All productive of brown sputum.  Denies fever or chest pain.    · Narrative: The patient is a 53-year-old white male waning of shortness of breath that started a week ago.  The shortness of breath test as been worse in the last couple of days.  He is in a cough productive of brown sputum.  The patient has a history of COPD, but states he's never smoked.  He also has a history of diabetes, but states he's not checked his blood sugar and months and not taking any of his medications.  He states \"I just don't care anymore\".  The patient was admitted here last July and transferred to [facility] for a left lower lobe pneumonia and left-sided and hyphema which was surgically decorticated.  He is also noted to be in DKA with a COPD exacerbation at that time.  He states he's never smoked.    ED Triage Vitals   Temp Heart Rate Resp BP SpO2   02/06/19 1235 02/06/19 1235 02/06/19 1236 02/06/19 1235 02/06/19 1235   97.6 °F (36.4 °C) 118 (!) 30 (!) 178/106 97 %      Temp src Heart Rate Source Patient Position BP Location FiO2 (%)   02/06/19 1235 02/06/19 1235 02/06/19 1235 02/06/19 1235 --   Oral Monitor Lying Right arm        Review of Systems   Constitutional: Positive for activity change, appetite change and fever. Negative for chills, diaphoresis and fatigue.   HENT: Positive for congestion. Negative for dental problem, ear pain, hearing loss, mouth sores, postnasal drip, rhinorrhea, sinus pressure, sore throat and voice change.    Eyes: Negative for photophobia, pain, discharge, redness and visual disturbance.   Respiratory: " Positive for cough and shortness of breath. Negative for chest tightness, wheezing and stridor.    Cardiovascular: Negative for chest pain, palpitations and leg swelling.   Gastrointestinal: Negative for abdominal pain, diarrhea, nausea and vomiting.   Genitourinary: Negative for difficulty urinating, dysuria, flank pain, frequency, hematuria and urgency.   Musculoskeletal: Negative for arthralgias, back pain, gait problem, joint swelling, myalgias, neck pain and neck stiffness.   Skin: Negative for color change and rash.   Neurological: Positive for weakness. Negative for dizziness, tremors, seizures, syncope, facial asymmetry, speech difficulty, light-headedness, numbness and headaches.   Hematological: Negative for adenopathy.   Psychiatric/Behavioral: Negative.  Negative for confusion and decreased concentration. The patient is not nervous/anxious.        Past Medical History:   Diagnosis Date   • Diabetes mellitus (CMS/HCC)    • Hypertension        Allergies   Allergen Reactions   • Penicillins Unknown (See Comments)     reaction as child  Tolerated Ceftriaxone during July 2018 admission       Past Surgical History:   Procedure Laterality Date   • CHEST TUBE INSERTION     • EYE SURGERY     • INNER EAR SURGERY     • THORACOSCOPY Left 7/9/2018    Procedure: BRONCHOSCOPY, LEFT VIDEO ASSISTED THORACOSCOPY WITH DECORITCATION, INTERCOSTAL NERVE BLOCK WITH CRYO ABLATION;  Surgeon: Thomas Oliver III, MD;  Location: [facility];  Service: Thoracic       History reviewed. No pertinent family history.    Social History     Socioeconomic History   • Marital status: Legally      Spouse name: Not on file   • Number of children: Not on file   • Years of education: Not on file   • Highest education level: Not on file   Tobacco Use   • Smoking status: Never Smoker   • Smokeless tobacco: Never Used   Substance and Sexual Activity   • Alcohol use: Yes     Comment: occ   • Drug use: No           Objective   Physical  Exam   Constitutional: He is oriented to person, place, and time. He appears well-developed. He appears ill. He appears distressed.   Patient appears to be in moderately severe shortness of breath.  He appears pale.  Review of his vital signs: He is afebrile with temperature 97.6, tachypnea and the rest rate of 30 with a normal oxygen saturation of 97% on room air is tachycardic with a heart rate of 118, blood pressure elevated 178/106.   HENT:   Head: Normocephalic and atraumatic.   Mouth/Throat: Oropharynx is clear and moist.   Neck: Normal range of motion. Neck supple. No JVD present.   Cardiovascular: Normal rate and normal heart sounds.   No murmur heard.  Pulmonary/Chest: He has no wheezes. He has rhonchi in the right lower field and the left upper field. He exhibits no tenderness and no deformity.   Patient appears in respiratory distress with tachypnea.  On exam year rhonchi in both bases, much louder in the right base.  There is no wheezes.   Musculoskeletal: Normal range of motion.        Right lower leg: Normal. He exhibits no edema.        Left lower leg: Normal. He exhibits no edema.   Lymphadenopathy:     He has no cervical adenopathy.   Neurological: He is alert and oriented to person, place, and time. No cranial nerve deficit.   Skin: Skin is warm and dry. Capillary refill takes less than 2 seconds. No ecchymosis noted. No erythema.   Psychiatric:   Depressed mood   Nursing note and vitals reviewed.      ECG 12 Lead    Date/Time: 2/6/2019 12:39 PM  Performed by: Alfonso Phan MD  Authorized by: Alfonso Phan MD   Comments: EKG performed 12:39, EKG read 12:41.  Sinus tachycardia with a heart rate of 114, left axis deviation, no acute ST segment elevation or depression consistent with ischemia, no ectopy, normal TN and QT intervals.                 ED Course  ED Course as of Feb 06 1458   Wed Feb 06, 2019   1403 On exam the patient had rhonchi in both lower lobes.  Is noted to be in significant  respiratory distress with a respiratory rate of 30, a normal oxygen saturation of 97% on room air.  His EKG was interpreted by me is sinus tachycardia with a rate of 114 without ischemic changes.  Chest x-ray showed a left lower lobe infiltrate consistent with pneumonia.  The patient was a difficult IV stick, once IV access was obtained blood was sent for lab including 2 sets of blood cultures.  His white blood cell count was markedly elevated at 24.26 with a left shift.  Lactic acid elevated at 2.1.  Hemoglobin and hematocrit within normal limits.  Plate was markedly elevated at 595.  CMP had a low sodium of 129, and a normal potassium of 4.4.  His blood glucose is markedly elevated at 306 for which she was administered insulin regular 12 units subcutaneous.  His CO2 was low at 6.2 suggesting acidosis.  His alkaline phosphatase was elevated at 2:15 with the remainder liver function tests within normal limits.  Cardiac troponin negative.  Room air arterial blood gas showed metabolic acidosis with a pH of 7.158, PaCO2 markedly low at 10 consistent with hyperventilation, PO2 normal at 93.8, and oxygen saturation normal at 96.6%.  [TP]   1408 After the 2 sets of blood cultures were obtained of the chest x-ray reviewed Rocephin 2 g and Zithromax 500 mg was ordered.  After my initial evaluation and determination the patient was septic normal saline 30 cc/kg bolus was ordered.  [TP]   1421 14:15 patient informs me that he is no longer a patient of Dr. Read requested be admitted to the hospitalist.  [TP]   1422 15:18 patient discussed with Dr. Agee, hospitalist, who agreed to admit the patient to the intensive care unit.  [TP]      ED Course User Index  [TP] Alfonso Phan MD                  MDM  Number of Diagnoses or Management Options  Diabetic ketoacidosis without coma associated with type 2 diabetes mellitus (CMS/HCC): new and requires workup  Pneumonia of left lower lobe due to infectious organism  (CMS/[facility]): new and requires workup     Amount and/or Complexity of Data Reviewed  Clinical lab tests: reviewed and ordered  Tests in the radiology section of CPT®: ordered and reviewed  Tests in the medicine section of CPT®: ordered and reviewed  Discuss the patient with other providers: yes  Independent visualization of images, tracings, or specimens: yes    Risk of Complications, Morbidity, and/or Mortality  Presenting problems: high  Diagnostic procedures: high  Management options: high  General comments: My differential diagnosis for dyspnea includes but is not limited to:  Asthma, COPD, pneumonia, pulmonary embolus, acute respiratory distress syndrome, pneumothorax, pleural effusion, pulmonary fibrosis, congestive heart failure, myocardial infarction, DKA, uremia, acidosis, sepsis, anemia, drug related, hyperventilation, CNS disease    Patient Progress  Patient progress: stable        Final diagnoses:   Pneumonia of left lower lobe due to infectious organism (CMS/[facility])   Diabetic ketoacidosis without coma associated with type 2 diabetes mellitus (CMS/HCC)           Labs Reviewed   COMPREHENSIVE METABOLIC PANEL - Abnormal; Notable for the following components:       Result Value    Glucose 306 (*)     Sodium 129 (*)     Chloride 94 (*)     CO2 6.2 (*)     Total Protein 9.3 (*)     Alkaline Phosphatase 215 (*)     All other components within normal limits   LACTIC ACID, PLASMA - Abnormal; Notable for the following components:    Lactate 2.1 (*)     All other components within normal limits   CBC WITH AUTO DIFFERENTIAL - Abnormal; Notable for the following components:    WBC 24.26 (*)     Platelets 595 (*)     Neutrophil % 81.1 (*)     Lymphocyte % 7.9 (*)     Immature Grans % 2.8 (*)     Neutrophils, Absolute 19.68 (*)     Monocytes, Absolute 1.90 (*)     Eosinophils, Absolute 0.00 (*)     Immature Grans, Absolute 0.68 (*)     All other components within normal limits   BNP (IN-HOUSE) - Abnormal; Notable for the  following components:    proBNP 358.7 (*)     All other components within normal limits    Narrative:     Among patients with dyspnea, NT-proBNP is highly sensitive for the detection of acute congestive heart failure. In addition NT-proBNP of <300 pg/ml effectively rules out acute congestive heart failure with 99% negative predictive value.   TROPONIN (IN-HOUSE) - Normal    Narrative:     Troponin T Reference Range:  <= 0.03 ng/mL-   Negative for AMI  >0.03 ng/mL-     Abnormal for myocardial necrosis.  Clinicians would have to utilize clinical acumen, EKG, Troponin and serial changes to determine if it is an Acute Myocardial Infarction or myocardial injury due to an underlying chronic condition.    BLOOD CULTURE   BLOOD CULTURE   RESPIRATORY CULTURE   RAINBOW DRAW    Narrative:     The following orders were created for panel order [place] Draw.  Procedure                               Abnormality         Status                     ---------                               -----------         ------                     Light Blue Top[[id number]]                                   Final result               Green Top (Gel)[[id number]]                                  Final result               Lavender Top[[id number]]                                     Final result               Gold Top - SST[[id number]]                                   Final result                 Please view results for these tests on the individual orders.   BLOOD GAS, ARTERIAL   LACTIC ACID REFLEX TIMER   BLOOD GAS, ARTERIAL   ACETONE   CBC AND DIFFERENTIAL    Narrative:     The following orders were created for panel order CBC & Differential.  Procedure                               Abnormality         Status                     ---------                               -----------         ------                     CBC Auto Differential[[id number]]        Abnormal            Final result                 Please view results for these tests on the  individual orders.   LIGHT BLUE TOP   GREEN TOP   LAVENDER TOP   GOLD TOP - SST   KETONE BODIES SERUM    Narrative:     The following orders were created for panel order Ketone Bodies, Serum (Not performed at [place]).  Procedure                               Abnormality         Status                     ---------                               -----------         ------                     Acetone[[id number]]                                          In process                   Please view results for these tests on the individual orders.     XR Chest 1 View   ED Interpretation   Left lower lobe infiltrate consistent with pneumonia      Final Result   1. Patchy interstitial and alveolar opacities in the left base   suspicious for pneumonia. Follow-up to clearing recommended.   2. Improved inspiratory result with near complete resolution of the   left-sided effusion since the prior study. Residual trace to small left   effusion or chronic pleural reaction blunting the left CP angle.       This report was finalized on 2/6/2019 2:06 PM by Dr. Rashad Silva MD.                 Medication List      No changes were made to your prescriptions during this visit.            Alfonso Phan MD  02/06/19 [id number]

## 2019-02-06 NOTE — H&P
[facility] HOSPITALIST     Provider, No Known    CHIEF COMPLAINT: Cough, shortness of air, fever    HISTORY OF PRESENT ILLNESS:  The patient is a 53-year-old male that presented to the emergency department secondary to 7 days of increased cough productive of brownish sputum, shortness of air, fever that worsened over the past 3 days.  He notes unknown amount of weight loss recently due to inability to eat with shortness of air.  Significant other at bedside and notes she has had upper respiratory symptoms recently as well.      He reports history of DKA in the past, unsure regarding sugars or A1C. H/O VATS LLL secondary to empyema 7/2018. He notes no pulmonary issues since that time.     Denies headache/rhinorrhea/nasal congestion/lightheadedness/syncopal sensation/n/v/d/chest pain/abdominal pain/recent illness/change in bowel or bladder habits/bloody emesis or bloody stools/change in medications/tobacco use or any other new concerns.    Past Medical History:   Diagnosis Date   • Diabetes mellitus (CMS/HCC)    • Hypertension      Past Surgical History:   Procedure Laterality Date   • CHEST TUBE INSERTION     • EYE SURGERY     • INNER EAR SURGERY     • THORACOSCOPY Left 7/9/2018    Procedure: BRONCHOSCOPY, LEFT VIDEO ASSISTED THORACOSCOPY WITH DECORITCATION, INTERCOSTAL NERVE BLOCK WITH CRYO ABLATION;  Surgeon: Thomas Oliver III, MD;  Location: [facility];  Service: Thoracic     History reviewed. No pertinent family history.  Social History     Tobacco Use   • Smoking status: Never Smoker   • Smokeless tobacco: Never Used   Substance Use Topics   • Alcohol use: Yes     Comment: occ   • Drug use: No     Medications Prior to Admission   Medication Sig Dispense Refill Last Dose   • BABY ASPIRIN PO Take 1 tablet by mouth Daily.   Past Month at Unknown time   • celecoxib (CELEBREX) 200 MG capsule Take 1 capsule by mouth Daily. 30 capsule 1 Not Taking   • ondansetron (ZOFRAN) 8 MG tablet Take 1  "tablet by mouth Every 8 (Eight) Hours As Needed for Nausea or Vomiting. 30 tablet 1 Not Taking   • oxyCODONE-acetaminophen (PERCOCET) 5-325 MG per tablet Take 1 tablet by mouth Every 6 (Six) Hours As Needed for pain. 30 tablet 0 Not Taking   • oxyCODONE-acetaminophen (PERCOCET) 7.5-325 MG per tablet Take 1 tablet by mouth Every 6 (Six) Hours As Needed for pain. 60 tablet 0 Not Taking   • sulfamethoxazole-trimethoprim (BACTRIM DS) 800-160 MG per tablet Take 1 tablet by mouth 2 (Two) Times a Day. 20 tablet 0      Allergies:  Penicillins      There is no immunization history on file for this patient.    REVIEW OF SYSTEMS:  Please see the above history of present illness for pertinent positives and negatives.  The remainder of the patient's systems have been reviewed and are negative.     Vital Signs  Temp:  [97.6 °F (36.4 °C)] 97.6 °F (36.4 °C)  Heart Rate:  [118-129] 129  Resp:  [24-43] 40  BP: (178-193)/() 193/75    Flowsheet Rows      First Filed Value   Admission Height  175 cm (68.9\") Documented at 02/06/2019 1235   Admission Weight  104 kg (230 lb) Documented at 02/06/2019 1235           Physical Exam:  Physical Exam   Constitutional: Patient appears well-developed and well-nourished and in mild acute distress   HEENT:   Head: Normocephalic and atraumatic.   Eyes:  Pupils are equal, round, and reactive to light. EOM are intact. Sclera are anicteric and non-injected.  Mouth and Throat: Patient has moist mucous membranes. Oropharynx is clear of any erythema or exudate.     Neck: Neck supple. No JVD present. No thyromegaly present. No lymphadenopathy present.  Cardiovascular: tachycardic rate, regular rhythm, S1 normal and S2 normal.  Exam reveals no gallop and no friction rub.  No murmur heard.  Pulmonary/Chest: Lungs coarse bilateral breath sounds throughout, rhonchi, decreased LLL, tachypneic  Abdominal: obese, Soft. Bowel sounds are normal. No distension and no mass. There is no hepatosplenomegaly. There " is no tenderness.   Musculoskeletal: Normal Muscle tone  Extremities: No edema. Pulses are palpable in all 4 extremities.  Neurological: Patient is alert and oriented to person, place, and time. Cranial nerves II-XII are grossly intact with no focal deficits.  Skin: Skin is warm. No rash noted. Nails show no clubbing.  No cyanosis or erythema.    Emotional Behavior:    Judgement and Insight: fair   Mental Status:  Alertness  alert   Memory:  fair   Mood and Affect:         Depression  mild               Anxiety  moderate    Debilities:   Physical Weakness  None observed   Handicaps  None observed   Disabilities  None    Agitation  none     Results Review:    I reviewed the patient's new clinical results.  Lab Results (most recent)     Procedure Component Value Units Date/Time    Ketone Bodies, Serum (Not performed at [place]) [[id number]] Collected:  02/06/19 1259    Specimen:  Blood Updated:  02/06/19 1502    Narrative:       The following orders were created for panel order Ketone Bodies, Serum (Not performed at [facility]).  Procedure                               Abnormality         Status                     ---------                               -----------         ------                     Acetone[[id number]]                      Abnormal            Final result                 Please view results for these tests on the individual orders.    Acetone [[id number]]  (Abnormal) Collected:  02/06/19 1259    Specimen:  Blood Updated:  02/06/19 1502     Acetone Small    Blood Culture - Blood, Hand, Right [[id number]] Collected:  02/06/19 1428    Specimen:  Blood from Hand, Right Updated:  02/06/19 1433    Respiratory Culture - Sputum, Cough [[id number]] Collected:  02/06/19 1414    Specimen:  Sputum from Cough Updated:  02/06/19 1433    BNP [[id number]]  (Abnormal) Collected:  02/06/19 1259    Specimen:  Blood Updated:  02/06/19 1417     proBNP 358.7 pg/mL     Narrative:       Among patients with dyspnea, NT-proBNP is  highly sensitive for the detection of acute congestive heart failure. In addition NT-proBNP of <300 pg/ml effectively rules out acute congestive heart failure with 99% negative predictive value.    [place] Draw [[id number]] Collected:  02/06/19 1259    Specimen:  Blood Updated:  02/06/19 1415    Narrative:       The following orders were created for panel order [place] Draw.  Procedure                               Abnormality         Status                     ---------                               -----------         ------                     Light Blue Top[[id number]]                                   Final result               Green Top (Gel)[[id number]]                                  Final result               Lavender Top[[id number]]                                     Final result               Gold Top - SST[[id number]]                                   Final result                 Please view results for these tests on the individual orders.    Gold Top - SST [[id number]] Collected:  02/06/19 1301    Specimen:  Blood Updated:  02/06/19 1415     Extra Tube Hold for add-ons.     Comment: Auto resulted.       Blood Gas, Arterial [[id number]] Collected:  02/06/19 1359    Specimen:  Arterial Blood Updated:  02/06/19 1404    Light Blue Top [[id number]] Collected:  02/06/19 1300    Specimen:  Blood Updated:  02/06/19 1400     Extra Tube hold for add-on     Comment: Auto resulted       Green Top (Gel) [[id number]] Collected:  02/06/19 1300    Specimen:  Blood Updated:  02/06/19 1400     Extra Tube Hold for add-ons.     Comment: Auto resulted.       Lavender Top [[id number]] Collected:  02/06/19 1259    Specimen:  Blood Updated:  02/06/19 1400     Extra Tube hold for add-on     Comment: Auto resulted       CBC & Differential [[id number]] Collected:  02/06/19 1259    Specimen:  Blood Updated:  02/06/19 1351    Narrative:       The following orders were created for panel order CBC & Differential.  Procedure                                Abnormality         Status                     ---------                               -----------         ------                     CBC Auto Differential[[id number]]        Abnormal            Final result                 Please view results for these tests on the individual orders.    CBC Auto Differential [[id number]]  (Abnormal) Collected:  02/06/19 1259    Specimen:  Blood Updated:  02/06/19 1351     WBC 24.26 10*3/mm3      RBC 5.50 10*6/mm3      Hemoglobin 15.3 g/dL      Hematocrit 46.0 %      MCV 83.6 fL      MCH 27.8 pg      MCHC 33.3 g/dL      RDW 12.5 %      RDW-SD 38.3 fl      MPV 9.1 fL      Platelets 595 10*3/mm3      Neutrophil % 81.1 %      Lymphocyte % 7.9 %      Monocyte % 7.8 %      Eosinophil % 0.0 %      Basophil % 0.4 %      Immature Grans % 2.8 %      Neutrophils, Absolute 19.68 10*3/mm3      Lymphocytes, Absolute 1.91 10*3/mm3      Monocytes, Absolute 1.90 10*3/mm3      Eosinophils, Absolute 0.00 10*3/mm3      Basophils, Absolute 0.09 10*3/mm3      Immature Grans, Absolute 0.68 10*3/mm3      nRBC 0.0 /100 WBC     Lactic Acid, Plasma [[id number]]  (Abnormal) Collected:  02/06/19 1313    Specimen:  Blood Updated:  02/06/19 1339     Lactate 2.1 mmol/L     Lactic Acid, Reflex Timer (This will reflex a repeat order 3-3:15 hours after ordered.) [[id number]] Collected:  02/06/19 1313    Specimen:  Blood Updated:  02/06/19 1339    Troponin [[id number]]  (Normal) Collected:  02/06/19 1300    Specimen:  Blood Updated:  02/06/19 1325     Troponin T <0.010 ng/mL     Narrative:       Troponin T Reference Range:  <= 0.03 ng/mL-   Negative for AMI  >0.03 ng/mL-     Abnormal for myocardial necrosis.  Clinicians would have to utilize clinical acumen, EKG, Troponin and serial changes to determine if it is an Acute Myocardial Infarction or myocardial injury due to an underlying chronic condition.     Comprehensive Metabolic Panel [[id number]]  (Abnormal) Collected:  02/06/19 1300    Specimen:  Blood  Updated:  02/06/19 1323     Glucose 306 mg/dL      BUN 7 mg/dL      Creatinine 0.92 mg/dL      Sodium 129 mmol/L      Potassium 4.4 mmol/L      Chloride 94 mmol/L      CO2 6.2 mmol/L      Calcium 9.5 mg/dL      Total Protein 9.3 g/dL      Albumin 3.70 g/dL      ALT (SGPT) 6 U/L      AST (SGOT) 8 U/L      Alkaline Phosphatase 215 U/L      Total Bilirubin 0.4 mg/dL      eGFR Non African Amer 86 mL/min/1.73      Globulin 5.6 gm/dL      A/G Ratio 0.7 g/dL      BUN/Creatinine Ratio 7.6     Anion Gap 28.8 mmol/L     Blood Culture - Blood, Arm, Left [[id number]] Collected:  02/06/19 1259    Specimen:  Blood from Arm, Left Updated:  02/06/19 1307          Imaging Results (most recent)     Procedure Component Value Units Date/Time    XR Chest 1 View [[id number]] Collected:  02/06/19 1404     Updated:  02/06/19 1408    Narrative:       FRONTAL CHEST 2/6/2019         HISTORY:  Worsening shortness of air that began last week. Sweating profusely.     TECHNIQUE:  Frontal chest was performed. Comparisons 7/7/2018     FINDINGS:  Cardiac silhouette is within normal limits. Vascularity is unremarkable.  Improved inspiratory result. The right lung appears clear. There has  been interval near complete resolution of a left-sided effusion. There  is blunting of the left CP angle either reflecting a residual trace to  small effusion or pleural reaction. There are patchy interstitial and  alveolar opacities in the left base, suspicious for pneumonia. No  pneumothorax.       Impression:       1. Patchy interstitial and alveolar opacities in the left base  suspicious for pneumonia. Follow-up to clearing recommended.  2. Improved inspiratory result with near complete resolution of the  left-sided effusion since the prior study. Residual trace to small left  effusion or chronic pleural reaction blunting the left CP angle.     This report was finalized on 2/6/2019 2:06 PM by Dr. Rashad Silva MD.           reviewed    ECG/EMG Results (most  recent)     Procedure Component Value Units Date/Time    ECG 12 Lead [[id number]] Collected:  02/06/19 1239     Updated:  02/06/19 1359    Narrative:       HEART RATE= 114  bpm  RR Interval= 524  ms  CA Interval= 149  ms  P Horizontal Axis= 45  deg  P Front Axis= 66  deg  QRSD Interval= 111  ms  QT Interval= 338  ms  QRS Axis= -25  deg  T Wave Axis= 26  deg  - OTHERWISE NORMAL ECG -  Sinus tachycardia  Borderline left axis deviation  Electronically Signed By:   Date and Time of Study: 2019-02-06 12:39:27    ECG 12 Lead [[id number]] Resulted:  02/06/19 1459     Updated:  02/06/19 1459        reviewed    Assessment/Plan   Sepsis secondary to LLL pneumonia: Consult pulmonary  H/O VATS LLL/empyema 7/2018:  Received azithromycin, Rocephin, to Levaquin  Add duo nebs, Mucinex, acappella  BiPAP on standby, abg pending now  Check pro-calcitonin, Legionella, strep pneumo, sputum culture, MRSA swab, respiratory viral panel  Check echo  NPO  Monitor in ICU    DKA in obese: check A1C  Body mass index is 34.07 kg/m².  Reportedly noncompliant with checking sugars, no medications noted in med rec  DKA protocol initiated  Hyponatremia: Corrects for glucose  CO2 6.2, recheck per protocol    AHRF secondary to above:   Hyperventilating, recheck ABG stat, no result in chart for previous ABG this afternoon    Leukocytosis/thrombocytosis:  Left shift  Likely dehydration secondary to all above, continue fluid resuscitation/abx, recheck in am    Discussed the patients findings and my recommendations with patient and fiance at bedside.     Tammy To, RUBENS  02/06/19  3:59 PM

## 2019-02-07 ENCOUNTER — APPOINTMENT (OUTPATIENT)
Dept: CARDIOLOGY | Facility: HOSPITAL | Age: 54
End: 2019-02-07

## 2019-02-07 ENCOUNTER — APPOINTMENT (OUTPATIENT)
Dept: GENERAL RADIOLOGY | Facility: HOSPITAL | Age: 54
End: 2019-02-07

## 2019-02-07 VITALS
DIASTOLIC BLOOD PRESSURE: 91 MMHG | HEIGHT: 68 IN | RESPIRATION RATE: 18 BRPM | BODY MASS INDEX: 32.44 KG/M2 | WEIGHT: 214.07 LBS | SYSTOLIC BLOOD PRESSURE: 141 MMHG | TEMPERATURE: 98.2 F | HEART RATE: 97 BPM | OXYGEN SATURATION: 94 %

## 2019-02-07 LAB
ANION GAP SERPL CALCULATED.3IONS-SCNC: 10.8 MMOL/L
ANION GAP SERPL CALCULATED.3IONS-SCNC: 12.1 MMOL/L
ANION GAP SERPL CALCULATED.3IONS-SCNC: 12.6 MMOL/L
ANION GAP SERPL CALCULATED.3IONS-SCNC: 13.7 MMOL/L
BASOPHILS # BLD AUTO: 0.05 10*3/MM3 (ref 0–0.2)
BASOPHILS NFR BLD AUTO: 0.2 % (ref 0–2)
BH CV ECHO MEAS - ACS: 2.4 CM
BH CV ECHO MEAS - AO MAX PG (FULL): 2 MMHG
BH CV ECHO MEAS - AO MAX PG: 7 MMHG
BH CV ECHO MEAS - AO MEAN PG (FULL): 2 MMHG
BH CV ECHO MEAS - AO MEAN PG: 4 MMHG
BH CV ECHO MEAS - AO ROOT AREA (BSA CORRECTED): 1.6
BH CV ECHO MEAS - AO ROOT AREA: 8.8 CM^2
BH CV ECHO MEAS - AO ROOT DIAM: 3.4 CM
BH CV ECHO MEAS - AO V2 MAX: 132 CM/SEC
BH CV ECHO MEAS - AO V2 MEAN: 94.9 CM/SEC
BH CV ECHO MEAS - AO V2 VTI: 19.8 CM
BH CV ECHO MEAS - AVA(I,A): 3.5 CM^2
BH CV ECHO MEAS - AVA(I,D): 3.5 CM^2
BH CV ECHO MEAS - AVA(V,A): 3.8 CM^2
BH CV ECHO MEAS - AVA(V,D): 3.8 CM^2
BH CV ECHO MEAS - BSA(HAYCOCK): 2.2 M^2
BH CV ECHO MEAS - BSA: 2.1 M^2
BH CV ECHO MEAS - BZI_BMI: 32.5 KILOGRAMS/M^2
BH CV ECHO MEAS - BZI_METRIC_HEIGHT: 172.7 CM
BH CV ECHO MEAS - BZI_METRIC_WEIGHT: 97.1 KG
BH CV ECHO MEAS - EDV(CUBED): 52.7 ML
BH CV ECHO MEAS - EDV(MOD-SP2): 160 ML
BH CV ECHO MEAS - EDV(MOD-SP4): 135 ML
BH CV ECHO MEAS - EDV(TEICH): 60 ML
BH CV ECHO MEAS - EF(CUBED): 60.6 %
BH CV ECHO MEAS - EF(MOD-BP): 48 %
BH CV ECHO MEAS - EF(MOD-SP2): 41.9 %
BH CV ECHO MEAS - EF(MOD-SP4): 48.2 %
BH CV ECHO MEAS - EF(TEICH): 52.9 %
BH CV ECHO MEAS - ESV(CUBED): 20.8 ML
BH CV ECHO MEAS - ESV(MOD-SP2): 93 ML
BH CV ECHO MEAS - ESV(MOD-SP4): 69.9 ML
BH CV ECHO MEAS - ESV(TEICH): 28.3 ML
BH CV ECHO MEAS - FS: 26.7 %
BH CV ECHO MEAS - IVS/LVPW: 0.99
BH CV ECHO MEAS - IVSD: 1 CM
BH CV ECHO MEAS - LA DIMENSION: 3.1 CM
BH CV ECHO MEAS - LA/AO: 0.93
BH CV ECHO MEAS - LAT PEAK E' VEL: 13 CM/SEC
BH CV ECHO MEAS - LV DIASTOLIC VOL/BSA (35-75): 64.2 ML/M^2
BH CV ECHO MEAS - LV MASS(C)D: 122.4 GRAMS
BH CV ECHO MEAS - LV MASS(C)DI: 58.2 GRAMS/M^2
BH CV ECHO MEAS - LV MAX PG: 5 MMHG
BH CV ECHO MEAS - LV MEAN PG: 2 MMHG
BH CV ECHO MEAS - LV SYSTOLIC VOL/BSA (12-30): 33.2 ML/M^2
BH CV ECHO MEAS - LV V1 MAX: 112 CM/SEC
BH CV ECHO MEAS - LV V1 MEAN: 61.7 CM/SEC
BH CV ECHO MEAS - LV V1 VTI: 15.4 CM
BH CV ECHO MEAS - LVIDD: 3.8 CM
BH CV ECHO MEAS - LVIDS: 2.8 CM
BH CV ECHO MEAS - LVLD AP2: 9.2 CM
BH CV ECHO MEAS - LVLD AP4: 8.5 CM
BH CV ECHO MEAS - LVLS AP2: 8.1 CM
BH CV ECHO MEAS - LVLS AP4: 7.6 CM
BH CV ECHO MEAS - LVOT AREA (M): 4.5 CM^2
BH CV ECHO MEAS - LVOT AREA: 4.5 CM^2
BH CV ECHO MEAS - LVOT DIAM: 2.4 CM
BH CV ECHO MEAS - LVPWD: 1.1 CM
BH CV ECHO MEAS - MED PEAK E' VEL: 8 CM/SEC
BH CV ECHO MEAS - MV A DUR: 0.1 SEC
BH CV ECHO MEAS - MV A MAX VEL: 99.8 CM/SEC
BH CV ECHO MEAS - MV DEC SLOPE: 231 CM/SEC^2
BH CV ECHO MEAS - MV DEC TIME: 0.14 SEC
BH CV ECHO MEAS - MV E MAX VEL: 75.9 CM/SEC
BH CV ECHO MEAS - MV E/A: 0.76
BH CV ECHO MEAS - MV MAX PG: 5.1 MMHG
BH CV ECHO MEAS - MV MEAN PG: 3 MMHG
BH CV ECHO MEAS - MV P1/2T MAX VEL: 83.4 CM/SEC
BH CV ECHO MEAS - MV P1/2T: 105.7 MSEC
BH CV ECHO MEAS - MV V2 MAX: 113 CM/SEC
BH CV ECHO MEAS - MV V2 MEAN: 75.2 CM/SEC
BH CV ECHO MEAS - MV V2 VTI: 21.1 CM
BH CV ECHO MEAS - MVA P1/2T LCG: 2.6 CM^2
BH CV ECHO MEAS - MVA(P1/2T): 2.1 CM^2
BH CV ECHO MEAS - MVA(VTI): 3.3 CM^2
BH CV ECHO MEAS - PA ACC TIME: 0.09 SEC
BH CV ECHO MEAS - PA MAX PG (FULL): -0.45 MMHG
BH CV ECHO MEAS - PA MAX PG: 2.4 MMHG
BH CV ECHO MEAS - PA PR(ACCEL): 37.6 MMHG
BH CV ECHO MEAS - PA V2 MAX: 77.3 CM/SEC
BH CV ECHO MEAS - PULM A REVS DUR: 0.12 SEC
BH CV ECHO MEAS - PULM A REVS VEL: 36.7 CM/SEC
BH CV ECHO MEAS - PULM DIAS VEL: 38.1 CM/SEC
BH CV ECHO MEAS - PULM S/D: 1.8
BH CV ECHO MEAS - PULM SYS VEL: 67.6 CM/SEC
BH CV ECHO MEAS - PVA(V,A): 5.8 CM^2
BH CV ECHO MEAS - PVA(V,D): 5.8 CM^2
BH CV ECHO MEAS - QP/QS: 1
BH CV ECHO MEAS - RV MAX PG: 2.8 MMHG
BH CV ECHO MEAS - RV MEAN PG: 2 MMHG
BH CV ECHO MEAS - RV V1 MAX: 84.2 CM/SEC
BH CV ECHO MEAS - RV V1 MEAN: 60.2 CM/SEC
BH CV ECHO MEAS - RV V1 VTI: 13.3 CM
BH CV ECHO MEAS - RVOT AREA: 5.3 CM^2
BH CV ECHO MEAS - RVOT DIAM: 2.6 CM
BH CV ECHO MEAS - SI(AO): 83 ML/M^2
BH CV ECHO MEAS - SI(CUBED): 15.2 ML/M^2
BH CV ECHO MEAS - SI(LVOT): 33.1 ML/M^2
BH CV ECHO MEAS - SI(MOD-SP2): 31.9 ML/M^2
BH CV ECHO MEAS - SI(MOD-SP4): 30.9 ML/M^2
BH CV ECHO MEAS - SI(TEICH): 15.1 ML/M^2
BH CV ECHO MEAS - SV(AO): 174.5 ML
BH CV ECHO MEAS - SV(CUBED): 31.9 ML
BH CV ECHO MEAS - SV(LVOT): 69.7 ML
BH CV ECHO MEAS - SV(MOD-SP2): 67 ML
BH CV ECHO MEAS - SV(MOD-SP4): 65.1 ML
BH CV ECHO MEAS - SV(RVOT): 70.6 ML
BH CV ECHO MEAS - SV(TEICH): 31.8 ML
BH CV ECHO MEAS - TAPSE (>1.6): 2.4 CM2
BH CV ECHO MEASUREMENTS AVERAGE E/E' RATIO: 7.23
BH CV VAS BP RIGHT ARM: NORMAL MMHG
BH CV XLRA - RV BASE: 4.6 CM
BH CV XLRA - TDI S': 14 CM/SEC
BUN BLD-MCNC: 4 MG/DL (ref 6–20)
BUN BLD-MCNC: 5 MG/DL (ref 6–20)
BUN BLD-MCNC: 6 MG/DL (ref 6–20)
BUN BLD-MCNC: 7 MG/DL (ref 6–20)
BUN/CREAT SERPL: 11.5 (ref 7–25)
BUN/CREAT SERPL: 11.9 (ref 7–25)
BUN/CREAT SERPL: 9.3 (ref 7–25)
BUN/CREAT SERPL: 9.8 (ref 7–25)
CALCIUM SPEC-SCNC: 8.4 MG/DL (ref 8.6–10.5)
CALCIUM SPEC-SCNC: 8.6 MG/DL (ref 8.6–10.5)
CALCIUM SPEC-SCNC: 8.7 MG/DL (ref 8.6–10.5)
CALCIUM SPEC-SCNC: 8.9 MG/DL (ref 8.6–10.5)
CHLORIDE SERPL-SCNC: 104 MMOL/L (ref 98–107)
CHLORIDE SERPL-SCNC: 105 MMOL/L (ref 98–107)
CHLORIDE SERPL-SCNC: 105 MMOL/L (ref 98–107)
CHLORIDE SERPL-SCNC: 107 MMOL/L (ref 98–107)
CO2 SERPL-SCNC: 13.3 MMOL/L (ref 22–29)
CO2 SERPL-SCNC: 13.9 MMOL/L (ref 22–29)
CO2 SERPL-SCNC: 14.4 MMOL/L (ref 22–29)
CO2 SERPL-SCNC: 15.2 MMOL/L (ref 22–29)
CREAT BLD-MCNC: 0.43 MG/DL (ref 0.76–1.27)
CREAT BLD-MCNC: 0.51 MG/DL (ref 0.76–1.27)
CREAT BLD-MCNC: 0.52 MG/DL (ref 0.76–1.27)
CREAT BLD-MCNC: 0.59 MG/DL (ref 0.76–1.27)
DEPRECATED RDW RBC AUTO: 35.6 FL (ref 37–54)
EOSINOPHIL # BLD AUTO: 0.02 10*3/MM3 (ref 0.1–0.3)
EOSINOPHIL NFR BLD AUTO: 0.1 % (ref 0–4)
ERYTHROCYTE [DISTWIDTH] IN BLOOD BY AUTOMATED COUNT: 12.5 % (ref 11.5–14.5)
GFR SERPL CREATININE-BSD FRML MDRD: 143 ML/MIN/1.73
GFR SERPL CREATININE-BSD FRML MDRD: >150 ML/MIN/1.73
GLUCOSE BLD-MCNC: 149 MG/DL (ref 65–99)
GLUCOSE BLD-MCNC: 160 MG/DL (ref 65–99)
GLUCOSE BLD-MCNC: 168 MG/DL (ref 65–99)
GLUCOSE BLD-MCNC: 189 MG/DL (ref 65–99)
GLUCOSE BLDC GLUCOMTR-MCNC: 160 MG/DL (ref 70–130)
GLUCOSE BLDC GLUCOMTR-MCNC: 165 MG/DL (ref 70–130)
GLUCOSE BLDC GLUCOMTR-MCNC: 166 MG/DL (ref 70–130)
GLUCOSE BLDC GLUCOMTR-MCNC: 166 MG/DL (ref 70–130)
GLUCOSE BLDC GLUCOMTR-MCNC: 169 MG/DL (ref 70–130)
GLUCOSE BLDC GLUCOMTR-MCNC: 170 MG/DL (ref 70–130)
GLUCOSE BLDC GLUCOMTR-MCNC: 177 MG/DL (ref 70–130)
GLUCOSE BLDC GLUCOMTR-MCNC: 189 MG/DL (ref 70–130)
GLUCOSE BLDC GLUCOMTR-MCNC: 204 MG/DL (ref 70–130)
HCT VFR BLD AUTO: 34.7 % (ref 42–52)
HGB BLD-MCNC: 12.3 G/DL (ref 14–18)
IMM GRANULOCYTES # BLD AUTO: 0.31 10*3/MM3 (ref 0–0.03)
IMM GRANULOCYTES NFR BLD AUTO: 1.4 % (ref 0–0.5)
LEFT ATRIUM VOLUME INDEX: 19 ML/M2
LV EF 2D ECHO EST: 48 %
LYMPHOCYTES # BLD AUTO: 1.99 10*3/MM3 (ref 0.6–4.8)
LYMPHOCYTES NFR BLD AUTO: 8.9 % (ref 20–45)
MAGNESIUM SERPL-MCNC: 1.6 MG/DL (ref 1.7–2.5)
MAGNESIUM SERPL-MCNC: 1.7 MG/DL (ref 1.7–2.5)
MAGNESIUM SERPL-MCNC: 1.7 MG/DL (ref 1.7–2.5)
MAGNESIUM SERPL-MCNC: 2.2 MG/DL (ref 1.7–2.5)
MAXIMAL PREDICTED HEART RATE: 166 BPM
MCH RBC QN AUTO: 28 PG (ref 27–31)
MCHC RBC AUTO-ENTMCNC: 35.4 G/DL (ref 31–37)
MCV RBC AUTO: 78.9 FL (ref 80–94)
MONOCYTES # BLD AUTO: 2.27 10*3/MM3 (ref 0–1)
MONOCYTES NFR BLD AUTO: 10.1 % (ref 3–8)
NEUTROPHILS # BLD AUTO: 17.81 10*3/MM3 (ref 1.5–8.3)
NEUTROPHILS NFR BLD AUTO: 79.3 % (ref 45–70)
NRBC BLD AUTO-RTO: 0 /100 WBC (ref 0–0)
PHOSPHATE SERPL-MCNC: 0.8 MG/DL (ref 2.7–4.5)
PHOSPHATE SERPL-MCNC: 1 MG/DL (ref 2.7–4.5)
PHOSPHATE SERPL-MCNC: 1 MG/DL (ref 2.7–4.5)
PHOSPHATE SERPL-MCNC: 1.1 MG/DL (ref 2.7–4.5)
PLATELET # BLD AUTO: 506 10*3/MM3 (ref 140–500)
PMV BLD AUTO: 8.7 FL (ref 7.4–10.4)
POTASSIUM BLD-SCNC: 3.4 MMOL/L (ref 3.5–5.2)
POTASSIUM BLD-SCNC: 3.6 MMOL/L (ref 3.5–5.2)
POTASSIUM BLD-SCNC: 3.6 MMOL/L (ref 3.5–5.2)
POTASSIUM BLD-SCNC: 3.7 MMOL/L (ref 3.5–5.2)
PROCALCITONIN SERPL-MCNC: 0.07 NG/ML (ref 0.1–0.25)
RBC # BLD AUTO: 4.4 10*6/MM3 (ref 4.7–6.1)
SODIUM BLD-SCNC: 131 MMOL/L (ref 136–145)
SODIUM BLD-SCNC: 131 MMOL/L (ref 136–145)
SODIUM BLD-SCNC: 132 MMOL/L (ref 136–145)
SODIUM BLD-SCNC: 133 MMOL/L (ref 136–145)
STRESS TARGET HR: 141 BPM
WBC NRBC COR # BLD: 22.45 10*3/MM3 (ref 4.8–10.8)

## 2019-02-07 PROCEDURE — 84100 ASSAY OF PHOSPHORUS: CPT | Performed by: NURSE PRACTITIONER

## 2019-02-07 PROCEDURE — 80048 BASIC METABOLIC PNL TOTAL CA: CPT | Performed by: NURSE PRACTITIONER

## 2019-02-07 PROCEDURE — 84145 PROCALCITONIN (PCT): CPT | Performed by: NURSE PRACTITIONER

## 2019-02-07 PROCEDURE — 99232 SBSQ HOSP IP/OBS MODERATE 35: CPT | Performed by: NURSE PRACTITIONER

## 2019-02-07 PROCEDURE — 83735 ASSAY OF MAGNESIUM: CPT | Performed by: NURSE PRACTITIONER

## 2019-02-07 PROCEDURE — 71045 X-RAY EXAM CHEST 1 VIEW: CPT

## 2019-02-07 PROCEDURE — 94799 UNLISTED PULMONARY SVC/PX: CPT

## 2019-02-07 PROCEDURE — 25010000002 PERFLUTREN (DEFINITY) 8.476 MG IN SODIUM CHLORIDE 0.9 % 10 ML INJECTION: Performed by: HOSPITALIST

## 2019-02-07 PROCEDURE — 25010000002 KETOROLAC TROMETHAMINE PER 15 MG: Performed by: NURSE PRACTITIONER

## 2019-02-07 PROCEDURE — 82962 GLUCOSE BLOOD TEST: CPT

## 2019-02-07 PROCEDURE — 93306 TTE W/DOPPLER COMPLETE: CPT

## 2019-02-07 PROCEDURE — 85025 COMPLETE CBC W/AUTO DIFF WBC: CPT | Performed by: NURSE PRACTITIONER

## 2019-02-07 PROCEDURE — 93306 TTE W/DOPPLER COMPLETE: CPT | Performed by: INTERNAL MEDICINE

## 2019-02-07 PROCEDURE — 25010000002 LORAZEPAM PER 2 MG: Performed by: NURSE PRACTITIONER

## 2019-02-07 RX ORDER — MAGNESIUM SULFATE HEPTAHYDRATE 40 MG/ML
2 INJECTION, SOLUTION INTRAVENOUS AS NEEDED
Status: DISCONTINUED | OUTPATIENT
Start: 2019-02-07 | End: 2019-02-07 | Stop reason: HOSPADM

## 2019-02-07 RX ORDER — TRAMADOL HYDROCHLORIDE 50 MG/1
50 TABLET ORAL EVERY 6 HOURS PRN
Status: DISCONTINUED | OUTPATIENT
Start: 2019-02-07 | End: 2019-02-07 | Stop reason: HOSPADM

## 2019-02-07 RX ORDER — KETOROLAC TROMETHAMINE 30 MG/ML
15 INJECTION, SOLUTION INTRAMUSCULAR; INTRAVENOUS EVERY 6 HOURS PRN
Status: DISCONTINUED | OUTPATIENT
Start: 2019-02-07 | End: 2019-02-07 | Stop reason: HOSPADM

## 2019-02-07 RX ORDER — MAGNESIUM SULFATE HEPTAHYDRATE 40 MG/ML
4 INJECTION, SOLUTION INTRAVENOUS AS NEEDED
Status: DISCONTINUED | OUTPATIENT
Start: 2019-02-07 | End: 2019-02-07 | Stop reason: HOSPADM

## 2019-02-07 RX ORDER — POTASSIUM CHLORIDE 20 MEQ/1
TABLET, EXTENDED RELEASE ORAL
Status: COMPLETED
Start: 2019-02-07 | End: 2019-02-07

## 2019-02-07 RX ORDER — SODIUM CHLORIDE, SODIUM LACTATE, POTASSIUM CHLORIDE, CALCIUM CHLORIDE 600; 310; 30; 20 MG/100ML; MG/100ML; MG/100ML; MG/100ML
1000 INJECTION, SOLUTION INTRAVENOUS ONCE
Status: COMPLETED | OUTPATIENT
Start: 2019-02-07 | End: 2019-02-07

## 2019-02-07 RX ORDER — KETOROLAC TROMETHAMINE 30 MG/ML
15 INJECTION, SOLUTION INTRAMUSCULAR; INTRAVENOUS EVERY 6 HOURS PRN
Status: DISCONTINUED | OUTPATIENT
Start: 2019-02-07 | End: 2019-02-07

## 2019-02-07 RX ADMIN — PERFLUTREN 2 ML: 6.52 INJECTION, SUSPENSION INTRAVENOUS at 07:30

## 2019-02-07 RX ADMIN — POTASSIUM CHLORIDE 20 MEQ: 1500 TABLET, EXTENDED RELEASE ORAL at 14:01

## 2019-02-07 RX ADMIN — KETOROLAC TROMETHAMINE 15 MG: 30 INJECTION, SOLUTION INTRAMUSCULAR; INTRAVENOUS at 10:12

## 2019-02-07 RX ADMIN — SODIUM CHLORIDE 5.2 UNITS/HR: 9 INJECTION, SOLUTION INTRAVENOUS at 06:04

## 2019-02-07 RX ADMIN — LORAZEPAM 1 MG: 2 INJECTION INTRAMUSCULAR; INTRAVENOUS at 08:08

## 2019-02-07 RX ADMIN — TRAMADOL HYDROCHLORIDE 50 MG: 50 TABLET, FILM COATED ORAL at 14:00

## 2019-02-07 RX ADMIN — POTASSIUM CHLORIDE 20 MEQ: 1.5 POWDER, FOR SOLUTION ORAL at 06:03

## 2019-02-07 RX ADMIN — FAMOTIDINE 20 MG: 10 INJECTION, SOLUTION INTRAVENOUS at 08:08

## 2019-02-07 RX ADMIN — DEXTROSE MONOHYDRATE, SODIUM CHLORIDE, AND POTASSIUM CHLORIDE 150 ML/HR: 50; 4.5; 1.49 INJECTION, SOLUTION INTRAVENOUS at 09:43

## 2019-02-07 RX ADMIN — DEXTROSE MONOHYDRATE, SODIUM CHLORIDE, AND POTASSIUM CHLORIDE 150 ML/HR: 50; 4.5; 1.49 INJECTION, SOLUTION INTRAVENOUS at 03:49

## 2019-02-07 RX ADMIN — IPRATROPIUM BROMIDE AND ALBUTEROL SULFATE 3 ML: .5; 3 SOLUTION RESPIRATORY (INHALATION) at 11:54

## 2019-02-07 RX ADMIN — MAGNESIUM SULFATE HEPTAHYDRATE 4 G: 40 INJECTION, SOLUTION INTRAVENOUS at 09:43

## 2019-02-07 RX ADMIN — IPRATROPIUM BROMIDE AND ALBUTEROL SULFATE 3 ML: .5; 3 SOLUTION RESPIRATORY (INHALATION) at 08:02

## 2019-02-07 RX ADMIN — GUAIFENESIN 600 MG: 600 TABLET, EXTENDED RELEASE ORAL at 08:21

## 2019-02-07 RX ADMIN — POTASSIUM CHLORIDE 20 MEQ: 1.5 POWDER, FOR SOLUTION ORAL at 01:05

## 2019-02-07 RX ADMIN — SODIUM PHOSPHATE, MONOBASIC, MONOHYDRATE 45 MMOL: 276; 142 INJECTION, SOLUTION INTRAVENOUS at 14:59

## 2019-02-07 RX ADMIN — LORAZEPAM 1 MG: 2 INJECTION INTRAMUSCULAR; INTRAVENOUS at 02:04

## 2019-02-07 RX ADMIN — SODIUM CHLORIDE, PRESERVATIVE FREE 3 ML: 5 INJECTION INTRAVENOUS at 08:08

## 2019-02-07 RX ADMIN — LORAZEPAM 1 MG: 2 INJECTION INTRAMUSCULAR; INTRAVENOUS at 12:28

## 2019-02-07 RX ADMIN — SODIUM CHLORIDE, POTASSIUM CHLORIDE, SODIUM LACTATE AND CALCIUM CHLORIDE 1000 ML: 600; 310; 30; 20 INJECTION, SOLUTION INTRAVENOUS at 10:12

## 2019-02-07 NOTE — NURSING NOTE
"Pt states that he wants to go home. Told pt that he was still in DKA and that I would not advise it at this time, and he stated, \"Well I feel better and I am hungry, so I am ready to leave.\" Told the pt and his significant other that I would notify the physician, and to please not leave until I come back.     Called and notified RUBENS Tomas, and she states she's on her way to patients room.  "

## 2019-02-07 NOTE — PLAN OF CARE
Problem: Sepsis/Septic Shock (Adult)  Goal: Signs and Symptoms of Listed Potential Problems Will be Absent, Minimized or Managed (Sepsis/Septic Shock)  Outcome: Ongoing (interventions implemented as appropriate)   02/07/19 0414   Goal/Outcome Evaluation   Problems Assessed (Sepsis) all   Problems Present (Sepsis) glycemic control impaired;situational response       Problem: Fall Risk (Adult)  Goal: Identify Related Risk Factors and Signs and Symptoms  Outcome: Outcome(s) achieved Date Met: 02/07/19 02/07/19 0414   Fall Risk (Adult)   Related Risk Factors (Fall Risk) fatigue/slow reaction;gait/mobility problems;environment unfamiliar   Signs and Symptoms (Fall Risk) presence of risk factors     Goal: Absence of Fall  Outcome: Ongoing (interventions implemented as appropriate)   02/07/19 0414   Fall Risk (Adult)   Absence of Fall making progress toward outcome       Problem: Pneumonia (Adult)  Goal: Signs and Symptoms of Listed Potential Problems Will be Absent, Minimized or Managed (Pneumonia)  Outcome: Ongoing (interventions implemented as appropriate)   02/07/19 0414   Goal/Outcome Evaluation   Problems Assessed (Pneumonia) all   Problems Present (Pneumonia) fluid/electrolyte imbalance       Problem: Skin Injury Risk (Adult)  Goal: Identify Related Risk Factors and Signs and Symptoms  Outcome: Outcome(s) achieved Date Met: 02/07/19 02/07/19 0414   Skin Injury Risk (Adult)   Related Risk Factors (Skin Injury Risk) critical care admission;fluid intake inadequate;infection     Goal: Skin Health and Integrity  Outcome: Ongoing (interventions implemented as appropriate)   02/07/19 0414   Skin Injury Risk (Adult)   Skin Health and Integrity making progress toward outcome

## 2019-02-07 NOTE — NURSING NOTE
"Discharge Planning Assessment   Ani Dejesus     Patient Name: Myles Toledo  MRN: [id number]  Today's Date: 2/7/2019    Admit Date: 2/6/2019    Discharge Needs Assessment     Row Name 02/07/19 4584       Living Environment    Lives With  significant other    Name(s) of Who Lives With Patient  Sugey Riddle    Current Living Arrangements  home/apartment/condo    Primary Care Provided by  self    Provides Primary Care For  no one    Family Caregiver if Needed  significant other    Quality of Family Relationships  helpful;involved;supportive    Able to Return to Prior Arrangements  yes       Resource/Environmental Concerns    Resource/Environmental Concerns  none       Transition Planning    Patient/Family Anticipates Transition to  home with family    Transportation Anticipated  family or friend will provide       Discharge Needs Assessment    Readmission Within the Last 30 Days  no previous admission in last 30 days    Concerns to be Addressed  denies needs/concerns at this time;discharge planning;medication        Discharge Plan     Row Name 02/07/19 3902       Plan    Plan  Home when stable    Patient/Family in Agreement with Plan  yes    Plan Comments  Spoke with Mr Toledo at bedside.  He lives in a house in [place] with his significant other, Sugey.  He does not have home health, DME or oxygen.  He states \"I'm here because I'm hard headed.\"  He is independent with his ADL's and drives himself.  His pharmacy is [facility] in [place].  There are no issues with insurance covering the prescriptions - Mr Toledo just doesn't take he medicines he tells me.  \"I guess I'm going to have to change.\"  He does not have an advanced directive and has no interest in such.   Plan is home when stable.          Destination      No service coordination in this encounter.      Durable Medical Equipment      No service coordination in this encounter.      Dialysis/Infusion      No service coordination in this encounter.    "   Home Medical Care      No service coordination in this encounter.      Community Resources      No service coordination in this encounter.          Demographic Summary     Row Name 02/07/19 2859       General Information    Admission Type  inpatient    Arrived From  home    Referral Source  admission list    Reason for Consult  discharge planning    Preferred Language  English     Used During This Interaction  no       Contact Information    Permission Granted to Share Info With          Functional Status    No documentation.       Psychosocial    No documentation.       Abuse/Neglect    No documentation.       Legal    No documentation.       Substance Abuse    No documentation.       Patient Forms    No documentation.           Cecilia Hughes RN

## 2019-02-07 NOTE — PLAN OF CARE
Problem: Patient Care Overview  Goal: Plan of Care Review  Outcome: Ongoing (interventions implemented as appropriate)   02/07/19 1201   Coping/Psychosocial   Plan of Care Reviewed With patient   OTHER   Outcome Summary Received consult for DKA protocol. Advance diet as indicated, will follow for education needs.

## 2019-02-07 NOTE — CONSULTS
"Adult Nutrition  Assessment/PES    Patient Name:  Myles Toledo  YOB: 1965  MRN: [id number]  Admit Date:  2/6/2019    Assessment Date:  2/7/2019    Comments:  Advance diet as indicated per protocol.  If concern for ETOH withdrawal suggest add MVT, folic acid, and thiamine.   Will cont to follow and monitor.     Reason for Assessment     Row Name 02/07/19 1149          Reason for Assessment    Reason For Assessment  nurse/nurse practitioner consult     Diagnosis  diabetes diagnosis/complications DKA, PNA hx VATS empyema         Nutrition/Diet History     Row Name 02/07/19 1151          Nutrition/Diet History    Typical Food/Fluid Intake  Pt & SO resting in dark room. Awakens easily. NKFA, reports -230 so 10-15 # wt loss. Denies issue chew/swallowing. Cofirms typical 3 meals per day at home & does \"watch\" sugar.          Anthropometrics     Row Name 02/07/19 1152 02/07/19 0743       Anthropometrics    Height  --  172.7 cm (68\")    Weight  97.1 kg (214 lb 1.1 oz)  97.1 kg (214 lb)       Ideal Body Weight (IBW)    Ideal Body Weight (IBW) (kg)  --  70.89    % Ideal Body Weight  --  136.93       Usual Body Weight (UBW)    Weight Loss  unintentional 21#  --    Weight Loss Time Frame  since 8/2018 based on EMR data, method of wt not available. 8.9% loss  --       Body Mass Index (BMI)    BMI (kg/m2)  --  32.61    BMI Assessment  BMI 30-34.9: obesity grade I  --                                   Row Name 02/07/19 0402          Anthropometrics    Weight  97.1 kg (214 lb 1.1 oz)         Labs/Tests/Procedures/Meds     Row Name 02/07/19 1153          Labs/Procedures/Meds    Lab Results Reviewed  reviewed     Lab Results Comments  mg 1.6 L, phos 1.1 L, glu 149-181 H, Na 133 L        Diagnostic Tests/Procedures    Diagnostic Test/Procedure Reviewed  reviewed        Medications    Pertinent Medications Reviewed  reviewed     Pertinent Medications Comments  insulin           Estimated/Assessed Needs     Row " "Name 02/07/19 1154 02/07/19 0743       Calculation Measurements    Weight Used For Calculations  97.1 kg (214 lb 1.1 oz)  --    Height  --  172.7 cm (68\")       Estimated/Assessed Needs    Additional Documentation  Calorie Requirements (Group);Protein Requirements (Group);Fluid Requirements (Group)  --       Calorie Requirements    Estimated Calorie Need Method  [facility]  --    Estimated Calorie Requirement Comment  1943 kcal ( mifflin 1.2 )  219 gm CHO, 45 % kcal  --                                                                                       Protein Requirements    Est Protein Requirement Amount (gms/kg)  0.9 gm protein  --    Estimated Protein Requirements (gms/day)  87.39  --       Fluid Requirements    Estimated Fluid Requirements (mL/day)  1943  --    Estimated Fluid Requirement Method  RDA Method  --    RDA Method (mL)  1943  --              Nutrition Prescription Ordered     Row Name 02/07/19 1155          Nutrition Prescription PO    Current PO Diet  NPO         Evaluation of Received Nutrient/Fluid Intake     Row Name 02/07/19 1155 02/07/19 1154       Calculation Measurements    Weight Used For Calculations  --  97.1 kg (214 lb 1.1 oz)       Fluid Intake Evaluation    IV Fluid (mL)  1863 insufficent data  --       PO Evaluation    Number of Days PO Intake Evaluated  Insufficient Data  --    Row Name 02/07/19 0743          Calculation Measurements    Height  172.7 cm (68\")         Evaluation of Prescribed Nutrient/Fluid Intake     Row Name 02/07/19 1154 02/07/19 0743       Calculation Measurements    Weight Used For Calculations  97.1 kg (214 lb 1.1 oz)  --    Height  --  172.7 cm (68\")               Problem/Interventions:  Problem 1     Row Name 02/07/19 1159          Nutrition Diagnoses Problem 1    Problem 1  Altered Nutrition Related to Labs     Etiology (related to)  Medical Diagnosis     Signs/Symptoms (evidenced by)  Biochemical         Problem 2     Row Name 02/07/19 1159          " Nutrition Diagnoses Problem 2    Problem 2  Inadequate Intake/Infusion     Inadequate Intake Type  Oral     Etiology (related to)  Factors Affecting Nutrition     Signs/Symptoms (evidenced by)  Report/Observation;Unintended Weight Change     Unintended Weight Change  Loss     Number of Pounds Lost  21     Weight loss time period  6 months per EMR data               Intervention Goal     Row Name 02/07/19 1200          Intervention Goal    General  Improved nutrition related lab(s);Reduce/improve symptoms     PO  Establish PO     Weight  No significant weight loss         Nutrition Intervention     Row Name 02/07/19 1200          Nutrition Intervention    RD/Tech Action  Follow Tx progress         Nutrition Prescription     Row Name 02/07/19 1200          Other Orders    Other  Advance diet as indicated per DKA protocol         Education/Evaluation     Row Name 02/07/19 1200          Education    Education  Will Instruct as appropriate;Education not appropriate at this time        Monitor/Evaluation    Monitor  Per protocol;I&O;PO intake;Pertinent labs;Weight;Symptoms           Electronically signed by:  Sherine Angel RD  02/07/19 12:01 PM

## 2019-02-07 NOTE — PROGRESS NOTES
"SERVICE: [facility] HOSPITALIST    CONSULTANTS: Diabetic educator    CHIEF COMPLAINT: Follow-up DKA, left lower lobe pneumonia    SUBJECTIVE: The patient reports that he continues to feel anxious but gets relief with Ativan.  He notes breathing is much improved today.  He notes that he was not taking any insulins because \"I do not like to stick myself\".  Kaylene at bedside reports that she will administer insulins, counseled regarding patient need to participate in his own care.  He otherwise denies f/c/soa/chest pain/n/v/d/abdominal pain or other new concerns.    OBJECTIVE:    /91 (BP Location: Right arm, Patient Position: Lying)   Pulse 86   Temp 98.2 °F (36.8 °C) (Axillary)   Resp 18   Ht 172.7 cm (68\")   Wt 97.1 kg (214 lb 1.1 oz)   SpO2 97%   BMI 32.55 kg/m²     MEDS/LABS REVIEWED AND ORDERED    enoxaparin 40 mg Subcutaneous Q24H   famotidine 20 mg Intravenous Q12H   guaiFENesin 600 mg Oral BID   insulin regular 10 Units Intravenous Once   ipratropium-albuterol 3 mL Nebulization 4x Daily - RT   levoFLOXacin 750 mg Intravenous Q24H   sodium chloride 3 mL Intravenous Q12H     Physical Exam   Constitutional: He is oriented to person, place, and time. He appears well-developed and well-nourished.   HENT:   Head: Normocephalic and atraumatic.   Eyes: EOM are normal. Pupils are equal, round, and reactive to light.   Cardiovascular: Normal rate and regular rhythm.   Pulmonary/Chest: Effort normal.   Breath sounds absent LLL, scattered wheezes   Abdominal: Soft. Bowel sounds are normal. He exhibits no distension. There is no tenderness. There is no guarding.   Musculoskeletal: He exhibits no edema.   Neurological: He is alert and oriented to person, place, and time.   Skin: Skin is warm and dry.   Psychiatric:   Depressed affect   Vitals reviewed.    LAB/DIAGNOSTICS:    Lab Results (last 24 hours)     Procedure Component Value Units Date/Time    Magnesium [[id number]]  (Normal) Collected:  " 02/07/19 1214    Specimen:  Blood Updated:  02/07/19 1238     Magnesium 2.2 mg/dL     Basic Metabolic Panel [[id number]]  (Abnormal) Collected:  02/07/19 1214    Specimen:  Blood Updated:  02/07/19 1233     Glucose 189 mg/dL      BUN 4 mg/dL      Creatinine 0.43 mg/dL      Sodium 131 mmol/L      Potassium 3.4 mmol/L      Chloride 105 mmol/L      CO2 15.2 mmol/L      Calcium 8.4 mg/dL      eGFR Non African Amer >150 mL/min/1.73      BUN/Creatinine Ratio 9.3     Anion Gap 10.8 mmol/L     Narrative:       GFR Normal >60  Chronic Kidney Disease <60  Kidney Failure <15    Phosphorus [[id number]]  (Abnormal) Collected:  02/07/19 1214    Specimen:  Blood Updated:  02/07/19 1233     Phosphorus 1.0 mg/dL     POC Glucose Once [[id number]]  (Abnormal) Collected:  02/07/19 1201    Specimen:  Blood Updated:  02/07/19 1210     Glucose 204 mg/dL     POC Glucose Once [[id number]]  (Abnormal) Collected:  02/07/19 0949    Specimen:  Blood Updated:  02/07/19 0955     Glucose 166 mg/dL     Respiratory Culture - Sputum, Cough [[id number]] Collected:  02/06/19 1414    Specimen:  Sputum from Cough Updated:  02/07/19 0954     Gram Stain Rare (1+) Epithelial cells per low power field      Moderate (3+) WBCs seen      Few (2+) Mixed bacterial morphotypes seen on Gram Stain    MRSA Screen Culture - Swab, Nares [[id number]]  (Normal) Collected:  02/06/19 1650    Specimen:  Swab from Nares Updated:  02/07/19 0940     MRSA SCREEN CX No Methicillin Resistant Staphylococcus aureus isolated    Phosphorus [[id number]]  (Abnormal) Collected:  02/07/19 0828    Specimen:  Blood Updated:  02/07/19 0855     Phosphorus 1.1 mg/dL     Basic Metabolic Panel [[id number]]  (Abnormal) Collected:  02/07/19 0828    Specimen:  Blood Updated:  02/07/19 0855     Glucose 149 mg/dL      BUN 5 mg/dL      Creatinine 0.51 mg/dL      Sodium 133 mmol/L      Potassium 3.6 mmol/L      Chloride 107 mmol/L      CO2 13.9 mmol/L      Calcium 8.9 mg/dL      eGFR Non African Amer >150  mL/min/1.73      BUN/Creatinine Ratio 9.8     Anion Gap 12.1 mmol/L     Narrative:       GFR Normal >60  Chronic Kidney Disease <60  Kidney Failure <15    Magnesium [[id number]]  (Abnormal) Collected:  02/07/19 0828    Specimen:  Blood Updated:  02/07/19 0855     Magnesium 1.6 mg/dL     POC Glucose Once [[id number]]  (Abnormal) Collected:  02/07/19 0813    Specimen:  Blood Updated:  02/07/19 0827     Glucose 160 mg/dL     POC Glucose Once [[id number]]  (Abnormal) Collected:  02/07/19 0608    Specimen:  Blood Updated:  02/07/19 0624     Glucose 165 mg/dL     Phosphorus [[id number]]  (Abnormal) Collected:  02/07/19 0356    Specimen:  Blood Updated:  02/07/19 0453     Phosphorus 1.0 mg/dL     Basic Metabolic Panel [[id number]]  (Abnormal) Collected:  02/07/19 0356    Specimen:  Blood Updated:  02/07/19 0453     Glucose 160 mg/dL      BUN 6 mg/dL      Creatinine 0.52 mg/dL      Sodium 132 mmol/L      Potassium 3.6 mmol/L      Chloride 105 mmol/L      CO2 13.3 mmol/L      Calcium 8.6 mg/dL      eGFR Non African Amer >150 mL/min/1.73      BUN/Creatinine Ratio 11.5     Anion Gap 13.7 mmol/L     Narrative:       GFR Normal >60  Chronic Kidney Disease <60  Kidney Failure <15    Magnesium [[id number]]  (Normal) Collected:  02/07/19 0356    Specimen:  Blood Updated:  02/07/19 0453     Magnesium 1.7 mg/dL     CBC & Differential [[id number]] Collected:  02/07/19 0356    Specimen:  Blood Updated:  02/07/19 0439    Narrative:       The following orders were created for panel order CBC & Differential.  Procedure                               Abnormality         Status                     ---------                               -----------         ------                     CBC Auto Differential[[id number]]        Abnormal            Final result                 Please view results for these tests on the individual orders.    CBC Auto Differential [[id number]]  (Abnormal) Collected:  02/07/19 0356    Specimen:  Blood Updated:  02/07/19 0439      WBC 22.45 10*3/mm3      RBC 4.40 10*6/mm3      Hemoglobin 12.3 g/dL      Hematocrit 34.7 %      MCV 78.9 fL      MCH 28.0 pg      MCHC 35.4 g/dL      RDW 12.5 %      RDW-SD 35.6 fl      MPV 8.7 fL      Platelets 506 10*3/mm3      Neutrophil % 79.3 %      Lymphocyte % 8.9 %      Monocyte % 10.1 %      Eosinophil % 0.1 %      Basophil % 0.2 %      Immature Grans % 1.4 %      Neutrophils, Absolute 17.81 10*3/mm3      Lymphocytes, Absolute 1.99 10*3/mm3      Monocytes, Absolute 2.27 10*3/mm3      Eosinophils, Absolute 0.02 10*3/mm3      Basophils, Absolute 0.05 10*3/mm3      Immature Grans, Absolute 0.31 10*3/mm3      nRBC 0.0 /100 WBC     POC Glucose Once [[id number]]  (Abnormal) Collected:  02/07/19 0355    Specimen:  Blood Updated:  02/07/19 0413     Glucose 166 mg/dL     Blood Culture - Blood, Hand, Right [[id number]] Collected:  02/06/19 1428    Specimen:  Blood from Hand, Right Updated:  02/07/19 0246     Blood Culture No growth at less than 24 hours    POC Glucose Once [[id number]]  (Abnormal) Collected:  02/07/19 0157    Specimen:  Blood Updated:  02/07/19 0230     Glucose 177 mg/dL     POC Glucose Once [[id number]]  (Abnormal) Collected:  02/07/19 0108    Specimen:  Blood Updated:  02/07/19 0121     Glucose 170 mg/dL     Blood Culture - Blood, Arm, Left [[id number]] Collected:  02/06/19 1259    Specimen:  Blood from Arm, Left Updated:  02/07/19 0115     Blood Culture No growth at less than 24 hours    Phosphorus [[id number]]  (Abnormal) Collected:  02/07/19 0008    Specimen:  Blood Updated:  02/07/19 0039     Phosphorus 0.8 mg/dL     Basic Metabolic Panel [[id number]]  (Abnormal) Collected:  02/07/19 0008    Specimen:  Blood Updated:  02/07/19 0039     Glucose 168 mg/dL      BUN 7 mg/dL      Creatinine 0.59 mg/dL      Sodium 131 mmol/L      Potassium 3.7 mmol/L      Chloride 104 mmol/L      CO2 14.4 mmol/L      Calcium 8.7 mg/dL      eGFR Non African Amer 143 mL/min/1.73      BUN/Creatinine Ratio 11.9     Anion  Gap 12.6 mmol/L     Narrative:       GFR Normal >60  Chronic Kidney Disease <60  Kidney Failure <15    Magnesium [[id number]]  (Normal) Collected:  02/07/19 0008    Specimen:  Blood Updated:  02/07/19 0039     Magnesium 1.7 mg/dL     POC Glucose Once [[id number]]  (Abnormal) Collected:  02/07/19 0008    Specimen:  Blood Updated:  02/07/19 0020     Glucose 169 mg/dL     POC Glucose Once [[id number]]  (Abnormal) Collected:  02/06/19 2310    Specimen:  Blood Updated:  02/06/19 2338     Glucose 163 mg/dL     POC Glucose Once [[id number]]  (Abnormal) Collected:  02/06/19 2210    Specimen:  Blood Updated:  02/06/19 2217     Glucose 178 mg/dL     Blood Gas, Arterial [[id number]]  (Abnormal) Collected:  02/06/19 2200    Specimen:  Arterial Blood Updated:  02/06/19 2203     Site Right Brachial     Mike's Test N/A     pH, Arterial 7.403 pH units      pCO2, Arterial 20.3 mm Hg      Comment: 84 Value below reference range        pO2, Arterial 71.2 mm Hg      Comment: 84 Value below reference range        HCO3, Arterial 12.7 mmol/L      Comment: 84 Value below reference range        Base Excess, Arterial -9.8 mmol/L      Comment: 84 Value below reference range        O2 Saturation, Arterial 96.9 %      Hemoglobin, Blood Gas 13.1 g/dL      Comment: 84 Value below reference range        Temperature 37.0 C      Barometric Pressure for Blood Gas 737 mmHg      Modality Room Air     Ventilator Mode NA     Comment: Meter: [id number]     :  [id number]        pCO2, Temperature Corrected 20.3 mm Hg      pH, Temp Corrected 7.403 pH Units      pO2, Temperature Corrected 71.2 mm Hg     Respiratory Panel, PCR - Swab, Nasopharynx [[id number]]  (Normal) Collected:  02/06/19 1650    Specimen:  Swab from Nasopharynx Updated:  02/06/19 2157     ADENOVIRUS, PCR Not Detected     Coronavirus 229E Not Detected     Coronavirus HKU1 Not Detected     Coronavirus NL63 Not Detected     Coronavirus OC43 Not Detected     Human Metapneumovirus Not  Detected     Human Rhinovirus/Enterovirus Not Detected     Influenza B PCR Not Detected     Parainfluenza Virus 1 Not Detected     Parainfluenza Virus 2 Not Detected     Parainfluenza Virus 3 Not Detected     Parainfluenza Virus 4 Not Detected     Bordetella pertussis pcr Not Detected     Influenza A H1 2009 PCR Not Detected     Chlamydophila pneumoniae PCR Not Detected     Mycoplasma pneumo by PCR Not Detected     Influenza A PCR Not Detected     Influenza A H3 Not Detected     Influenza A H1 Not Detected     RSV, PCR Not Detected     Bordetella parapertussis PCR Not Detected    POC Glucose Once [[id number]]  (Abnormal) Collected:  02/06/19 2109    Specimen:  Blood Updated:  02/06/19 2115     Glucose 169 mg/dL     Phosphorus [[id number]]  (Abnormal) Collected:  02/06/19 2025    Specimen:  Blood Updated:  02/06/19 2052     Phosphorus 1.1 mg/dL     Basic Metabolic Panel [[id number]]  (Abnormal) Collected:  02/06/19 2025    Specimen:  Blood Updated:  02/06/19 2052     Glucose 181 mg/dL      BUN 7 mg/dL      Creatinine 0.72 mg/dL      Sodium 133 mmol/L      Potassium 3.7 mmol/L      Chloride 103 mmol/L      CO2 11.0 mmol/L      Calcium 9.0 mg/dL      eGFR Non African Amer 114 mL/min/1.73      BUN/Creatinine Ratio 9.7     Anion Gap 19.0 mmol/L     Narrative:       GFR Normal >60  Chronic Kidney Disease <60  Kidney Failure <15    Magnesium [[id number]]  (Normal) Collected:  02/06/19 2025    Specimen:  Blood Updated:  02/06/19 2052     Magnesium 1.7 mg/dL     POC Glucose Once [[id number]]  (Abnormal) Collected:  02/06/19 2010    Specimen:  Blood Updated:  02/06/19 2017     Glucose 201 mg/dL     POC Glucose Once [[id number]]  (Abnormal) Collected:  02/06/19 1907    Specimen:  Blood Updated:  02/06/19 1920     Glucose 316 mg/dL     Urinalysis, Microscopic Only - Urine, Clean Catch [[id number]]  (Abnormal) Collected:  02/06/19 1804    Specimen:  Urine, Clean Catch Updated:  02/06/19 1859     RBC, UA 3-5 /HPF      WBC, UA None Seen  /HPF      Bacteria, UA None Seen /HPF      Squamous Epithelial Cells, UA 0-2 /HPF      Hyaline Casts, UA None Seen /LPF      Methodology Manual Light Microscopy    Urinalysis With Culture If Indicated - Urine, Clean Catch [[id number]]  (Abnormal) Collected:  02/06/19 1804    Specimen:  Urine, Clean Catch Updated:  02/06/19 1843     Color, UA Straw     Appearance, UA Clear     pH, UA <=5.0     Specific Gravity, UA 1.020     Glucose,  mg/dL (2+)     Ketones, UA >=160 mg/dL (4+)     Bilirubin, UA Negative     Blood, UA Small (1+)     Protein,  mg/dL (2+)     Leuk Esterase, UA Negative     Nitrite, UA Negative     Urobilinogen, UA 0.2 E.U./dL    S. Pneumo Ag Urine or CSF - Urine, Urine, Clean Catch [[id number]]  (Normal) Collected:  02/06/19 1804    Specimen:  Urine, Clean Catch Updated:  02/06/19 1843     Strep Pneumo Ag Negative    Legionella Antigen, Urine - Urine, Urine, Clean Catch [[id number]]  (Normal) Collected:  02/06/19 1804    Specimen:  Urine, Clean Catch Updated:  02/06/19 1841     LEGIONELLA ANTIGEN, URINE Negative    Lactic Acid, Reflex [[id number]]  (Abnormal) Collected:  02/06/19 1732    Specimen:  Blood Updated:  02/06/19 1801     Lactate 3.4 mmol/L     Comprehensive Metabolic Panel [[id number]]  (Abnormal) Collected:  02/06/19 1732    Specimen:  Blood Updated:  02/06/19 1800     Glucose 375 mg/dL      BUN 8 mg/dL      Creatinine 0.93 mg/dL      Sodium 131 mmol/L      Potassium 4.2 mmol/L      Chloride 100 mmol/L      CO2 6.1 mmol/L      Calcium 8.8 mg/dL      Total Protein 8.0 g/dL      Albumin 3.40 g/dL      ALT (SGPT) 5 U/L      AST (SGOT) 6 U/L      Alkaline Phosphatase 190 U/L      Total Bilirubin 0.2 mg/dL      eGFR Non African Amer 85 mL/min/1.73      Globulin 4.6 gm/dL      A/G Ratio 0.7 g/dL      BUN/Creatinine Ratio 8.6     Anion Gap 24.9 mmol/L     Phosphorus [[id number]]  (Abnormal) Collected:  02/06/19 1732    Specimen:  Blood Updated:  02/06/19 1800     Phosphorus 2.1 mg/dL      Magnesium [[id number]]  (Normal) Collected:  02/06/19 1732    Specimen:  Blood Updated:  02/06/19 1800     Magnesium 1.9 mg/dL     Blood Gas, Arterial [[id number]]  (Abnormal) Collected:  02/06/19 1750    Specimen:  Arterial Blood Updated:  02/06/19 1757     Site Left Radial     Mike's Test Positive     pH, Arterial 7.245 pH units      Comment: 84 Value below reference range        pCO2, Arterial 12.4 mm Hg      Comment: 85 Value below critical limit        pO2, Arterial 101.0 mm Hg      HCO3, Arterial 5.4 mmol/L      Comment: 84 Value below reference range        Base Excess, Arterial -19.1 mmol/L      Comment: 84 Value below reference range        O2 Saturation, Arterial 98.2 %      Hemoglobin, Blood Gas 14.1 g/dL      Temperature 37.0 C      Barometric Pressure for Blood Gas 737 mmHg      Modality Room Air     Ventilator Mode NA     Comment: Meter: [id number]     :  [id number]        Notified Who rb and v Dr pruitt     Notified By [id number]     Notified Time 02/06/2019 17:58     pCO2, Temperature Corrected 12.4 mm Hg      pH, Temp Corrected 7.245 pH Units      pO2, Temperature Corrected 101 mm Hg     POC Glucose Once [[id number]]  (Abnormal) Collected:  02/06/19 1746    Specimen:  Blood Updated:  02/06/19 1755     Glucose 390 mg/dL     CBC & Differential [[id number]] Collected:  02/06/19 1732    Specimen:  Blood Updated:  02/06/19 1741    Narrative:       The following orders were created for panel order CBC & Differential.  Procedure                               Abnormality         Status                     ---------                               -----------         ------                     CBC Auto Differential[[id number]]        Abnormal            Final result                 Please view results for these tests on the individual orders.    CBC Auto Differential [[id number]]  (Abnormal) Collected:  02/06/19 1732    Specimen:  Blood Updated:  02/06/19 1741     WBC 24.55 10*3/mm3      RBC 4.99 10*6/mm3       Hemoglobin 13.8 g/dL      Hematocrit 41.4 %      MCV 83.0 fL      MCH 27.7 pg      MCHC 33.3 g/dL      RDW 12.7 %      RDW-SD 38.5 fl      MPV 8.6 fL      Platelets 515 10*3/mm3      Neutrophil % 85.5 %      Lymphocyte % 3.7 %      Monocyte % 7.1 %      Eosinophil % 0.0 %      Basophil % 0.3 %      Immature Grans % 3.4 %      Neutrophils, Absolute 20.98 10*3/mm3      Lymphocytes, Absolute 0.91 10*3/mm3      Monocytes, Absolute 1.75 10*3/mm3      Eosinophils, Absolute 0.00 10*3/mm3      Basophils, Absolute 0.08 10*3/mm3      Immature Grans, Absolute 0.83 10*3/mm3      nRBC 0.0 /100 WBC     Lactic Acid, Reflex Timer (This will reflex a repeat order 3-3:15 hours after ordered.) [[id number]] Collected:  02/06/19 1313    Specimen:  Blood Updated:  02/06/19 1645     Extra Tube Hold for add-ons.     Comment: Auto resulted.       POC Glucose Once [[id number]]  (Abnormal) Collected:  02/06/19 1627    Specimen:  Blood Updated:  02/06/19 1636     Glucose 458 mg/dL     Procalcitonin [[id number]]  (Abnormal) Collected:  02/06/19 1259    Specimen:  Blood Updated:  02/06/19 1618     Procalcitonin 0.03 ng/mL     Narrative:       As a Marker for Sepsis (Non-Neonates):   1. <0.5 ng/mL represents a low risk of severe sepsis and/or septic shock.  2. >2 ng/mL represents a high risk of severe sepsis and/or septic shock.    As a Marker for Lower Respiratory Tract Infections that require antibiotic therapy:    PCT on Admission     Antibiotic Therapy       6-12 Hrs later  > 0.5                Strongly Recommended             >0.25 - <0.5         Recommended  0.1 - 0.25           Discouraged              Remeasure/reassess PCT  <0.1                 Strongly Discouraged     Remeasure/reassess PCT                     PCT values of < 0.5 ng/mL do not exclude an infection, because localized infections (without systemic signs) may be associated with such low concentrations, or a systemic infection in its initial stages (< 6 hours). Furthermore,  increased PCT can occur without infection. PCT concentrations between 0.5 and 2.0 ng/mL should be interpreted taking into account the patient's history. It is recommended to retest PCT within 6-24 hours if any concentrations < 2 ng/mL are obtained.    Hemoglobin A1c [[id number]]  (Abnormal) Collected:  02/06/19 1259    Specimen:  Blood Updated:  02/06/19 1609     Hemoglobin A1C 14.40 %     Narrative:       Hemoglobin A1C Ranges:    Increased Risk for Diabetes  5.7% to 6.4%  Diabetes                     >= 6.5%  Diabetic Goal                < 7.0%    Ketone Bodies, Serum (Not performed at [facility]) [[id number]] Collected:  02/06/19 1259    Specimen:  Blood Updated:  02/06/19 1502    Narrative:       The following orders were created for panel order Ketone Bodies, Serum (Not performed at [facility]).  Procedure                               Abnormality         Status                     ---------                               -----------         ------                     Acetone[[id number]]                      Abnormal            Final result                 Please view results for these tests on the individual orders.    Acetone [[id number]]  (Abnormal) Collected:  02/06/19 1259    Specimen:  Blood Updated:  02/06/19 1502     Acetone Small    BNP [[id number]]  (Abnormal) Collected:  02/06/19 1259    Specimen:  Blood Updated:  02/06/19 1417     proBNP 358.7 pg/mL     Narrative:       Among patients with dyspnea, NT-proBNP is highly sensitive for the detection of acute congestive heart failure. In addition NT-proBNP of <300 pg/ml effectively rules out acute congestive heart failure with 99% negative predictive value.    [place] Draw [[id number]] Collected:  02/06/19 1259    Specimen:  Blood Updated:  02/06/19 1415    Narrative:       The following orders were created for panel order [place] Draw.  Procedure                               Abnormality         Status                     ---------                                -----------         ------                     Light Blue Top[[id number]]                                   Final result               Green Top (Gel)[[id number]]                                  Final result               Lavender Top[[id number]]                                     Final result               Gold Top - SST[[id number]]                                   Final result                 Please view results for these tests on the individual orders.    Gold Top - SST [[id number]] Collected:  02/06/19 1301    Specimen:  Blood Updated:  02/06/19 1415     Extra Tube Hold for add-ons.     Comment: Auto resulted.       Blood Gas, Arterial [[id number]] Collected:  02/06/19 1359    Specimen:  Arterial Blood Updated:  02/06/19 1404    Light Blue Top [[id number]] Collected:  02/06/19 1300    Specimen:  Blood Updated:  02/06/19 1400     Extra Tube hold for add-on     Comment: Auto resulted       Green Top (Gel) [[id number]] Collected:  02/06/19 1300    Specimen:  Blood Updated:  02/06/19 1400     Extra Tube Hold for add-ons.     Comment: Auto resulted.       Lavender Top [[id number]] Collected:  02/06/19 1259    Specimen:  Blood Updated:  02/06/19 1400     Extra Tube hold for add-on     Comment: Auto resulted       CBC & Differential [[id number]] Collected:  02/06/19 1259    Specimen:  Blood Updated:  02/06/19 1351    Narrative:       The following orders were created for panel order CBC & Differential.  Procedure                               Abnormality         Status                     ---------                               -----------         ------                     CBC Auto Differential[[id number]]        Abnormal            Final result                 Please view results for these tests on the individual orders.    CBC Auto Differential [[id number]]  (Abnormal) Collected:  02/06/19 1259    Specimen:  Blood Updated:  02/06/19 1351     WBC 24.26 10*3/mm3      RBC 5.50 10*6/mm3      Hemoglobin 15.3 g/dL      Hematocrit  46.0 %      MCV 83.6 fL      MCH 27.8 pg      MCHC 33.3 g/dL      RDW 12.5 %      RDW-SD 38.3 fl      MPV 9.1 fL      Platelets 595 10*3/mm3      Neutrophil % 81.1 %      Lymphocyte % 7.9 %      Monocyte % 7.8 %      Eosinophil % 0.0 %      Basophil % 0.4 %      Immature Grans % 2.8 %      Neutrophils, Absolute 19.68 10*3/mm3      Lymphocytes, Absolute 1.91 10*3/mm3      Monocytes, Absolute 1.90 10*3/mm3      Eosinophils, Absolute 0.00 10*3/mm3      Basophils, Absolute 0.09 10*3/mm3      Immature Grans, Absolute 0.68 10*3/mm3      nRBC 0.0 /100 WBC     Lactic Acid, Plasma [[id number]]  (Abnormal) Collected:  02/06/19 1313    Specimen:  Blood Updated:  02/06/19 1339     Lactate 2.1 mmol/L     Troponin [[id number]]  (Normal) Collected:  02/06/19 1300    Specimen:  Blood Updated:  02/06/19 1325     Troponin T <0.010 ng/mL     Narrative:       Troponin T Reference Range:  <= 0.03 ng/mL-   Negative for AMI  >0.03 ng/mL-     Abnormal for myocardial necrosis.  Clinicians would have to utilize clinical acumen, EKG, Troponin and serial changes to determine if it is an Acute Myocardial Infarction or myocardial injury due to an underlying chronic condition.     Comprehensive Metabolic Panel [[id number]]  (Abnormal) Collected:  02/06/19 1300    Specimen:  Blood Updated:  02/06/19 1323     Glucose 306 mg/dL      BUN 7 mg/dL      Creatinine 0.92 mg/dL      Sodium 129 mmol/L      Potassium 4.4 mmol/L      Chloride 94 mmol/L      CO2 6.2 mmol/L      Calcium 9.5 mg/dL      Total Protein 9.3 g/dL      Albumin 3.70 g/dL      ALT (SGPT) 6 U/L      AST (SGOT) 8 U/L      Alkaline Phosphatase 215 U/L      Total Bilirubin 0.4 mg/dL      eGFR Non African Amer 86 mL/min/1.73      Globulin 5.6 gm/dL      A/G Ratio 0.7 g/dL      BUN/Creatinine Ratio 7.6     Anion Gap 28.8 mmol/L         ECG 12 Lead   Final Result   HEART RATE= 114  bpm   RR Interval= 524  ms   CA Interval= 149  ms   P Horizontal Axis= 45  deg   P Front Axis= 66  deg   QRSD  Interval= 111  ms   QT Interval= 338  ms   QRS Axis= -25  deg   T Wave Axis= 26  deg   - OTHERWISE NORMAL ECG -   Sinus tachycardia   Borderline left axis deviation   No change from prior tracing   Electronically Signed By: Edita Douglas ([facility]) 06-Feb-2019 16:47:07   Date and Time of Study: 2019-02-06 12:39:27      ECG 12 Lead   ED Interpretation   Alfonso Phan MD     2/6/2019  2:59 PM   ECG 12 Lead      Date/Time: 2/6/2019 12:39 PM   Performed by: Alfonso Phan MD   Authorized by: Alfonso Phan MD    Comments: EKG performed 12:39, EKG read 12:41.  Sinus tachycardia with a    heart rate of 114, left axis deviation, no acute ST segment elevation or    depression consistent with ischemia, no ectopy, normal PA and QT    intervals.           Results for orders placed during the hospital encounter of 02/06/19   Adult Transthoracic Echo Complete W/ Cont if Necessary Per Protocol    Narrative · Estimated EF = 48%.  · Left ventricular systolic function is low normal.        Xr Chest 1 View    Result Date: 2/7/2019  Left lower lobe infiltrate unchanged since yesterday.  This report was finalized on 2/7/2019 8:03 AM by Dr. Julián Brand MD.      Xr Chest 1 View    Result Date: 2/6/2019  1. Patchy interstitial and alveolar opacities in the left base suspicious for pneumonia. Follow-up to clearing recommended. 2. Improved inspiratory result with near complete resolution of the left-sided effusion since the prior study. Residual trace to small left effusion or chronic pleural reaction blunting the left CP angle.  This report was finalized on 2/6/2019 2:06 PM by Dr. Rashad Silva MD.      ASSESSMENT/PLAN:  DKA in obese:  Gap closed but CO2 remains low, bolus with LR now and repeat as needed  Electrolyte replacement protocols in place or DKA protocol  Diabetic educator to see    Sepsis secondary to left lower lobe pneumonia:  Continue Levaquin, Mucinex, DuoNeb nebs, hilario cappella, I S, ambulation as able  White  blood cell count/platelets trending down  Monitor     Low normal EF: 48% per echo, likely related to above  Troponin negative on admit  No chest pain or cardiac symptoms  Appears euvolemic on exam  Monitor with IVFs    Hyponatremia: secondary to glucose, continue hydration    Anxiety: ativan as needed    Noncompliance:  Reports that he does not like to stick himself with needles and was not using prescribed insulins    Hyperventilation: resolved, secondary to above    PLAN FOR DISPOSITION: home when able    RUBENS Hoawrd  Hospitalist, [facility]  02/07/19  7:02 AM

## 2019-02-07 NOTE — PLAN OF CARE
"Called by staff regarding patient wanting to be discharged. D/W patient and \"fiance\" at bedside that it might be life threatening for him to leave right now and that although he feels better and he is improving by lab results, he is still in serious condition and recommend continuing current care. Explained that this APRN will not discharge him at this time. Asked the patient if he had any SI/HI and he and fiance deny. Again reiterated importance of staying in hospital at this time. Patient agreed to consider. Will contact spiritual care to  as patient's affect seems depressed. Fiance asks \"well can I bring him back if I need to?\" Discussed need to stay rather than leaving AMA and coming back to ER. All risks discussed including death. Patient and fiance verbalized understanding.   "

## 2019-02-07 NOTE — PAYOR COMM NOTE
"Luz Maria Echavarria (53 y.o. Male)     ATTN: OLIVE  MEMBER ID:[id number]  [facility]. PLEASE REPLY TO HENRIETTA STOKES AT FAX#[phone] OR PHONE#[phone]. THANK YOU.       Date of Birth Social Security Number Address Home Phone MRN    [id number]  [address] [place] [address]  [id number]    Sikh Marital Status          None Legally        Admission Date Admission Type Admitting Provider Attending Provider Department, Room/Bed    2/6/19 Emergency Gage Agee MD Ellis, Rusty T, MD [facility] ICU, ICU7/1    Discharge Date Discharge Disposition Discharge Destination                       Attending Provider:  Gage Agee MD    Allergies:  Penicillins    Isolation:  None   Infection:  None   Code Status:  CPR    Ht:  172.7 cm (68\")   Wt:  97.1 kg (214 lb 1.1 oz)    Admission Cmt:  None   Principal Problem:  None                Active Insurance as of 2/6/2019     Primary Coverage     Payor Plan Insurance Group Employer/Plan Group    PASSPORT PASSPORT MEDICAID     Payor Plan Address Payor Plan Phone Number Payor Plan Fax Number Effective Dates    [address] [phone]  10/1/2015 - None Entered    [facility] [id number]       Subscriber Name Subscriber Birth Date Member ID       LUZ MARIA ECHAVARRIA 1965 [id number]                 Emergency Contacts      (Rel.) Home Phone Work Phone Mobile Phone    Sugey Riddle (Significant Other) -- -- [phone]               History & Physical      Tammy To, RUBENS at 2/6/2019  3:35 PM     Attestation signed by Gage Agee MD at 2/6/2019  6:57 PM    I have seen and examined Mr. Echavarria, and I have verified practitioner's findings.  I agree w/ diagnoses and plan as documented.                  [facility] HOSPITALIST     Provider, No Known    CHIEF COMPLAINT: Cough, shortness of air, fever    HISTORY OF PRESENT ILLNESS:  The patient is a 53-year-old male that presented to the " emergency department secondary to 7 days of increased cough productive of brownish sputum, shortness of air, fever that worsened over the past 3 days.  He notes unknown amount of weight loss recently due to inability to eat with shortness of air.  Significant other at bedside and notes she has had upper respiratory symptoms recently as well.      He reports history of DKA in the past, unsure regarding sugars or A1C. H/O VATS LLL secondary to empyema 7/2018. He notes no pulmonary issues since that time.     Denies headache/rhinorrhea/nasal congestion/lightheadedness/syncopal sensation/n/v/d/chest pain/abdominal pain/recent illness/change in bowel or bladder habits/bloody emesis or bloody stools/change in medications/tobacco use or any other new concerns.    Past Medical History:   Diagnosis Date   • Diabetes mellitus (CMS/HCC)    • Hypertension      Past Surgical History:   Procedure Laterality Date   • CHEST TUBE INSERTION     • EYE SURGERY     • INNER EAR SURGERY     • THORACOSCOPY Left 7/9/2018    Procedure: BRONCHOSCOPY, LEFT VIDEO ASSISTED THORACOSCOPY WITH DECORITCATION, INTERCOSTAL NERVE BLOCK WITH CRYO ABLATION;  Surgeon: Thomas Oliver III, MD;  Location: [facility];  Service: Thoracic     History reviewed. No pertinent family history.  Social History     Tobacco Use   • Smoking status: Never Smoker   • Smokeless tobacco: Never Used   Substance Use Topics   • Alcohol use: Yes     Comment: occ   • Drug use: No     Medications Prior to Admission   Medication Sig Dispense Refill Last Dose   • BABY ASPIRIN PO Take 1 tablet by mouth Daily.   Past Month at Unknown time   • celecoxib (CELEBREX) 200 MG capsule Take 1 capsule by mouth Daily. 30 capsule 1 Not Taking   • ondansetron (ZOFRAN) 8 MG tablet Take 1 tablet by mouth Every 8 (Eight) Hours As Needed for Nausea or Vomiting. 30 tablet 1 Not Taking   • oxyCODONE-acetaminophen (PERCOCET) 5-325 MG per tablet Take 1 tablet by mouth Every 6 (Six) Hours As Needed  "for pain. 30 tablet 0 Not Taking   • oxyCODONE-acetaminophen (PERCOCET) 7.5-325 MG per tablet Take 1 tablet by mouth Every 6 (Six) Hours As Needed for pain. 60 tablet 0 Not Taking   • sulfamethoxazole-trimethoprim (BACTRIM DS) 800-160 MG per tablet Take 1 tablet by mouth 2 (Two) Times a Day. 20 tablet 0      Allergies:  Penicillins      There is no immunization history on file for this patient.    REVIEW OF SYSTEMS:  Please see the above history of present illness for pertinent positives and negatives.  The remainder of the patient's systems have been reviewed and are negative.     Vital Signs  Temp:  [97.6 °F (36.4 °C)] 97.6 °F (36.4 °C)  Heart Rate:  [118-129] 129  Resp:  [24-43] 40  BP: (178-193)/() 193/75    Flowsheet Rows      First Filed Value   Admission Height  175 cm (68.9\") Documented at 02/06/2019 1235   Admission Weight  104 kg (230 lb) Documented at 02/06/2019 1235           Physical Exam:  Physical Exam   Constitutional: Patient appears well-developed and well-nourished and in mild acute distress   HEENT:   Head: Normocephalic and atraumatic.   Eyes:  Pupils are equal, round, and reactive to light. EOM are intact. Sclera are anicteric and non-injected.  Mouth and Throat: Patient has moist mucous membranes. Oropharynx is clear of any erythema or exudate.     Neck: Neck supple. No JVD present. No thyromegaly present. No lymphadenopathy present.  Cardiovascular: tachycardic rate, regular rhythm, S1 normal and S2 normal.  Exam reveals no gallop and no friction rub.  No murmur heard.  Pulmonary/Chest: Lungs coarse bilateral breath sounds throughout, rhonchi, decreased LLL, tachypneic  Abdominal: obese, Soft. Bowel sounds are normal. No distension and no mass. There is no hepatosplenomegaly. There is no tenderness.   Musculoskeletal: Normal Muscle tone  Extremities: No edema. Pulses are palpable in all 4 extremities.  Neurological: Patient is alert and oriented to person, place, and time. Cranial " nerves II-XII are grossly intact with no focal deficits.  Skin: Skin is warm. No rash noted. Nails show no clubbing.  No cyanosis or erythema.    Emotional Behavior:    Judgement and Insight: fair   Mental Status:  Alertness  alert   Memory:  fair   Mood and Affect:         Depression  mild               Anxiety  moderate    Debilities:   Physical Weakness  None observed   Handicaps  None observed   Disabilities  None    Agitation  none     Results Review:    I reviewed the patient's new clinical results.  Lab Results (most recent)     Procedure Component Value Units Date/Time    Ketone Bodies, Serum (Not performed at [place]) [[id number]] Collected:  02/06/19 1259    Specimen:  Blood Updated:  02/06/19 1502    Narrative:       The following orders were created for panel order Ketone Bodies, Serum (Not performed at [place]).  Procedure                               Abnormality         Status                     ---------                               -----------         ------                     Acetone[[id number]]                      Abnormal            Final result                 Please view results for these tests on the individual orders.    Acetone [[id number]]  (Abnormal) Collected:  02/06/19 1259    Specimen:  Blood Updated:  02/06/19 1502     Acetone Small    Blood Culture - Blood, Hand, Right [[id number]] Collected:  02/06/19 1428    Specimen:  Blood from Hand, Right Updated:  02/06/19 1433    Respiratory Culture - Sputum, Cough [[id number]] Collected:  02/06/19 1414    Specimen:  Sputum from Cough Updated:  02/06/19 1433    BNP [[id number]]  (Abnormal) Collected:  02/06/19 1259    Specimen:  Blood Updated:  02/06/19 1417     proBNP 358.7 pg/mL     Narrative:       Among patients with dyspnea, NT-proBNP is highly sensitive for the detection of acute congestive heart failure. In addition NT-proBNP of <300 pg/ml effectively rules out acute congestive heart failure with 99% negative predictive value.    [place]  Draw [[id number]] Collected:  02/06/19 1259    Specimen:  Blood Updated:  02/06/19 1415    Narrative:       The following orders were created for panel order [place] Draw.  Procedure                               Abnormality         Status                     ---------                               -----------         ------                     Light Blue Top[[id number]]                                   Final result               Green Top (Gel)[[id number]]                                  Final result               Lavender Top[[id number]]                                     Final result               Gold Top - SST[[id number]]                                   Final result                 Please view results for these tests on the individual orders.    Gold Top - SST [[id number]] Collected:  02/06/19 1301    Specimen:  Blood Updated:  02/06/19 1415     Extra Tube Hold for add-ons.     Comment: Auto resulted.       Blood Gas, Arterial [[id number]] Collected:  02/06/19 1359    Specimen:  Arterial Blood Updated:  02/06/19 1404    Light Blue Top [[id number]] Collected:  02/06/19 1300    Specimen:  Blood Updated:  02/06/19 1400     Extra Tube hold for add-on     Comment: Auto resulted       Green Top (Gel) [[id number]] Collected:  02/06/19 1300    Specimen:  Blood Updated:  02/06/19 1400     Extra Tube Hold for add-ons.     Comment: Auto resulted.       Lavender Top [[id number]] Collected:  02/06/19 1259    Specimen:  Blood Updated:  02/06/19 1400     Extra Tube hold for add-on     Comment: Auto resulted       CBC & Differential [[id number]] Collected:  02/06/19 1259    Specimen:  Blood Updated:  02/06/19 1351    Narrative:       The following orders were created for panel order CBC & Differential.  Procedure                               Abnormality         Status                     ---------                               -----------         ------                     CBC Auto Differential[[id number]]        Abnormal             Final result                 Please view results for these tests on the individual orders.    CBC Auto Differential [[id number]]  (Abnormal) Collected:  02/06/19 1259    Specimen:  Blood Updated:  02/06/19 1351     WBC 24.26 10*3/mm3      RBC 5.50 10*6/mm3      Hemoglobin 15.3 g/dL      Hematocrit 46.0 %      MCV 83.6 fL      MCH 27.8 pg      MCHC 33.3 g/dL      RDW 12.5 %      RDW-SD 38.3 fl      MPV 9.1 fL      Platelets 595 10*3/mm3      Neutrophil % 81.1 %      Lymphocyte % 7.9 %      Monocyte % 7.8 %      Eosinophil % 0.0 %      Basophil % 0.4 %      Immature Grans % 2.8 %      Neutrophils, Absolute 19.68 10*3/mm3      Lymphocytes, Absolute 1.91 10*3/mm3      Monocytes, Absolute 1.90 10*3/mm3      Eosinophils, Absolute 0.00 10*3/mm3      Basophils, Absolute 0.09 10*3/mm3      Immature Grans, Absolute 0.68 10*3/mm3      nRBC 0.0 /100 WBC     Lactic Acid, Plasma [[id number]]  (Abnormal) Collected:  02/06/19 1313    Specimen:  Blood Updated:  02/06/19 1339     Lactate 2.1 mmol/L     Lactic Acid, Reflex Timer (This will reflex a repeat order 3-3:15 hours after ordered.) [[id number]] Collected:  02/06/19 1313    Specimen:  Blood Updated:  02/06/19 1339    Troponin [[id number]]  (Normal) Collected:  02/06/19 1300    Specimen:  Blood Updated:  02/06/19 1325     Troponin T <0.010 ng/mL     Narrative:       Troponin T Reference Range:  <= 0.03 ng/mL-   Negative for AMI  >0.03 ng/mL-     Abnormal for myocardial necrosis.  Clinicians would have to utilize clinical acumen, EKG, Troponin and serial changes to determine if it is an Acute Myocardial Infarction or myocardial injury due to an underlying chronic condition.     Comprehensive Metabolic Panel [[id number]]  (Abnormal) Collected:  02/06/19 1300    Specimen:  Blood Updated:  02/06/19 1323     Glucose 306 mg/dL      BUN 7 mg/dL      Creatinine 0.92 mg/dL      Sodium 129 mmol/L      Potassium 4.4 mmol/L      Chloride 94 mmol/L      CO2 6.2 mmol/L      Calcium 9.5 mg/dL       Total Protein 9.3 g/dL      Albumin 3.70 g/dL      ALT (SGPT) 6 U/L      AST (SGOT) 8 U/L      Alkaline Phosphatase 215 U/L      Total Bilirubin 0.4 mg/dL      eGFR Non African Amer 86 mL/min/1.73      Globulin 5.6 gm/dL      A/G Ratio 0.7 g/dL      BUN/Creatinine Ratio 7.6     Anion Gap 28.8 mmol/L     Blood Culture - Blood, Arm, Left [[id number]] Collected:  02/06/19 1259    Specimen:  Blood from Arm, Left Updated:  02/06/19 1307          Imaging Results (most recent)     Procedure Component Value Units Date/Time    XR Chest 1 View [[id number]] Collected:  02/06/19 1404     Updated:  02/06/19 1408    Narrative:       FRONTAL CHEST 2/6/2019         HISTORY:  Worsening shortness of air that began last week. Sweating profusely.     TECHNIQUE:  Frontal chest was performed. Comparisons 7/7/2018     FINDINGS:  Cardiac silhouette is within normal limits. Vascularity is unremarkable.  Improved inspiratory result. The right lung appears clear. There has  been interval near complete resolution of a left-sided effusion. There  is blunting of the left CP angle either reflecting a residual trace to  small effusion or pleural reaction. There are patchy interstitial and  alveolar opacities in the left base, suspicious for pneumonia. No  pneumothorax.       Impression:       1. Patchy interstitial and alveolar opacities in the left base  suspicious for pneumonia. Follow-up to clearing recommended.  2. Improved inspiratory result with near complete resolution of the  left-sided effusion since the prior study. Residual trace to small left  effusion or chronic pleural reaction blunting the left CP angle.     This report was finalized on 2/6/2019 2:06 PM by Dr. Rashad Silva MD.           reviewed    ECG/EMG Results (most recent)     Procedure Component Value Units Date/Time    ECG 12 Lead [[id number]] Collected:  02/06/19 1239     Updated:  02/06/19 1359    Narrative:       HEART RATE= 114  bpm  RR Interval= 524  ms  HI Interval= 149   ms  P Horizontal Axis= 45  deg  P Front Axis= 66  deg  QRSD Interval= 111  ms  QT Interval= 338  ms  QRS Axis= -25  deg  T Wave Axis= 26  deg  - OTHERWISE NORMAL ECG -  Sinus tachycardia  Borderline left axis deviation  Electronically Signed By:   Date and Time of Study: 2019-02-06 12:39:27    ECG 12 Lead [[id number]] Resulted:  02/06/19 1459     Updated:  02/06/19 1459        reviewed    Assessment/Plan   Sepsis secondary to LLL pneumonia: Consult pulmonary  H/O VATS LLL/empyema 7/2018:  Received azithromycin, Rocephin, to Levaquin  Add duo nebs, Mucinex, acappella  BiPAP on standby, abg pending now  Check pro-calcitonin, Legionella, strep pneumo, sputum culture, MRSA swab, respiratory viral panel  Check echo  NPO  Monitor in ICU    DKA in obese: check A1C  Body mass index is 34.07 kg/m².  Reportedly noncompliant with checking sugars, no medications noted in med rec  DKA protocol initiated  Hyponatremia: Corrects for glucose  CO2 6.2, recheck per protocol    AHRF secondary to above:   Hyperventilating, recheck ABG stat, no result in chart for previous ABG this afternoon    Leukocytosis/thrombocytosis:  Left shift  Likely dehydration secondary to all above, continue fluid resuscitation/abx, recheck in am    Discussed the patients findings and my recommendations with patient and fiance at bedside.     Tammy To, RUBENS  02/06/19  3:59 PM            Electronically signed by Gage Agee MD at 2/6/2019  6:57 PM       ICU Vital Signs     Row Name 02/07/19 1205 02/07/19 1154 02/07/19 1152 02/07/19 1100 02/07/19 0800       Height and Weight    Weight  --  --  97.1 kg (214 lb 1.1 oz)  --  --       Vitals    Temp  --  --  --  98.2 °F (36.8 °C)  --    Temp src  --  --  --  Axillary  --    Heart Rate Source  Monitor  Monitor  --  Monitor  --    Resp  18  18  --  --  --    Resp Rate Source  Visual  Visual  --  --  --    BP  --  --  --  141/91  --    BP Location  --  --  --  Right arm  --    BP Method  --  --  --   "Automatic  --    Patient Position  --  --  --  Lying  --       Oxygen Therapy    Pulse Oximetry Type  Continuous  Continuous  --  --  --    Device (Oxygen Therapy)  room air  room air  --  room air  room air    Row Name 02/07/19 0743 02/07/19 0402 02/07/19 0100 02/07/19 0014 02/06/19 2300       Height and Weight    Height  172.7 cm (68\")  --  --  --  --    Weight  97.1 kg (214 lb)  97.1 kg (214 lb 1.1 oz)  --  --  --    Weight Method  --  Bed scale  --  --  --    Ideal Body Weight (IBW) (kg)  70.89  --  --  --  --    BSA (Calculated - sq m)  2.1 sq meters  --  --  --  --    BMI (Calculated)  32.5  --  --  --  --    Weight in (lb) to have BMI = 25  164.1  --  --  --  --       Vitals    Temp  --  99.6 °F (37.6 °C)  --  100.1 °F (37.8 °C)  --    Temp src  --  Oral  --  Oral  --    Pulse  --  86  104  102  97    Heart Rate Source  --  Monitor  --  Monitor  --    Resp  --  32  (Abnormal)   --  36  (Abnormal)   --    Resp Rate Source  --  --  --  Visual  --    BP  --  126/79  125/81  126/75  --    Noninvasive MAP (mmHg)  --  95  94  91  --    BP Location  --  Right arm  --  Right arm  --    BP Method  --  Automatic  --  Automatic  --    Patient Position  --  Lying  --  Lying  --       Oxygen Therapy    SpO2  --  97 %  97 %  96 %  96 %    Pulse Oximetry Type  --  Continuous  --  Continuous  --    Device (Oxygen Therapy)  --  room air  --  room air  --    Row Name 02/06/19 2200 02/06/19 2100 02/06/19 2024 02/06/19 2020 02/06/19 2016       Vitals    Temp  --  --  --  --  98.8 °F (37.1 °C)    Temp src  --  --  --  --  Oral    Pulse  102  99  113  --  112    Heart Rate Source  --  --  --  --  Monitor    Resp  --  --  36  (Abnormal)   --  32  (Abnormal)     Resp Rate Source  --  --  --  --  Visual    BP  --  --  --  --  127/85    Noninvasive MAP (mmHg)  --  --  --  --  98    BP Location  --  --  --  --  Right arm    BP Method  --  --  --  --  Automatic    Patient Position  --  --  --  --  Lying       Oxygen Therapy    SpO2  96 " %  97 %  --  --  98 %    Pulse Oximetry Type  --  --  --  --  Continuous    Device (Oxygen Therapy)  --  --  --  room air  room air    Row Name 02/06/19 2010 02/06/19 1649 02/06/19 14:28:28 02/06/19 1407 02/06/19 1401       Vitals    Temp  --  98.1 °F (36.7 °C)  --  --  --    Temp src  --  Oral  --  --  --    Pulse  115  133  (Abnormal)   129  (Abnormal)   126  (Abnormal)   128  (Abnormal)     Heart Rate Source  --  Monitor  --  --  Monitor    Resp  32  (Abnormal)   44  (Abnormal)   40  (Abnormal)   40  (Abnormal)   43  (Abnormal)     Resp Rate Source  Visual  Visual  --  Monitor  Monitor    BP  --  133/63  193/75  (Abnormal)   --  --    Noninvasive MAP (mmHg)  --  83  --  --  --    BP Location  --  Right arm  --  --  --    BP Method  --  Automatic  --  --  --    Patient Position  --  Lying  --  --  --       Oxygen Therapy    SpO2  98 %  98 %  97 %  97 %  98 %    Pulse Oximetry Type  Continuous  Continuous  --  Continuous  Continuous    Device (Oxygen Therapy)  room air  room air  nasal cannula  room air  room air    Flow (L/min)  --  --  2  --  --    Row Name 02/06/19 1347                   Vitals    Pulse  120        Heart Rate Source  Monitor        Resp  24        Resp Rate Source  Visual           Oxygen Therapy    SpO2  97 %        Pulse Oximetry Type  Continuous        Device (Oxygen Therapy)  room air            Lines, Drains & Airways    Active LDAs     Name:   Placement date:   Placement time:   Site:   Days:    Peripheral IV 02/06/19 1738 Left Antecubital   02/06/19    1738    Antecubital   less than 1    Peripheral IV 02/06/19 1930 Left Wrist   02/06/19    1930    Wrist   less than 1         Inactive LDAs     Name:   Placement date:   Placement time:   Removal date:   Removal time:   Site:   Days:    [REMOVED] Peripheral IV 02/06/19 1330 Right Antecubital   02/06/19    1330    02/06/19    1900    Antecubital   less than 1    [REMOVED] Peripheral IV 02/06/19 02/06/19    --    02/06/19    0000    --    "less than 1                Hospital Medications (all)       Dose Frequency Start End    acetaminophen (TYLENOL) suppository 650 mg 650 mg Every 4 Hours PRN 2/6/2019     Sig - Route: Insert 1 suppository into the rectum Every 4 (Four) Hours As Needed for Fever (temperature greater than 101.5 F or headache). - Rectal    Linked Group 1:  \"Or\" Linked Group Details        acetaminophen (TYLENOL) tablet 650 mg 650 mg Every 4 Hours PRN 2/6/2019     Sig - Route: Take 2 tablets by mouth Every 4 (Four) Hours As Needed for Headache or Fever (fever greater than 101.5 F). - Oral    Linked Group 1:  \"Or\" Linked Group Details        albuterol (PROVENTIL) nebulizer solution 0.083% 2.5 mg/3mL 2.5 mg Every 15 Minutes 2/6/2019 2/6/2019    Sig - Route: Take 2.5 mg by nebulization Every 15 (Fifteen) Minutes. - Nebulization    cefTRIAXone (ROCEPHIN) IVPB 2 g/50ml dextrose (premix) 2 g Once 2/6/2019 2/6/2019    Sig - Route: Infuse 50 mL into a venous catheter 1 (One) Time. - Intravenous    dextrose (D50W) 25 g/ 50mL Intravenous Solution 12.5 g 12.5 g Every 15 Minutes PRN 2/6/2019     Sig - Route: Infuse 25 mL into a venous catheter Every 15 (Fifteen) Minutes As Needed for Low Blood Sugar (for blood glucose < 100 mg/dL). - Intravenous    dextrose 5 % and sodium chloride 0.45 % infusion 150 mL/hr Continuous PRN 2/6/2019     Sig - Route: Infuse 150 mL/hr into a venous catheter Continuous As Needed (Once Glucose Less Than or Equal to 200 mg/dL and Serum Potassium Greater Than 5). - Intravenous    dextrose 5 % and sodium chloride 0.45 % with KCl 20 mEq/L infusion 150 mL/hr Continuous PRN 2/6/2019     Sig - Route: Infuse 150 mL/hr into a venous catheter Continuous As Needed (Once Glucose Less Than or Equal to 200 mg/dL and Serum Potassium Less Than or Equal to 5). - Intravenous    enoxaparin (LOVENOX) syringe 40 mg 40 mg Every 24 Hours 2/6/2019     Sig - Route: Inject 0.4 mL under the skin into the appropriate area as directed Daily. - " Subcutaneous    famotidine (PEPCID) injection 20 mg 20 mg Every 12 Hours Scheduled 2/6/2019     Sig - Route: Infuse 2 mL into a venous catheter Every 12 (Twelve) Hours. - Intravenous    guaiFENesin (MUCINEX) 12 hr tablet 600 mg 600 mg 2 Times Daily 2/6/2019     Sig - Route: Take 1 tablet by mouth 2 (Two) Times a Day. - Oral    hydrALAZINE (APRESOLINE) injection 20 mg 20 mg Every 4 Hours PRN 2/6/2019     Sig - Route: Infuse 1 mL into a venous catheter Every 4 (Four) Hours As Needed for High Blood Pressure. - Intravenous    insulin aspart (novoLOG) 100 UNIT/ML injection  - ADS Override Pull   2/6/2019 2/7/2019    Notes to Pharmacy: Created by cabinet override    insulin aspart (novoLOG) injection 5 Units 5 Units Once 2/6/2019 2/6/2019    Sig - Route: Inject 5 Units under the skin into the appropriate area as directed 1 (One) Time. - Subcutaneous    insulin bolus from bag 10 Units 10 Units Once 2/6/2019 2/6/2019    Sig - Route: Infuse 10 mL into a venous catheter 1 (One) Time. - Intravenous    insulin bolus from bag 10 Units 10 Units Once As Needed 2/6/2019     Sig - Route: Infuse 10 mL into a venous catheter 1 (One) Time As Needed (If glucose does not decrease by 10% in the first hour after start of infusion.). - Intravenous    insulin bolus from bag 5 Units 5 Units Once As Needed 2/6/2019     Sig - Route: Infuse 5 mL into a venous catheter 1 (One) Time As Needed (If BG drops to </= 200mg/dL THEN rises to >/= 300 mg/dL.). - Intravenous    insulin regular (HumuLIN R,NovoLIN R) 100 Units in sodium chloride 0.9 % 100 mL (1 Units/mL) infusion 0.1 Units/kg/hr × 104 kg Titrated 2/6/2019     Sig - Route: Infuse 10 Units/hr into a venous catheter Dose Adjusted By Provider As Needed. - Intravenous    insulin regular (humuLIN R,novoLIN R) injection 10 Units 10 Units Once 2/6/2019     Sig - Route: Infuse 10 Units into a venous catheter 1 (One) Time. - Intravenous    insulin regular (humuLIN R,novoLIN R) injection 12 Units 12  "Units Once 2/6/2019 2/6/2019    Sig - Route: Inject 12 Units under the skin into the appropriate area as directed 1 (One) Time. - Subcutaneous    ipratropium-albuterol (DUO-NEB) nebulizer solution 3 mL 3 mL 4 Times Daily - RT 2/6/2019     Sig - Route: Take 3 mL by nebulization 4 (Four) Times a Day. - Nebulization    ipratropium-albuterol (DUO-NEB) nebulizer solution 3 mL 3 mL Every 6 Hours PRN 2/6/2019     Sig - Route: Take 3 mL by nebulization Every 6 (Six) Hours As Needed for Wheezing or Shortness of Air. - Nebulization    ketorolac (TORADOL) injection 15 mg 15 mg Every 6 Hours PRN 2/7/2019 2/12/2019    Sig - Route: Infuse 0.5 mL into a venous catheter Every 6 (Six) Hours As Needed for Moderate Pain  or Severe Pain . - Intravenous    lactated ringers infusion 1,000 mL 1,000 mL Once 2/7/2019 2/7/2019    Sig - Route: Infuse 1,000 mL into a venous catheter 1 (One) Time. - Intravenous    levoFLOXacin (LEVAQUIN) 750 mg/150 mL D5W (premix) (LEVAQUIN) 750 mg 750 mg Every 24 Hours 2/6/2019 2/13/2019    Sig - Route: Infuse 150 mL into a venous catheter Daily. - Intravenous    LORazepam (ATIVAN) injection 1 mg 1 mg Once 2/6/2019 2/6/2019    Sig - Route: Infuse 0.5 mL into a venous catheter 1 (One) Time. - Intravenous    Cosign for Ordering: Accepted by Alfonso Phan MD on 2/6/2019  2:59 PM    LORazepam (ATIVAN) injection 1 mg 1 mg Once 2/6/2019 2/6/2019    Sig - Route: Infuse 0.5 mL into a venous catheter 1 (One) Time. - Intravenous    LORazepam (ATIVAN) injection 1 mg 1 mg Every 4 Hours PRN 2/6/2019 2/16/2019    Sig - Route: Infuse 0.5 mL into a venous catheter Every 4 (Four) Hours As Needed for Anxiety or Agitation. - Intravenous    Magnesium Sulfate 2 gram / 50mL Infusion (GIVE X 3 BAGS TO EQUAL 6GM TOTAL DOSE) - Mg 1.1 - 1.5 mg/dl 2 g As Needed 2/7/2019     Sig - Route: Infuse 50 mL into a venous catheter As Needed (See Administration Instructions). - Intravenous    Linked Group 2:  \"Or\" Linked Group Details        " "Magnesium Sulfate 2 gram Bolus, followed by 8 gram infusion (total Mg dose 10 grams)- Mg less than or equal to 1mg/dL 2 g As Needed 2/7/2019     Sig - Route: Infuse 50 mL into a venous catheter As Needed (See Administration Instructions). - Intravenous    Linked Group 2:  \"Or\" Linked Group Details        Magnesium Sulfate 4 gram infusion- Mg 1.6-1.9 mg/dL 4 g As Needed 2/7/2019     Sig - Route: Infuse 100 mL into a venous catheter As Needed (See Administration Instructions). - Intravenous    Linked Group 2:  \"Or\" Linked Group Details        ondansetron (ZOFRAN) injection 4 mg 4 mg Every 6 Hours PRN 2/6/2019     Sig - Route: Infuse 2 mL into a venous catheter Every 6 (Six) Hours As Needed for Nausea or Vomiting. - Intravenous    Linked Group 3:  \"Or\" Linked Group Details        ondansetron (ZOFRAN) tablet 4 mg 4 mg Every 6 Hours PRN 2/6/2019     Sig - Route: Take 1 tablet by mouth Every 6 (Six) Hours As Needed for Nausea or Vomiting. - Oral    Linked Group 3:  \"Or\" Linked Group Details        ondansetron ODT (ZOFRAN-ODT) disintegrating tablet 4 mg 4 mg Every 6 Hours PRN 2/6/2019     Sig - Route: Take 1 tablet by mouth Every 6 (Six) Hours As Needed for Nausea or Vomiting. - Oral    Linked Group 3:  \"Or\" Linked Group Details        perflutren (DEFINITY) 8.476 mg in sodium chloride 0.9 % 10 mL injection 2 mL Once 2/7/2019 2/7/2019    Sig - Route: Infuse 2 mL into a venous catheter 1 (One) Time. - Intravenous    Pharmacy to Dose enoxaparin (LOVENOX)  Continuous PRN 2/6/2019     Sig - Route: Continuous As Needed for Consult. - Does not apply    potassium chloride (KLOR-CON) packet 10 mEq 10 mEq As Needed 2/6/2019     Sig - Route: Take 10 mEq by mouth As Needed (Potassium replacement per admin instructions). - Oral    Linked Group 4:  \"Or\" Linked Group Details        potassium chloride (KLOR-CON) packet 20 mEq 20 mEq As Needed 2/6/2019     Sig - Route: Take 20 mEq by mouth As Needed (Potassium replacement per admin " "instructions). - Oral    Linked Group 5:  \"Or\" Linked Group Details        potassium chloride (KLOR-CON) packet 40 mEq 40 mEq As Needed 2/6/2019     Sig - Route: Take 40 mEq by mouth As Needed (Potassium replacement per admin instructions). - Oral    Linked Group 6:  \"Or\" Linked Group Details        potassium chloride (MICRO-K) CR capsule 10 mEq 10 mEq As Needed 2/6/2019     Sig - Route: Take 1 capsule by mouth As Needed (Potassium replacement per admin instructions). - Oral    Linked Group 4:  \"Or\" Linked Group Details        potassium chloride (MICRO-K) CR capsule 20 mEq 20 mEq As Needed 2/6/2019     Sig - Route: Take 2 capsules by mouth As Needed (Potassium replacement per admin instructions). - Oral    Linked Group 5:  \"Or\" Linked Group Details        potassium chloride (MICRO-K) CR capsule 40 mEq 40 mEq As Needed 2/6/2019     Sig - Route: Take 4 capsules by mouth As Needed (Potassium replacement per admin instructions). - Oral    Linked Group 6:  \"Or\" Linked Group Details        potassium chloride 10 mEq in 100 mL IVPB 10 mEq As Needed 2/6/2019     Sig - Route: Infuse 100 mL into a venous catheter As Needed (Potassium replacement per admin instructions). - Intravenous    Linked Group 6:  \"Or\" Linked Group Details        potassium chloride 10 mEq in 100 mL IVPB 10 mEq As Needed 2/6/2019     Sig - Route: Infuse 100 mL into a venous catheter As Needed (Potassium replacement per admin instructions). - Intravenous    Linked Group 5:  \"Or\" Linked Group Details        potassium chloride 10 mEq in 100 mL IVPB 10 mEq As Needed 2/6/2019     Sig - Route: Infuse 100 mL into a venous catheter As Needed (Potassium replacement per admin instructions). - Intravenous    Linked Group 4:  \"Or\" Linked Group Details        potassium phosphate 15 mmol in sodium chloride 0.9 % 100 mL infusion 15 mmol As Needed 2/7/2019     Sig - Route: Infuse 15 mmol into a venous catheter As Needed (Phosphorus replacement - see admin instructions). " "- Intravenous    Linked Group 7:  \"Or\" Linked Group Details        potassium phosphate 30 mmol in sodium chloride 0.9 % 250 mL infusion 30 mmol As Needed 2/7/2019     Sig - Route: Infuse 30 mmol into a venous catheter As Needed (Phosphorus replacement - see admin instructions). - Intravenous    Linked Group 7:  \"Or\" Linked Group Details        potassium phosphate 45 mmol in sodium chloride 0.9 % 500 mL infusion 45 mmol As Needed 2/7/2019     Sig - Route: Infuse 45 mmol into a venous catheter As Needed (Phosphorus replacement - see admin instruction). - Intravenous    Linked Group 7:  \"Or\" Linked Group Details        sodium chloride 0.45 % infusion 250 mL/hr Continuous 2/6/2019     Sig - Route: Infuse 250 mL/hr into a venous catheter Continuous. - Intravenous    sodium chloride 0.45 % with KCl 20 mEq/L infusion 250 mL/hr Continuous PRN 2/6/2019     Sig - Route: Infuse 250 mL/hr into a venous catheter Continuous As Needed (After Initial 2 Hours - If Potassium Level is Less Than or Equal to 5 (If Greater Than 5 use 0.45%)). - Intravenous    sodium chloride 0.9 % bolus 1,000 mL 1,000 mL Once 2/6/2019 2/6/2019    Sig - Route: Infuse 1,000 mL into a venous catheter 1 (One) Time. - Intravenous    sodium chloride 0.9 % flush 10 mL 10 mL As Needed 2/6/2019     Sig - Route: Infuse 10 mL into a venous catheter As Needed for Line Care. - Intravenous    Cosign for Ordering: Accepted by Alfonso Phan MD on 2/6/2019  2:59 PM    sodium chloride 0.9 % flush 3 mL 3 mL Every 12 Hours Scheduled 2/6/2019     Sig - Route: Infuse 3 mL into a venous catheter Every 12 (Twelve) Hours. - Intravenous    sodium chloride 0.9 % flush 3-10 mL 3-10 mL As Needed 2/6/2019     Sig - Route: Infuse 3-10 mL into a venous catheter As Needed for Line Care. - Intravenous    sodium chloride 0.9 % infusion 40 mL 40 mL As Needed 2/6/2019     Sig - Route: Infuse 40 mL into a venous catheter As Needed for Line Care. - Intravenous    sodium chloride 0.9% - IBW " "for BMI > 30 bolus 2,115 mL 30 mL/kg × 70.5 kg (Ideal) Once 2/6/2019 2/6/2019    Sig - Route: Infuse 2,115 mL into a venous catheter 1 (One) Time. - Intravenous    sodium phosphates 15 mmol in sodium chloride 0.9 % 250 mL IVPB 15 mmol As Needed 2/7/2019     Sig - Route: Infuse 15 mmol into a venous catheter As Needed (Peripheral IV - Phosphorus 1.8 - 2.5 mg/dL & Potassium Greater Than 4). - Intravenous    Linked Group 7:  \"Or\" Linked Group Details        sodium phosphates 30 mmol in sodium chloride 0.9 % 250 mL IVPB 30 mmol As Needed 2/7/2019     Sig - Route: Infuse 30 mmol into a venous catheter As Needed (Peripheral IV - Phosphorus 1.3-1.7 mg/dL & Potassium Greater Than 4). - Intravenous    Linked Group 7:  \"Or\" Linked Group Details        sodium phosphates 45 mmol in sodium chloride 0.9 % 500 mL IVPB 45 mmol As Needed 2/7/2019     Sig - Route: Infuse 45 mmol into a venous catheter As Needed (Peripheral IV - Phosphorus Less Than 1.3 mg/dL & Potassium Greater Than 4). - Intravenous    Linked Group 7:  \"Or\" Linked Group Details        traMADol (ULTRAM) tablet 50 mg 50 mg Every 6 Hours PRN 2/7/2019 2/17/2019    Sig - Route: Take 1 tablet by mouth Every 6 (Six) Hours As Needed for Moderate Pain . - Oral    azithromycin 500 MG/250 ML 0.9% NS IVPB (MBP) (Discontinued) 500 mg Once 2/6/2019 2/6/2019    Sig - Route: Infuse 250 mL into a venous catheter 1 (One) Time. - Intravenous    ketorolac (TORADOL) injection 15 mg (Discontinued) 15 mg Every 6 Hours PRN 2/7/2019 2/7/2019    Sig - Route: Inject 0.5 mL into the appropriate muscle as directed by prescriber Every 6 (Six) Hours As Needed for Moderate Pain  or Severe Pain . - Intramuscular            Lab Results (last 24 hours)     Procedure Component Value Units Date/Time    Procalcitonin [[id number]] Collected:  02/07/19 1214    Specimen:  Blood Updated:  02/07/19 1318    Blood Culture - Blood, Arm, Left [[id number]] Collected:  02/06/19 1259    Specimen:  Blood from Arm, " Left Updated:  02/07/19 1315     Blood Culture No growth at 24 hours    Magnesium [[id number]]  (Normal) Collected:  02/07/19 1214    Specimen:  Blood Updated:  02/07/19 1238     Magnesium 2.2 mg/dL     Basic Metabolic Panel [[id number]]  (Abnormal) Collected:  02/07/19 1214    Specimen:  Blood Updated:  02/07/19 1233     Glucose 189 mg/dL      BUN 4 mg/dL      Creatinine 0.43 mg/dL      Sodium 131 mmol/L      Potassium 3.4 mmol/L      Chloride 105 mmol/L      CO2 15.2 mmol/L      Calcium 8.4 mg/dL      eGFR Non African Amer >150 mL/min/1.73      BUN/Creatinine Ratio 9.3     Anion Gap 10.8 mmol/L     Narrative:       GFR Normal >60  Chronic Kidney Disease <60  Kidney Failure <15    Phosphorus [[id number]]  (Abnormal) Collected:  02/07/19 1214    Specimen:  Blood Updated:  02/07/19 1233     Phosphorus 1.0 mg/dL     POC Glucose Once [[id number]]  (Abnormal) Collected:  02/07/19 1201    Specimen:  Blood Updated:  02/07/19 1210     Glucose 204 mg/dL     POC Glucose Once [[id number]]  (Abnormal) Collected:  02/07/19 0949    Specimen:  Blood Updated:  02/07/19 0955     Glucose 166 mg/dL     Respiratory Culture - Sputum, Cough [[id number]] Collected:  02/06/19 1414    Specimen:  Sputum from Cough Updated:  02/07/19 0954     Gram Stain Rare (1+) Epithelial cells per low power field      Moderate (3+) WBCs seen      Few (2+) Mixed bacterial morphotypes seen on Gram Stain    MRSA Screen Culture - Swab, Nares [[id number]]  (Normal) Collected:  02/06/19 1650    Specimen:  Swab from Nares Updated:  02/07/19 0940     MRSA SCREEN CX No Methicillin Resistant Staphylococcus aureus isolated    Phosphorus [[id number]]  (Abnormal) Collected:  02/07/19 0828    Specimen:  Blood Updated:  02/07/19 0855     Phosphorus 1.1 mg/dL     Basic Metabolic Panel [[id number]]  (Abnormal) Collected:  02/07/19 0828    Specimen:  Blood Updated:  02/07/19 0855     Glucose 149 mg/dL      BUN 5 mg/dL      Creatinine 0.51 mg/dL      Sodium 133 mmol/L      Potassium  3.6 mmol/L      Chloride 107 mmol/L      CO2 13.9 mmol/L      Calcium 8.9 mg/dL      eGFR Non African Amer >150 mL/min/1.73      BUN/Creatinine Ratio 9.8     Anion Gap 12.1 mmol/L     Narrative:       GFR Normal >60  Chronic Kidney Disease <60  Kidney Failure <15    Magnesium [[id number]]  (Abnormal) Collected:  02/07/19 0828    Specimen:  Blood Updated:  02/07/19 0855     Magnesium 1.6 mg/dL     POC Glucose Once [[id number]]  (Abnormal) Collected:  02/07/19 0813    Specimen:  Blood Updated:  02/07/19 0827     Glucose 160 mg/dL     POC Glucose Once [[id number]]  (Abnormal) Collected:  02/07/19 0608    Specimen:  Blood Updated:  02/07/19 0624     Glucose 165 mg/dL     Phosphorus [[id number]]  (Abnormal) Collected:  02/07/19 0356    Specimen:  Blood Updated:  02/07/19 0453     Phosphorus 1.0 mg/dL     Basic Metabolic Panel [[id number]]  (Abnormal) Collected:  02/07/19 0356    Specimen:  Blood Updated:  02/07/19 0453     Glucose 160 mg/dL      BUN 6 mg/dL      Creatinine 0.52 mg/dL      Sodium 132 mmol/L      Potassium 3.6 mmol/L      Chloride 105 mmol/L      CO2 13.3 mmol/L      Calcium 8.6 mg/dL      eGFR Non African Amer >150 mL/min/1.73      BUN/Creatinine Ratio 11.5     Anion Gap 13.7 mmol/L     Narrative:       GFR Normal >60  Chronic Kidney Disease <60  Kidney Failure <15    Magnesium [[id number]]  (Normal) Collected:  02/07/19 0356    Specimen:  Blood Updated:  02/07/19 0453     Magnesium 1.7 mg/dL     CBC & Differential [[id number]] Collected:  02/07/19 0356    Specimen:  Blood Updated:  02/07/19 0439    Narrative:       The following orders were created for panel order CBC & Differential.  Procedure                               Abnormality         Status                     ---------                               -----------         ------                     CBC Auto Differential[[id number]]        Abnormal            Final result                 Please view results for these tests on the individual orders.    CBC  Auto Differential [[id number]]  (Abnormal) Collected:  02/07/19 0356    Specimen:  Blood Updated:  02/07/19 0439     WBC 22.45 10*3/mm3      RBC 4.40 10*6/mm3      Hemoglobin 12.3 g/dL      Hematocrit 34.7 %      MCV 78.9 fL      MCH 28.0 pg      MCHC 35.4 g/dL      RDW 12.5 %      RDW-SD 35.6 fl      MPV 8.7 fL      Platelets 506 10*3/mm3      Neutrophil % 79.3 %      Lymphocyte % 8.9 %      Monocyte % 10.1 %      Eosinophil % 0.1 %      Basophil % 0.2 %      Immature Grans % 1.4 %      Neutrophils, Absolute 17.81 10*3/mm3      Lymphocytes, Absolute 1.99 10*3/mm3      Monocytes, Absolute 2.27 10*3/mm3      Eosinophils, Absolute 0.02 10*3/mm3      Basophils, Absolute 0.05 10*3/mm3      Immature Grans, Absolute 0.31 10*3/mm3      nRBC 0.0 /100 WBC     POC Glucose Once [[id number]]  (Abnormal) Collected:  02/07/19 0355    Specimen:  Blood Updated:  02/07/19 0413     Glucose 166 mg/dL     Blood Culture - Blood, Hand, Right [[id number]] Collected:  02/06/19 1428    Specimen:  Blood from Hand, Right Updated:  02/07/19 0246     Blood Culture No growth at less than 24 hours    POC Glucose Once [[id number]]  (Abnormal) Collected:  02/07/19 0157    Specimen:  Blood Updated:  02/07/19 0230     Glucose 177 mg/dL     POC Glucose Once [[id number]]  (Abnormal) Collected:  02/07/19 0108    Specimen:  Blood Updated:  02/07/19 0121     Glucose 170 mg/dL     Phosphorus [[id number]]  (Abnormal) Collected:  02/07/19 0008    Specimen:  Blood Updated:  02/07/19 0039     Phosphorus 0.8 mg/dL     Basic Metabolic Panel [[id number]]  (Abnormal) Collected:  02/07/19 0008    Specimen:  Blood Updated:  02/07/19 0039     Glucose 168 mg/dL      BUN 7 mg/dL      Creatinine 0.59 mg/dL      Sodium 131 mmol/L      Potassium 3.7 mmol/L      Chloride 104 mmol/L      CO2 14.4 mmol/L      Calcium 8.7 mg/dL      eGFR Non African Amer 143 mL/min/1.73      BUN/Creatinine Ratio 11.9     Anion Gap 12.6 mmol/L     Narrative:       GFR Normal >60  Chronic Kidney  Disease <60  Kidney Failure <15    Magnesium [[id number]]  (Normal) Collected:  02/07/19 0008    Specimen:  Blood Updated:  02/07/19 0039     Magnesium 1.7 mg/dL     POC Glucose Once [[id number]]  (Abnormal) Collected:  02/07/19 0008    Specimen:  Blood Updated:  02/07/19 0020     Glucose 169 mg/dL     POC Glucose Once [[id number]]  (Abnormal) Collected:  02/06/19 2310    Specimen:  Blood Updated:  02/06/19 2338     Glucose 163 mg/dL     POC Glucose Once [[id number]]  (Abnormal) Collected:  02/06/19 2210    Specimen:  Blood Updated:  02/06/19 2217     Glucose 178 mg/dL     Blood Gas, Arterial [[id number]]  (Abnormal) Collected:  02/06/19 2200    Specimen:  Arterial Blood Updated:  02/06/19 2203     Site Right Brachial     Mike's Test N/A     pH, Arterial 7.403 pH units      pCO2, Arterial 20.3 mm Hg      Comment: 84 Value below reference range        pO2, Arterial 71.2 mm Hg      Comment: 84 Value below reference range        HCO3, Arterial 12.7 mmol/L      Comment: 84 Value below reference range        Base Excess, Arterial -9.8 mmol/L      Comment: 84 Value below reference range        O2 Saturation, Arterial 96.9 %      Hemoglobin, Blood Gas 13.1 g/dL      Comment: 84 Value below reference range        Temperature 37.0 C      Barometric Pressure for Blood Gas 737 mmHg      Modality Room Air     Ventilator Mode NA     Comment: Meter: [id number]     :  [id number]        pCO2, Temperature Corrected 20.3 mm Hg      pH, Temp Corrected 7.403 pH Units      pO2, Temperature Corrected 71.2 mm Hg     Respiratory Panel, PCR - Swab, Nasopharynx [[id number]]  (Normal) Collected:  02/06/19 1650    Specimen:  Swab from Nasopharynx Updated:  02/06/19 2157     ADENOVIRUS, PCR Not Detected     Coronavirus 229E Not Detected     Coronavirus HKU1 Not Detected     Coronavirus NL63 Not Detected     Coronavirus OC43 Not Detected     Human Metapneumovirus Not Detected     Human Rhinovirus/Enterovirus Not Detected     Influenza B  PCR Not Detected     Parainfluenza Virus 1 Not Detected     Parainfluenza Virus 2 Not Detected     Parainfluenza Virus 3 Not Detected     Parainfluenza Virus 4 Not Detected     Bordetella pertussis pcr Not Detected     Influenza A H1 2009 PCR Not Detected     Chlamydophila pneumoniae PCR Not Detected     Mycoplasma pneumo by PCR Not Detected     Influenza A PCR Not Detected     Influenza A H3 Not Detected     Influenza A H1 Not Detected     RSV, PCR Not Detected     Bordetella parapertussis PCR Not Detected    POC Glucose Once [[id number]]  (Abnormal) Collected:  02/06/19 2109    Specimen:  Blood Updated:  02/06/19 2115     Glucose 169 mg/dL     Phosphorus [[id number]]  (Abnormal) Collected:  02/06/19 2025    Specimen:  Blood Updated:  02/06/19 2052     Phosphorus 1.1 mg/dL     Basic Metabolic Panel [[id number]]  (Abnormal) Collected:  02/06/19 2025    Specimen:  Blood Updated:  02/06/19 2052     Glucose 181 mg/dL      BUN 7 mg/dL      Creatinine 0.72 mg/dL      Sodium 133 mmol/L      Potassium 3.7 mmol/L      Chloride 103 mmol/L      CO2 11.0 mmol/L      Calcium 9.0 mg/dL      eGFR Non African Amer 114 mL/min/1.73      BUN/Creatinine Ratio 9.7     Anion Gap 19.0 mmol/L     Narrative:       GFR Normal >60  Chronic Kidney Disease <60  Kidney Failure <15    Magnesium [[id number]]  (Normal) Collected:  02/06/19 2025    Specimen:  Blood Updated:  02/06/19 2052     Magnesium 1.7 mg/dL     POC Glucose Once [[id number]]  (Abnormal) Collected:  02/06/19 2010    Specimen:  Blood Updated:  02/06/19 2017     Glucose 201 mg/dL     POC Glucose Once [[id number]]  (Abnormal) Collected:  02/06/19 1907    Specimen:  Blood Updated:  02/06/19 1920     Glucose 316 mg/dL     Urinalysis, Microscopic Only - Urine, Clean Catch [[id number]]  (Abnormal) Collected:  02/06/19 1804    Specimen:  Urine, Clean Catch Updated:  02/06/19 1859     RBC, UA 3-5 /HPF      WBC, UA None Seen /HPF      Bacteria, UA None Seen /HPF      Squamous Epithelial Cells,  UA 0-2 /HPF      Hyaline Casts, UA None Seen /LPF      Methodology Manual Light Microscopy    Urinalysis With Culture If Indicated - Urine, Clean Catch [[id number]]  (Abnormal) Collected:  02/06/19 1804    Specimen:  Urine, Clean Catch Updated:  02/06/19 1843     Color, UA Straw     Appearance, UA Clear     pH, UA <=5.0     Specific Gravity, UA 1.020     Glucose,  mg/dL (2+)     Ketones, UA >=160 mg/dL (4+)     Bilirubin, UA Negative     Blood, UA Small (1+)     Protein,  mg/dL (2+)     Leuk Esterase, UA Negative     Nitrite, UA Negative     Urobilinogen, UA 0.2 E.U./dL    S. Pneumo Ag Urine or CSF - Urine, Urine, Clean Catch [[id number]]  (Normal) Collected:  02/06/19 1804    Specimen:  Urine, Clean Catch Updated:  02/06/19 1843     Strep Pneumo Ag Negative    Legionella Antigen, Urine - Urine, Urine, Clean Catch [[id number]]  (Normal) Collected:  02/06/19 1804    Specimen:  Urine, Clean Catch Updated:  02/06/19 1841     LEGIONELLA ANTIGEN, URINE Negative    Lactic Acid, Reflex [[id number]]  (Abnormal) Collected:  02/06/19 1732    Specimen:  Blood Updated:  02/06/19 1801     Lactate 3.4 mmol/L     Comprehensive Metabolic Panel [[id number]]  (Abnormal) Collected:  02/06/19 1732    Specimen:  Blood Updated:  02/06/19 1800     Glucose 375 mg/dL      BUN 8 mg/dL      Creatinine 0.93 mg/dL      Sodium 131 mmol/L      Potassium 4.2 mmol/L      Chloride 100 mmol/L      CO2 6.1 mmol/L      Calcium 8.8 mg/dL      Total Protein 8.0 g/dL      Albumin 3.40 g/dL      ALT (SGPT) 5 U/L      AST (SGOT) 6 U/L      Alkaline Phosphatase 190 U/L      Total Bilirubin 0.2 mg/dL      eGFR Non African Amer 85 mL/min/1.73      Globulin 4.6 gm/dL      A/G Ratio 0.7 g/dL      BUN/Creatinine Ratio 8.6     Anion Gap 24.9 mmol/L     Phosphorus [[id number]]  (Abnormal) Collected:  02/06/19 1732    Specimen:  Blood Updated:  02/06/19 1800     Phosphorus 2.1 mg/dL     Magnesium [[id number]]  (Normal) Collected:  02/06/19 1738    Specimen:   Blood Updated:  02/06/19 1800     Magnesium 1.9 mg/dL     Blood Gas, Arterial [[id number]]  (Abnormal) Collected:  02/06/19 1750    Specimen:  Arterial Blood Updated:  02/06/19 1757     Site Left Radial     Mike's Test Positive     pH, Arterial 7.245 pH units      Comment: 84 Value below reference range        pCO2, Arterial 12.4 mm Hg      Comment: 85 Value below critical limit        pO2, Arterial 101.0 mm Hg      HCO3, Arterial 5.4 mmol/L      Comment: 84 Value below reference range        Base Excess, Arterial -19.1 mmol/L      Comment: 84 Value below reference range        O2 Saturation, Arterial 98.2 %      Hemoglobin, Blood Gas 14.1 g/dL      Temperature 37.0 C      Barometric Pressure for Blood Gas 737 mmHg      Modality Room Air     Ventilator Mode NA     Comment: Meter: [id number]     :  [id number]        Notified Who rb and v Dr [email or web address]     Notified By [id number]     Notified Time 02/06/2019 17:58     pCO2, Temperature Corrected 12.4 mm Hg      pH, Temp Corrected 7.245 pH Units      pO2, Temperature Corrected 101 mm Hg     POC Glucose Once [[id number]]  (Abnormal) Collected:  02/06/19 1746    Specimen:  Blood Updated:  02/06/19 1755     Glucose 390 mg/dL     CBC & Differential [[id number]] Collected:  02/06/19 1732    Specimen:  Blood Updated:  02/06/19 1741    Narrative:       The following orders were created for panel order CBC & Differential.  Procedure                               Abnormality         Status                     ---------                               -----------         ------                     CBC Auto Differential[[id number]]        Abnormal            Final result                 Please view results for these tests on the individual orders.    CBC Auto Differential [[id number]]  (Abnormal) Collected:  02/06/19 1732    Specimen:  Blood Updated:  02/06/19 1741     WBC 24.55 10*3/mm3      RBC 4.99 10*6/mm3      Hemoglobin 13.8 g/dL      Hematocrit 41.4 %      MCV 83.0 fL      MCH  27.7 pg      MCHC 33.3 g/dL      RDW 12.7 %      RDW-SD 38.5 fl      MPV 8.6 fL      Platelets 515 10*3/mm3      Neutrophil % 85.5 %      Lymphocyte % 3.7 %      Monocyte % 7.1 %      Eosinophil % 0.0 %      Basophil % 0.3 %      Immature Grans % 3.4 %      Neutrophils, Absolute 20.98 10*3/mm3      Lymphocytes, Absolute 0.91 10*3/mm3      Monocytes, Absolute 1.75 10*3/mm3      Eosinophils, Absolute 0.00 10*3/mm3      Basophils, Absolute 0.08 10*3/mm3      Immature Grans, Absolute 0.83 10*3/mm3      nRBC 0.0 /100 WBC     Lactic Acid, Reflex Timer (This will reflex a repeat order 3-3:15 hours after ordered.) [[id number]] Collected:  02/06/19 1313    Specimen:  Blood Updated:  02/06/19 1645     Extra Tube Hold for add-ons.     Comment: Auto resulted.       POC Glucose Once [[id number]]  (Abnormal) Collected:  02/06/19 1627    Specimen:  Blood Updated:  02/06/19 1636     Glucose 458 mg/dL     Procalcitonin [[id number]]  (Abnormal) Collected:  02/06/19 1259    Specimen:  Blood Updated:  02/06/19 1618     Procalcitonin 0.03 ng/mL     Narrative:       As a Marker for Sepsis (Non-Neonates):   1. <0.5 ng/mL represents a low risk of severe sepsis and/or septic shock.  2. >2 ng/mL represents a high risk of severe sepsis and/or septic shock.    As a Marker for Lower Respiratory Tract Infections that require antibiotic therapy:    PCT on Admission     Antibiotic Therapy       6-12 Hrs later  > 0.5                Strongly Recommended             >0.25 - <0.5         Recommended  0.1 - 0.25           Discouraged              Remeasure/reassess PCT  <0.1                 Strongly Discouraged     Remeasure/reassess PCT                     PCT values of < 0.5 ng/mL do not exclude an infection, because localized infections (without systemic signs) may be associated with such low concentrations, or a systemic infection in its initial stages (< 6 hours). Furthermore, increased PCT can occur without infection. PCT concentrations between 0.5  and 2.0 ng/mL should be interpreted taking into account the patient's history. It is recommended to retest PCT within 6-24 hours if any concentrations < 2 ng/mL are obtained.    Hemoglobin A1c [[id number]]  (Abnormal) Collected:  02/06/19 1259    Specimen:  Blood Updated:  02/06/19 1609     Hemoglobin A1C 14.40 %     Narrative:       Hemoglobin A1C Ranges:    Increased Risk for Diabetes  5.7% to 6.4%  Diabetes                     >= 6.5%  Diabetic Goal                < 7.0%    Ketone Bodies, Serum (Not performed at [facility]) [[id number]] Collected:  02/06/19 1259    Specimen:  Blood Updated:  02/06/19 1502    Narrative:       The following orders were created for panel order Ketone Bodies, Serum (Not performed at [facility]).  Procedure                               Abnormality         Status                     ---------                               -----------         ------                     Acetone[[id number]]                      Abnormal            Final result                 Please view results for these tests on the individual orders.    Acetone [[id number]]  (Abnormal) Collected:  02/06/19 1259    Specimen:  Blood Updated:  02/06/19 1502     Acetone Small    BNP [[id number]]  (Abnormal) Collected:  02/06/19 1259    Specimen:  Blood Updated:  02/06/19 1417     proBNP 358.7 pg/mL     Narrative:       Among patients with dyspnea, NT-proBNP is highly sensitive for the detection of acute congestive heart failure. In addition NT-proBNP of <300 pg/ml effectively rules out acute congestive heart failure with 99% negative predictive value.    [facility] Draw [[id number]] Collected:  02/06/19 1259    Specimen:  Blood Updated:  02/06/19 1415    Narrative:       The following orders were created for panel order [facility] Draw.  Procedure                               Abnormality         Status                     ---------                               -----------         ------                     Light Blue Top[[id number]]                                    Final result               Green Top (Gel)[[id number]]                                  Final result               Lavender Top[[id number]]                                     Final result               Gold Top - SST[[id number]]                                   Final result                 Please view results for these tests on the individual orders.    Gold Top - SST [[id number]] Collected:  02/06/19 1301    Specimen:  Blood Updated:  02/06/19 1415     Extra Tube Hold for add-ons.     Comment: Auto resulted.       Blood Gas, Arterial [[id number]] Collected:  02/06/19 1359    Specimen:  Arterial Blood Updated:  02/06/19 1404    Light Blue Top [[id number]] Collected:  02/06/19 1300    Specimen:  Blood Updated:  02/06/19 1400     Extra Tube hold for add-on     Comment: Auto resulted       Green Top (Gel) [[id number]] Collected:  02/06/19 1300    Specimen:  Blood Updated:  02/06/19 1400     Extra Tube Hold for add-ons.     Comment: Auto resulted.       Lavender Top [[id number]] Collected:  02/06/19 1259    Specimen:  Blood Updated:  02/06/19 1400     Extra Tube hold for add-on     Comment: Auto resulted       CBC & Differential [[id number]] Collected:  02/06/19 1259    Specimen:  Blood Updated:  02/06/19 1351    Narrative:       The following orders were created for panel order CBC & Differential.  Procedure                               Abnormality         Status                     ---------                               -----------         ------                     CBC Auto Differential[[id number]]        Abnormal            Final result                 Please view results for these tests on the individual orders.    CBC Auto Differential [[id number]]  (Abnormal) Collected:  02/06/19 1259    Specimen:  Blood Updated:  02/06/19 1351     WBC 24.26 10*3/mm3      RBC 5.50 10*6/mm3      Hemoglobin 15.3 g/dL      Hematocrit 46.0 %      MCV 83.6 fL      MCH 27.8 pg      MCHC 33.3 g/dL      RDW 12.5 %   "    RDW-SD 38.3 fl      MPV 9.1 fL      Platelets 595 10*3/mm3      Neutrophil % 81.1 %      Lymphocyte % 7.9 %      Monocyte % 7.8 %      Eosinophil % 0.0 %      Basophil % 0.4 %      Immature Grans % 2.8 %      Neutrophils, Absolute 19.68 10*3/mm3      Lymphocytes, Absolute 1.91 10*3/mm3      Monocytes, Absolute 1.90 10*3/mm3      Eosinophils, Absolute 0.00 10*3/mm3      Basophils, Absolute 0.09 10*3/mm3      Immature Grans, Absolute 0.68 10*3/mm3      nRBC 0.0 /100 WBC            Start     Ordered    02/06/19 1746  NIPPV (CPAP or BIPAP)  As Needed-RT     Question Answer Comment   Type: AutoBIPAP    NIPPV Mask Interface Per Patient Preference    Max IPAP 20 12-20   Max EPAP 10 5-10       02/06/19 1745    02/06/19 1641  NIPPV (CPAP or BIPAP)  As Needed-RT,   Status:  Canceled     Question Answer Comment   Type: AutoBIPAP    NIPPV Mask Interface Per Patient Preference    Max IPAP 15    Max EPAP 8        02/06/19 1641    02/06/19 1539  NIPPV (CPAP or BIPAP)  Until Discontinued,   Status:  Canceled     Question Answer Comment   Type: AutoBIPAP    NIPPV Mask Interface Per Patient Preference    Max IPAP 15    Max EPAP 8        02/06/19 1539             Physician Progress Notes (last 72 hours) (Notes from 2/4/2019  1:39 PM through 2/7/2019  1:39 PM)      Tammy To APRN at 2/7/2019  7:02 AM          SERVICE: [facility] HOSPITALIST    CONSULTANTS: Diabetic educator    CHIEF COMPLAINT: Follow-up DKA, left lower lobe pneumonia    SUBJECTIVE: The patient reports that he continues to feel anxious but gets relief with Ativan.  He notes breathing is much improved today.  He notes that he was not taking any insulins because \"I do not like to stick myself\".  Fiancé at bedside reports that she will administer insulins, counseled regarding patient need to participate in his own care.  He otherwise denies f/c/soa/chest pain/n/v/d/abdominal pain or other new concerns.    OBJECTIVE:    /91 (BP " "Location: Right arm, Patient Position: Lying)   Pulse 86   Temp 98.2 °F (36.8 °C) (Axillary)   Resp 18   Ht 172.7 cm (68\")   Wt 97.1 kg (214 lb 1.1 oz)   SpO2 97%   BMI 32.55 kg/m²      MEDS/LABS REVIEWED AND ORDERED    enoxaparin 40 mg Subcutaneous Q24H   famotidine 20 mg Intravenous Q12H   guaiFENesin 600 mg Oral BID   insulin regular 10 Units Intravenous Once   ipratropium-albuterol 3 mL Nebulization 4x Daily - RT   levoFLOXacin 750 mg Intravenous Q24H   sodium chloride 3 mL Intravenous Q12H     Physical Exam   Constitutional: He is oriented to person, place, and time. He appears well-developed and well-nourished.   HENT:   Head: Normocephalic and atraumatic.   Eyes: EOM are normal. Pupils are equal, round, and reactive to light.   Cardiovascular: Normal rate and regular rhythm.   Pulmonary/Chest: Effort normal.   Breath sounds absent LLL, scattered wheezes   Abdominal: Soft. Bowel sounds are normal. He exhibits no distension. There is no tenderness. There is no guarding.   Musculoskeletal: He exhibits no edema.   Neurological: He is alert and oriented to person, place, and time.   Skin: Skin is warm and dry.   Psychiatric:   Depressed affect   Vitals reviewed.    LAB/DIAGNOSTICS:    Results for orders placed during the hospital encounter of 02/06/19   Adult Transthoracic Echo Complete W/ Cont if Necessary Per Protocol    Narrative · Estimated EF = 48%.  · Left ventricular systolic function is low normal.        Xr Chest 1 View    Result Date: 2/7/2019  Left lower lobe infiltrate unchanged since yesterday.  This report was finalized on 2/7/2019 8:03 AM by Dr. Juliná Brand MD.      Xr Chest 1 View    Result Date: 2/6/2019  1. Patchy interstitial and alveolar opacities in the left base suspicious for pneumonia. Follow-up to clearing recommended. 2. Improved inspiratory result with near complete resolution of the left-sided effusion since the prior study. Residual trace to small left effusion or chronic " pleural reaction blunting the left CP angle.  This report was finalized on 2/6/2019 2:06 PM by Dr. Rashad Silva MD.      ASSESSMENT/PLAN:  DKA in obese:  Gap closed but CO2 remains low, bolus with LR now and repeat as needed  Electrolyte replacement protocols in place or DKA protocol  Diabetic educator to see    Sepsis secondary to left lower lobe pneumonia:  Continue Levaquin, Mucinex, DuoNeb nebs, a cappella, I S, ambulation as able  White blood cell count/platelets trending down  Monitor     Low normal EF: 48% per echo, likely related to above  Troponin negative on admit  No chest pain or cardiac symptoms  Appears euvolemic on exam  Monitor with IVFs    Hyponatremia: secondary to glucose, continue hydration    Anxiety: ativan as needed    Noncompliance:  Reports that he does not like to stick himself with needles and was not using prescribed insulins    Hyperventilation: resolved, secondary to above    PLAN FOR DISPOSITION: home when able    RUBENS Howard  Hospitalist, [facility]  02/07/19  7:02 AM          Electronically signed by Tammy To APRN at 2/7/2019 12:55 PM          Consult Notes (last 72 hours) (Notes from 2/4/2019  1:40 PM through 2/7/2019  1:40 PM)      Arnulfo Garcia MD at 2/6/2019  7:14 PM      Consult Orders    1. Inpatient Pulmonology Consult [[id number]] ordered by Tammy To APRN at 02/06/19 1550                    [facility]  Phone: [phone]  Arnulfo Garcia MD      Subjective   LOS: 0 days     Thank you for this consultation 53-year-old male who presents with increasing shortness of breath.  Symptoms started at least a week ago.  His  reports crackling in the lungs.  He contacted Dr. Oliver's office where he has had surgery before.  He was advised to get a CT chest but insurance declined.  He canceled his appointment.  He continued to get worse and came into the ED here.  He was noted to have evidence for DKA and has been admitted  to the ICU with DKA protocol.  His chest x-ray showed left lower lobe infiltrate.  He has been started on levofloxacin.  Patient's  states that he does not take any insulin regularly.  He does not have a PCP.  He is not compliant with his treatments.  He did follow-up with Dr. Raphael following a VATS procedure in July for loculated effusions in the left lung.  Subsequent chest x-ray cleared up and the  confirms to me that his chest x-ray last check with Dr. Oliver was clear.  Outside of diabetes patient does not have any significant problems.  He does have some hypertension.  Never smoker.  No history of cancer.  He has been seen by pulmonology in the past when he was admitted to [facility].    Myles Toledo  reports that he drinks alcohol.,  reports that  has never smoked. he has never used smokeless tobacco.  Objective   Past Hx:  has a past medical history of Diabetes mellitus (CMS/[facility]) and Hypertension.  Surg Hx:  has a past surgical history that includes Eye surgery; Inner ear surgery; Thoracoscopy (Left, 7/9/2018); and Chest tube insertion.  FH: family history is not on file.  SH:  reports that  has never smoked. he has never used smokeless tobacco. He reports that he drinks alcohol. He reports that he does not use drugs.    Medications Prior to Admission   Medication Sig Dispense Refill Last Dose   • BABY ASPIRIN PO Take 1 tablet by mouth Daily.   Past Month at Unknown time   • celecoxib (CELEBREX) 200 MG capsule Take 1 capsule by mouth Daily. 30 capsule 1 Not Taking   • ondansetron (ZOFRAN) 8 MG tablet Take 1 tablet by mouth Every 8 (Eight) Hours As Needed for Nausea or Vomiting. 30 tablet 1 Not Taking   • oxyCODONE-acetaminophen (PERCOCET) 5-325 MG per tablet Take 1 tablet by mouth Every 6 (Six) Hours As Needed for pain. 30 tablet 0 Not Taking   • oxyCODONE-acetaminophen (PERCOCET) 7.5-325 MG per tablet Take 1 tablet by mouth Every 6 (Six) Hours As Needed for pain. 60 tablet 0 Not  Taking   • sulfamethoxazole-trimethoprim (BACTRIM DS) 800-160 MG per tablet Take 1 tablet by mouth 2 (Two) Times a Day. 20 tablet 0      Allergies   Allergen Reactions   • Penicillins Unknown (See Comments)     reaction as child  Tolerated Ceftriaxone during 2018 admission       Review of Systems   Constitutional: Positive for fatigue. Negative for chills and fever.   HENT: Positive for congestion. Negative for sore throat.    Respiratory: Positive for cough and shortness of breath. Negative for wheezing.    Cardiovascular: Negative for chest pain and leg swelling.   Gastrointestinal: Positive for nausea. Negative for abdominal pain and diarrhea.   Genitourinary: Negative for dysuria and hematuria.   Musculoskeletal: Negative for arthralgias and back pain.   Skin: Negative for pallor and rash.   Neurological: Negative for seizures and headaches.   Psychiatric/Behavioral: Negative for agitation and confusion.     Vital Signs past 24hrs  BP range: BP: (133-193)/() 133/63  Pulse range: Heart Rate:  [118-133] 133  Resp rate range: Resp:  [24-44] 44  Temp range: Temp (24hrs), Av.9 °F (36.6 °C), Min:97.6 °F (36.4 °C), Max:98.1 °F (36.7 °C)    Oxygen range: SpO2:  [97 %-98 %] 98 %; Flow (L/min):  [2] 2;   Device (Oxygen Therapy): room air  104 kg (230 lb); Body mass index is 34.07 kg/m².  No intake/output data recorded.    Adult male laying in bed.  He appears to have a depressed affect.  Pupils equal and react to light.  Oropharynx dry with a class II-III Mallampati airway.  Nasopharynx without discharge.  Septum midline.  JVP not elevated trachea midline thyroid not enlarged.  Lungs revealed left lower lobe crackles.  Rest of lung fields clear.  Air entry is good bilaterally.  Percussion not resonant bilaterally.  Chest expansion equal no chest wall deformities or tenderness.  Heart examination S1-S2 present rhythm regular.  Somewhat tachycardic.  No murmurs noted.  No edema lower extremities.  Abdomen is  obese, soft, nontender.  Bowel sounds present no liver or spleen enlargement.  No peripheral cyanosis clubbing.  Moves all 4 extremities sensory motor intact.  No cervical, axillary, inguinal adenopathy.    Results Review:    I have reviewed the laboratory and imaging data from current admission. My annotations are as noted in assessment and plan.    Medication Review:  I have reviewed the current MAR. My annotations are as noted in assessment and plan.    Plan   PCCM Problems  Left lower lobe pneumonia  Relevant Medical Diagnoses  DKA  Noncompliant with medical therapy  Hypertension    Plan of Treatment  Pneumonia left lower lobe which requires treatment with antibiotics.  Agree with current antibiotics.  Requires follow-up imaging to make sure imaging clears up.    DKA on protocol per hospitalist.  I discussed with Dr. Agee.  Agree with current treatment.  Metabolic acidosis should resolve with treatment.    Part of this note may be an electronic transcription/translation of spoken language to printed text using the Dragon Dictation System.      Electronically signed by Arnulfo Garcia MD at 2/6/2019  7:20 PM

## 2019-02-07 NOTE — CONSULTS
[facility]  Phone: [phone]  Arnulfo Garcia MD      Subjective   LOS: 0 days     Thank you for this consultation 53-year-old male who presents with increasing shortness of breath.  Symptoms started at least a week ago.  His  reports crackling in the lungs.  He contacted Dr. Oliver's office where he has had surgery before.  He was advised to get a CT chest but insurance declined.  He canceled his appointment.  He continued to get worse and came into the ED here.  He was noted to have evidence for DKA and has been admitted to the ICU with DKA protocol.  His chest x-ray showed left lower lobe infiltrate.  He has been started on levofloxacin.  Patient's  states that he does not take any insulin regularly.  He does not have a PCP.  He is not compliant with his treatments.  He did follow-up with Dr. Raphael following a VATS procedure in July for loculated effusions in the left lung.  Subsequent chest x-ray cleared up and the  confirms to me that his chest x-ray last check with Dr. Oliver was clear.  Outside of diabetes patient does not have any significant problems.  He does have some hypertension.  Never smoker.  No history of cancer.  He has been seen by pulmonology in the past when he was admitted to [facility].    Myles Paris  reports that he drinks alcohol.,  reports that  has never smoked. he has never used smokeless tobacco.     Past Hx:  has a past medical history of Diabetes mellitus (CMS/[facility]) and Hypertension.  Surg Hx:  has a past surgical history that includes Eye surgery; Inner ear surgery; Thoracoscopy (Left, 7/9/2018); and Chest tube insertion.  FH: family history is not on file.  SH:  reports that  has never smoked. he has never used smokeless tobacco. He reports that he drinks alcohol. He reports that he does not use drugs.    Medications Prior to Admission   Medication Sig Dispense Refill Last Dose   • BABY ASPIRIN PO Take 1 tablet by mouth Daily.    Past Month at Unknown time   • celecoxib (CELEBREX) 200 MG capsule Take 1 capsule by mouth Daily. 30 capsule 1 Not Taking   • ondansetron (ZOFRAN) 8 MG tablet Take 1 tablet by mouth Every 8 (Eight) Hours As Needed for Nausea or Vomiting. 30 tablet 1 Not Taking   • oxyCODONE-acetaminophen (PERCOCET) 5-325 MG per tablet Take 1 tablet by mouth Every 6 (Six) Hours As Needed for pain. 30 tablet 0 Not Taking   • oxyCODONE-acetaminophen (PERCOCET) 7.5-325 MG per tablet Take 1 tablet by mouth Every 6 (Six) Hours As Needed for pain. 60 tablet 0 Not Taking   • sulfamethoxazole-trimethoprim (BACTRIM DS) 800-160 MG per tablet Take 1 tablet by mouth 2 (Two) Times a Day. 20 tablet 0      Allergies   Allergen Reactions   • Penicillins Unknown (See Comments)     reaction as child  Tolerated Ceftriaxone during 2018 admission       Review of Systems   Constitutional: Positive for fatigue. Negative for chills and fever.   HENT: Positive for congestion. Negative for sore throat.    Respiratory: Positive for cough and shortness of breath. Negative for wheezing.    Cardiovascular: Negative for chest pain and leg swelling.   Gastrointestinal: Positive for nausea. Negative for abdominal pain and diarrhea.   Genitourinary: Negative for dysuria and hematuria.   Musculoskeletal: Negative for arthralgias and back pain.   Skin: Negative for pallor and rash.   Neurological: Negative for seizures and headaches.   Psychiatric/Behavioral: Negative for agitation and confusion.     Vital Signs past 24hrs  BP range: BP: (133-193)/() 133/63  Pulse range: Heart Rate:  [118-133] 133  Resp rate range: Resp:  [24-44] 44  Temp range: Temp (24hrs), Av.9 °F (36.6 °C), Min:97.6 °F (36.4 °C), Max:98.1 °F (36.7 °C)    Oxygen range: SpO2:  [97 %-98 %] 98 %; Flow (L/min):  [2] 2;   Device (Oxygen Therapy): room air  104 kg (230 lb); Body mass index is 34.07 kg/m².  No intake/output data recorded.    Adult male laying in bed.  He appears to have a  depressed affect.  Pupils equal and react to light.  Oropharynx dry with a class II-III Mallampati airway.  Nasopharynx without discharge.  Septum midline.  JVP not elevated trachea midline thyroid not enlarged.  Lungs revealed left lower lobe crackles.  Rest of lung fields clear.  Air entry is good bilaterally.  Percussion not resonant bilaterally.  Chest expansion equal no chest wall deformities or tenderness.  Heart examination S1-S2 present rhythm regular.  Somewhat tachycardic.  No murmurs noted.  No edema lower extremities.  Abdomen is obese, soft, nontender.  Bowel sounds present no liver or spleen enlargement.  No peripheral cyanosis clubbing.  Moves all 4 extremities sensory motor intact.  No cervical, axillary, inguinal adenopathy.    Results Review:    I have reviewed the laboratory and imaging data from current admission. My annotations are as noted in assessment and plan.    Medication Review:  I have reviewed the current MAR. My annotations are as noted in assessment and plan.    Plan   PCCM Problems  Left lower lobe pneumonia  Relevant Medical Diagnoses  DKA  Noncompliant with medical therapy  Hypertension    Plan of Treatment  Pneumonia left lower lobe which requires treatment with antibiotics.  Agree with current antibiotics.  Requires follow-up imaging to make sure imaging clears up.    DKA on protocol per hospitalist.  I discussed with Dr. Agee.  Agree with current treatment.  Metabolic acidosis should resolve with treatment.    Part of this note may be an electronic transcription/translation of spoken language to printed text using the Dragon Dictation System.

## 2019-02-07 NOTE — PLAN OF CARE
Problem: Patient Care Overview  Goal: Plan of Care Review  Outcome: Ongoing (interventions implemented as appropriate)   02/07/19 0417   Coping/Psychosocial   Plan of Care Reviewed With patient   Plan of Care Review   Progress improving   OTHER   Outcome Summary remains on insulin drip-labs improving. continues to be tachypneic, on room air -sats and b/p stable, low grade temp this shift SR on the monitor.     Goal: Individualization and Mutuality  Outcome: Ongoing (interventions implemented as appropriate)    Goal: Discharge Needs Assessment  Outcome: Ongoing (interventions implemented as appropriate)    Goal: Interprofessional Rounds/Family Conf  Outcome: Ongoing (interventions implemented as appropriate)

## 2019-02-08 LAB — MRSA SPEC QL CULT: NORMAL

## 2019-02-08 NOTE — PAYOR COMM NOTE
"Luz Maria Echavarria (53 y.o. Male)     ATTN: OLIVE  MEMBER ID:[id number]  FAXING REQUESTED CLINICALS FOR INPT REVIEW. PLEASE REPLY TO HENRIETTA STOKES AT FAX#[phone] OR PHONE#[phone]. THANK YOU.       Date of Birth Social Security Number Address Home Phone MRN    1965  [address] [place], [place] [id number]  [id number]    Taoism Marital Status          None Legally        Admission Date Admission Type Admitting Provider Attending Provider Department, Room/Bed    2/6/19 Emergency Gage Agee MD  [facility] ICU, ICU7/1    Discharge Date Discharge Disposition Discharge Destination        2/7/2019 Left Against Medical Advice              Attending Provider:  (none)   Allergies:  Penicillins    Isolation:  None   Infection:  None   Code Status:  Prior    Ht:  172.7 cm (68\")   Wt:  97.1 kg (214 lb 1.1 oz)    Admission Cmt:  None   Principal Problem:  None                Active Insurance as of 2/6/2019     Primary Coverage     Payor Plan Insurance Group Employer/Plan Group    PASSPORT PASSPORT MEDICAID     Payor Plan Address Payor Plan Phone Number Payor Plan Fax Number Effective Dates    [address] [phone]  10/1/2015 - None Entered    [facility] [id number]       Subscriber Name Subscriber Birth Date Member ID       LUZ MARIA ECHAVARRIA 1965 [id number]                 Emergency Contacts      (Rel.) Home Phone Work Phone Mobile Phone    Sugey Riddle (Significant Other) -- -- [phone]               History & Physical      Tammy To, RUBENS at 2/6/2019  3:35 PM     Attestation signed by Gage Agee MD at 2/6/2019  6:57 PM    I have seen and examined Mr. Echavarria, and I have verified practitioner's findings.  I agree w/ diagnoses and plan as documented.                  [facility] HOSPITALIST     Provider, No Known    CHIEF COMPLAINT: Cough, shortness of air, fever    HISTORY OF PRESENT ILLNESS:  The patient is a 53-year-old male that " presented to the emergency department secondary to 7 days of increased cough productive of brownish sputum, shortness of air, fever that worsened over the past 3 days.  He notes unknown amount of weight loss recently due to inability to eat with shortness of air.  Significant other at bedside and notes she has had upper respiratory symptoms recently as well.      He reports history of DKA in the past, unsure regarding sugars or A1C. H/O VATS LLL secondary to empyema 7/2018. He notes no pulmonary issues since that time.     Denies headache/rhinorrhea/nasal congestion/lightheadedness/syncopal sensation/n/v/d/chest pain/abdominal pain/recent illness/change in bowel or bladder habits/bloody emesis or bloody stools/change in medications/tobacco use or any other new concerns.    Past Medical History:   Diagnosis Date   • Diabetes mellitus (CMS/HCC)    • Hypertension      Past Surgical History:   Procedure Laterality Date   • CHEST TUBE INSERTION     • EYE SURGERY     • INNER EAR SURGERY     • THORACOSCOPY Left 7/9/2018    Procedure: BRONCHOSCOPY, LEFT VIDEO ASSISTED THORACOSCOPY WITH DECORITCATION, INTERCOSTAL NERVE BLOCK WITH CRYO ABLATION;  Surgeon: Thmoas Oliver III, MD;  Location: [facility];  Service: Thoracic     History reviewed. No pertinent family history.  Social History     Tobacco Use   • Smoking status: Never Smoker   • Smokeless tobacco: Never Used   Substance Use Topics   • Alcohol use: Yes     Comment: occ   • Drug use: No     Medications Prior to Admission   Medication Sig Dispense Refill Last Dose   • BABY ASPIRIN PO Take 1 tablet by mouth Daily.   Past Month at Unknown time   • celecoxib (CELEBREX) 200 MG capsule Take 1 capsule by mouth Daily. 30 capsule 1 Not Taking   • ondansetron (ZOFRAN) 8 MG tablet Take 1 tablet by mouth Every 8 (Eight) Hours As Needed for Nausea or Vomiting. 30 tablet 1 Not Taking   • oxyCODONE-acetaminophen (PERCOCET) 5-325 MG per tablet Take 1 tablet by mouth Every 6  "(Six) Hours As Needed for pain. 30 tablet 0 Not Taking   • oxyCODONE-acetaminophen (PERCOCET) 7.5-325 MG per tablet Take 1 tablet by mouth Every 6 (Six) Hours As Needed for pain. 60 tablet 0 Not Taking   • sulfamethoxazole-trimethoprim (BACTRIM DS) 800-160 MG per tablet Take 1 tablet by mouth 2 (Two) Times a Day. 20 tablet 0      Allergies:  Penicillins      There is no immunization history on file for this patient.    REVIEW OF SYSTEMS:  Please see the above history of present illness for pertinent positives and negatives.  The remainder of the patient's systems have been reviewed and are negative.     Vital Signs  Temp:  [97.6 °F (36.4 °C)] 97.6 °F (36.4 °C)  Heart Rate:  [118-129] 129  Resp:  [24-43] 40  BP: (178-193)/() 193/75    Flowsheet Rows      First Filed Value   Admission Height  175 cm (68.9\") Documented at 02/06/2019 1235   Admission Weight  104 kg (230 lb) Documented at 02/06/2019 1235           Physical Exam:  Physical Exam   Constitutional: Patient appears well-developed and well-nourished and in mild acute distress   HEENT:   Head: Normocephalic and atraumatic.   Eyes:  Pupils are equal, round, and reactive to light. EOM are intact. Sclera are anicteric and non-injected.  Mouth and Throat: Patient has moist mucous membranes. Oropharynx is clear of any erythema or exudate.     Neck: Neck supple. No JVD present. No thyromegaly present. No lymphadenopathy present.  Cardiovascular: tachycardic rate, regular rhythm, S1 normal and S2 normal.  Exam reveals no gallop and no friction rub.  No murmur heard.  Pulmonary/Chest: Lungs coarse bilateral breath sounds throughout, rhonchi, decreased LLL, tachypneic  Abdominal: obese, Soft. Bowel sounds are normal. No distension and no mass. There is no hepatosplenomegaly. There is no tenderness.   Musculoskeletal: Normal Muscle tone  Extremities: No edema. Pulses are palpable in all 4 extremities.  Neurological: Patient is alert and oriented to person, place, " and time. Cranial nerves II-XII are grossly intact with no focal deficits.  Skin: Skin is warm. No rash noted. Nails show no clubbing.  No cyanosis or erythema.    Emotional Behavior:    Judgement and Insight: fair   Mental Status:  Alertness  alert   Memory:  fair   Mood and Affect:         Depression  mild               Anxiety  moderate    Debilities:   Physical Weakness  None observed   Handicaps  None observed   Disabilities  None    Agitation  none     Results Review:    I reviewed the patient's new clinical results.  Lab Results (most recent)     Procedure Component Value Units Date/Time    Ketone Bodies, Serum (Not performed at [place]) [[id number]] Collected:  02/06/19 1259    Specimen:  Blood Updated:  02/06/19 1502    Narrative:       The following orders were created for panel order Ketone Bodies, Serum (Not performed at [place]).  Procedure                               Abnormality         Status                     ---------                               -----------         ------                     Acetone[[id number]]                      Abnormal            Final result                 Please view results for these tests on the individual orders.    Acetone [[id number]]  (Abnormal) Collected:  02/06/19 1259    Specimen:  Blood Updated:  02/06/19 1502     Acetone Small    Blood Culture - Blood, Hand, Right [[id number]] Collected:  02/06/19 1428    Specimen:  Blood from Hand, Right Updated:  02/06/19 1433    Respiratory Culture - Sputum, Cough [[id number]] Collected:  02/06/19 1414    Specimen:  Sputum from Cough Updated:  02/06/19 1433    BNP [[id number]]  (Abnormal) Collected:  02/06/19 1259    Specimen:  Blood Updated:  02/06/19 1417     proBNP 358.7 pg/mL     Narrative:       Among patients with dyspnea, NT-proBNP is highly sensitive for the detection of acute congestive heart failure. In addition NT-proBNP of <300 pg/ml effectively rules out acute congestive heart failure with 99% negative predictive  value.    [place] Draw [[id number]] Collected:  02/06/19 1259    Specimen:  Blood Updated:  02/06/19 1415    Narrative:       The following orders were created for panel order [place] Draw.  Procedure                               Abnormality         Status                     ---------                               -----------         ------                     Light Blue Top[[id number]]                                   Final result               Green Top (Gel)[[id number]]                                  Final result               Lavender Top[[id number]]                                     Final result               Gold Top - SST[[id number]]                                   Final result                 Please view results for these tests on the individual orders.    Gold Top - SST [[id number]] Collected:  02/06/19 1301    Specimen:  Blood Updated:  02/06/19 1415     Extra Tube Hold for add-ons.     Comment: Auto resulted.       Blood Gas, Arterial [[id number]] Collected:  02/06/19 1359    Specimen:  Arterial Blood Updated:  02/06/19 1404    Light Blue Top [[id number]] Collected:  02/06/19 1300    Specimen:  Blood Updated:  02/06/19 1400     Extra Tube hold for add-on     Comment: Auto resulted       Green Top (Gel) [[id number]] Collected:  02/06/19 1300    Specimen:  Blood Updated:  02/06/19 1400     Extra Tube Hold for add-ons.     Comment: Auto resulted.       Lavender Top [[id number]] Collected:  02/06/19 1259    Specimen:  Blood Updated:  02/06/19 1400     Extra Tube hold for add-on     Comment: Auto resulted       CBC & Differential [[id number]] Collected:  02/06/19 1259    Specimen:  Blood Updated:  02/06/19 1351    Narrative:       The following orders were created for panel order CBC & Differential.  Procedure                               Abnormality         Status                     ---------                               -----------         ------                     CBC Auto Differential[[id number]]         Abnormal            Final result                 Please view results for these tests on the individual orders.    CBC Auto Differential [[id number]]  (Abnormal) Collected:  02/06/19 1259    Specimen:  Blood Updated:  02/06/19 1351     WBC 24.26 10*3/mm3      RBC 5.50 10*6/mm3      Hemoglobin 15.3 g/dL      Hematocrit 46.0 %      MCV 83.6 fL      MCH 27.8 pg      MCHC 33.3 g/dL      RDW 12.5 %      RDW-SD 38.3 fl      MPV 9.1 fL      Platelets 595 10*3/mm3      Neutrophil % 81.1 %      Lymphocyte % 7.9 %      Monocyte % 7.8 %      Eosinophil % 0.0 %      Basophil % 0.4 %      Immature Grans % 2.8 %      Neutrophils, Absolute 19.68 10*3/mm3      Lymphocytes, Absolute 1.91 10*3/mm3      Monocytes, Absolute 1.90 10*3/mm3      Eosinophils, Absolute 0.00 10*3/mm3      Basophils, Absolute 0.09 10*3/mm3      Immature Grans, Absolute 0.68 10*3/mm3      nRBC 0.0 /100 WBC     Lactic Acid, Plasma [[id number]]  (Abnormal) Collected:  02/06/19 1313    Specimen:  Blood Updated:  02/06/19 1339     Lactate 2.1 mmol/L     Lactic Acid, Reflex Timer (This will reflex a repeat order 3-3:15 hours after ordered.) [[id number]] Collected:  02/06/19 1313    Specimen:  Blood Updated:  02/06/19 1339    Troponin [[id number]]  (Normal) Collected:  02/06/19 1300    Specimen:  Blood Updated:  02/06/19 1325     Troponin T <0.010 ng/mL     Narrative:       Troponin T Reference Range:  <= 0.03 ng/mL-   Negative for AMI  >0.03 ng/mL-     Abnormal for myocardial necrosis.  Clinicians would have to utilize clinical acumen, EKG, Troponin and serial changes to determine if it is an Acute Myocardial Infarction or myocardial injury due to an underlying chronic condition.     Comprehensive Metabolic Panel [[id number]]  (Abnormal) Collected:  02/06/19 1300    Specimen:  Blood Updated:  02/06/19 1323     Glucose 306 mg/dL      BUN 7 mg/dL      Creatinine 0.92 mg/dL      Sodium 129 mmol/L      Potassium 4.4 mmol/L      Chloride 94 mmol/L      CO2 6.2 mmol/L       Calcium 9.5 mg/dL      Total Protein 9.3 g/dL      Albumin 3.70 g/dL      ALT (SGPT) 6 U/L      AST (SGOT) 8 U/L      Alkaline Phosphatase 215 U/L      Total Bilirubin 0.4 mg/dL      eGFR Non African Amer 86 mL/min/1.73      Globulin 5.6 gm/dL      A/G Ratio 0.7 g/dL      BUN/Creatinine Ratio 7.6     Anion Gap 28.8 mmol/L     Blood Culture - Blood, Arm, Left [[id number]] Collected:  02/06/19 1259    Specimen:  Blood from Arm, Left Updated:  02/06/19 1307          Imaging Results (most recent)     Procedure Component Value Units Date/Time    XR Chest 1 View [[id number]] Collected:  02/06/19 1404     Updated:  02/06/19 1408    Narrative:       FRONTAL CHEST 2/6/2019         HISTORY:  Worsening shortness of air that began last week. Sweating profusely.     TECHNIQUE:  Frontal chest was performed. Comparisons 7/7/2018     FINDINGS:  Cardiac silhouette is within normal limits. Vascularity is unremarkable.  Improved inspiratory result. The right lung appears clear. There has  been interval near complete resolution of a left-sided effusion. There  is blunting of the left CP angle either reflecting a residual trace to  small effusion or pleural reaction. There are patchy interstitial and  alveolar opacities in the left base, suspicious for pneumonia. No  pneumothorax.       Impression:       1. Patchy interstitial and alveolar opacities in the left base  suspicious for pneumonia. Follow-up to clearing recommended.  2. Improved inspiratory result with near complete resolution of the  left-sided effusion since the prior study. Residual trace to small left  effusion or chronic pleural reaction blunting the left CP angle.     This report was finalized on 2/6/2019 2:06 PM by Dr. Rashad Silva MD.           reviewed    ECG/EMG Results (most recent)     Procedure Component Value Units Date/Time    ECG 12 Lead [[id number]] Collected:  02/06/19 1239     Updated:  02/06/19 1359    Narrative:       HEART RATE= 114  bpm  RR Interval= 524   ms  AK Interval= 149  ms  P Horizontal Axis= 45  deg  P Front Axis= 66  deg  QRSD Interval= 111  ms  QT Interval= 338  ms  QRS Axis= -25  deg  T Wave Axis= 26  deg  - OTHERWISE NORMAL ECG -  Sinus tachycardia  Borderline left axis deviation  Electronically Signed By:   Date and Time of Study: 2019-02-06 12:39:27    ECG 12 Lead [[id number]] Resulted:  02/06/19 1459     Updated:  02/06/19 1459        reviewed    Assessment/Plan   Sepsis secondary to LLL pneumonia: Consult pulmonary  H/O VATS LLL/empyema 7/2018:  Received azithromycin, Rocephin, to Levaquin  Add duo nebs, Mucinex, acappella  BiPAP on standby, abg pending now  Check pro-calcitonin, Legionella, strep pneumo, sputum culture, MRSA swab, respiratory viral panel  Check echo  NPO  Monitor in ICU    DKA in obese: check A1C  Body mass index is 34.07 kg/m².  Reportedly noncompliant with checking sugars, no medications noted in med rec  DKA protocol initiated  Hyponatremia: Corrects for glucose  CO2 6.2, recheck per protocol    AHRF secondary to above:   Hyperventilating, recheck ABG stat, no result in chart for previous ABG this afternoon    Leukocytosis/thrombocytosis:  Left shift  Likely dehydration secondary to all above, continue fluid resuscitation/abx, recheck in am    Discussed the patients findings and my recommendations with patient and fiance at bedside.     Tammy To, RUBENS  02/06/19  3:59 PM            Electronically signed by Gage Agee MD at 2/6/2019  6:57 PM       ICU Vital Signs     Row Name 02/07/19 1215 02/07/19 1205 02/07/19 1154 02/07/19 1152 02/07/19 1100       Height and Weight    Weight  --  --  --  97.1 kg (214 lb 1.1 oz)  --       Vitals    Temp  --  --  --  --  98.2 °F (36.8 °C)    Temp src  --  --  --  --  Axillary    Heart Rate Source  --  Monitor  Monitor  --  Monitor    Resp  --  18  18  --  --    Resp Rate Source  --  Visual  Visual  --  --    BP  --  --  --  --  141/91    BP Location  --  --  --  --  Right arm    BP  Method  --  --  --  --  Automatic    Patient Position  --  --  --  --  Lying       Oxygen Therapy    Pulse Oximetry Type  --  Continuous  Continuous  --  --    Device (Oxygen Therapy)  room air  room air  room air  --  room air        Lines, Drains & Airways    Active LDAs     None         Inactive LDAs     Name:   Placement date:   Placement time:   Removal date:   Removal time:   Site:   Days:    [REMOVED] Peripheral IV 02/06/19 1330 Right Antecubital   02/06/19    1330    02/06/19    1900    Antecubital   less than 1    [REMOVED] Peripheral IV 02/06/19 02/06/19    --    02/06/19    0000    --   less than 1    [REMOVED] Peripheral IV 02/06/19 1738 Left Antecubital   02/06/19    1738    02/07/19    1600    Antecubital   less than 1    [REMOVED] Peripheral IV 02/06/19 1930 Left Wrist   02/06/19    1930    02/07/19    1600    Wrist   less than 1                Hospital Medications (all)       Dose Frequency Start End    albuterol (PROVENTIL) nebulizer solution 0.083% 2.5 mg/3mL 2.5 mg Every 15 Minutes 2/6/2019 2/6/2019    Sig - Route: Take 2.5 mg by nebulization Every 15 (Fifteen) Minutes. - Nebulization    cefTRIAXone (ROCEPHIN) IVPB 2 g/50ml dextrose (premix) 2 g Once 2/6/2019 2/6/2019    Sig - Route: Infuse 50 mL into a venous catheter 1 (One) Time. - Intravenous    insulin aspart (novoLOG) 100 UNIT/ML injection  - ADS Override Pull   2/6/2019 2/7/2019    Notes to Pharmacy: Created by cabinet override    insulin aspart (novoLOG) injection 5 Units 5 Units Once 2/6/2019 2/6/2019    Sig - Route: Inject 5 Units under the skin into the appropriate area as directed 1 (One) Time. - Subcutaneous    insulin bolus from bag 10 Units 10 Units Once 2/6/2019 2/6/2019    Sig - Route: Infuse 10 mL into a venous catheter 1 (One) Time. - Intravenous    insulin regular (humuLIN R,novoLIN R) injection 12 Units 12 Units Once 2/6/2019 2/6/2019    Sig - Route: Inject 12 Units under the skin into the appropriate area as directed 1  "(One) Time. - Subcutaneous    lactated ringers infusion 1,000 mL 1,000 mL Once 2/7/2019 2/7/2019    Sig - Route: Infuse 1,000 mL into a venous catheter 1 (One) Time. - Intravenous    LORazepam (ATIVAN) injection 1 mg 1 mg Once 2/6/2019 2/6/2019    Sig - Route: Infuse 0.5 mL into a venous catheter 1 (One) Time. - Intravenous    Cosign for Ordering: Accepted by Alfonso Phan MD on 2/6/2019  2:59 PM    LORazepam (ATIVAN) injection 1 mg 1 mg Once 2/6/2019 2/6/2019    Sig - Route: Infuse 0.5 mL into a venous catheter 1 (One) Time. - Intravenous    perflutren (DEFINITY) 8.476 mg in sodium chloride 0.9 % 10 mL injection 2 mL Once 2/7/2019 2/7/2019    Sig - Route: Infuse 2 mL into a venous catheter 1 (One) Time. - Intravenous    potassium chloride (K-DUR,KLOR-CON) 20 MEQ CR tablet  - ADS Override Pull   2/7/2019 2/7/2019    Notes to Pharmacy: Created by cabinet override    sodium chloride 0.9 % bolus 1,000 mL 1,000 mL Once 2/6/2019 2/6/2019    Sig - Route: Infuse 1,000 mL into a venous catheter 1 (One) Time. - Intravenous    sodium chloride 0.9% - IBW for BMI > 30 bolus 2,115 mL 30 mL/kg × 70.5 kg (Ideal) Once 2/6/2019 2/6/2019    Sig - Route: Infuse 2,115 mL into a venous catheter 1 (One) Time. - Intravenous    acetaminophen (TYLENOL) suppository 650 mg (Discontinued) 650 mg Every 4 Hours PRN 2/6/2019 2/7/2019    Sig - Route: Insert 1 suppository into the rectum Every 4 (Four) Hours As Needed for Fever (temperature greater than 101.5 F or headache). - Rectal    Reason for Discontinue: Patient Discharge    Linked Group 1:  \"Or\" Linked Group Details        acetaminophen (TYLENOL) tablet 650 mg (Discontinued) 650 mg Every 4 Hours PRN 2/6/2019 2/7/2019    Sig - Route: Take 2 tablets by mouth Every 4 (Four) Hours As Needed for Headache or Fever (fever greater than 101.5 F). - Oral    Reason for Discontinue: Patient Discharge    Linked Group 1:  \"Or\" Linked Group Details        azithromycin 500 MG/250 ML 0.9% NS IVPB (MBP) " (Discontinued) 500 mg Once 2/6/2019 2/6/2019    Sig - Route: Infuse 250 mL into a venous catheter 1 (One) Time. - Intravenous    dextrose (D50W) 25 g/ 50mL Intravenous Solution 12.5 g (Discontinued) 12.5 g Every 15 Minutes PRN 2/6/2019 2/7/2019    Sig - Route: Infuse 25 mL into a venous catheter Every 15 (Fifteen) Minutes As Needed for Low Blood Sugar (for blood glucose < 100 mg/dL). - Intravenous    Reason for Discontinue: Patient Discharge    dextrose 5 % and sodium chloride 0.45 % infusion (Discontinued) 150 mL/hr Continuous PRN 2/6/2019 2/7/2019    Sig - Route: Infuse 150 mL/hr into a venous catheter Continuous As Needed (Once Glucose Less Than or Equal to 200 mg/dL and Serum Potassium Greater Than 5). - Intravenous    Reason for Discontinue: Patient Discharge    dextrose 5 % and sodium chloride 0.45 % with KCl 20 mEq/L infusion (Discontinued) 150 mL/hr Continuous PRN 2/6/2019 2/7/2019    Sig - Route: Infuse 150 mL/hr into a venous catheter Continuous As Needed (Once Glucose Less Than or Equal to 200 mg/dL and Serum Potassium Less Than or Equal to 5). - Intravenous    Reason for Discontinue: Patient Discharge    enoxaparin (LOVENOX) syringe 40 mg (Discontinued) 40 mg Every 24 Hours 2/6/2019 2/7/2019    Sig - Route: Inject 0.4 mL under the skin into the appropriate area as directed Daily. - Subcutaneous    Reason for Discontinue: Patient Discharge    famotidine (PEPCID) injection 20 mg (Discontinued) 20 mg Every 12 Hours Scheduled 2/6/2019 2/7/2019    Sig - Route: Infuse 2 mL into a venous catheter Every 12 (Twelve) Hours. - Intravenous    Reason for Discontinue: Patient Discharge    guaiFENesin (MUCINEX) 12 hr tablet 600 mg (Discontinued) 600 mg 2 Times Daily 2/6/2019 2/7/2019    Sig - Route: Take 1 tablet by mouth 2 (Two) Times a Day. - Oral    Reason for Discontinue: Patient Discharge    hydrALAZINE (APRESOLINE) injection 20 mg (Discontinued) 20 mg Every 4 Hours PRN 2/6/2019 2/7/2019    Sig - Route: Infuse 1  mL into a venous catheter Every 4 (Four) Hours As Needed for High Blood Pressure. - Intravenous    Reason for Discontinue: Patient Discharge    insulin bolus from bag 10 Units (Discontinued) 10 Units Once As Needed 2/6/2019 2/7/2019    Sig - Route: Infuse 10 mL into a venous catheter 1 (One) Time As Needed (If glucose does not decrease by 10% in the first hour after start of infusion.). - Intravenous    Reason for Discontinue: Patient Discharge    insulin bolus from bag 5 Units (Discontinued) 5 Units Once As Needed 2/6/2019 2/7/2019    Sig - Route: Infuse 5 mL into a venous catheter 1 (One) Time As Needed (If BG drops to </= 200mg/dL THEN rises to >/= 300 mg/dL.). - Intravenous    Reason for Discontinue: Patient Discharge    insulin regular (HumuLIN R,NovoLIN R) 100 Units in sodium chloride 0.9 % 100 mL (1 Units/mL) infusion (Discontinued) 0.1 Units/kg/hr × 104 kg Titrated 2/6/2019 2/7/2019    Sig - Route: Infuse 10 Units/hr into a venous catheter Dose Adjusted By Provider As Needed. - Intravenous    Reason for Discontinue: Patient Discharge    insulin regular (humuLIN R,novoLIN R) injection 10 Units (Discontinued) 10 Units Once 2/6/2019 2/7/2019    Sig - Route: Infuse 10 Units into a venous catheter 1 (One) Time. - Intravenous    Reason for Discontinue: Patient Discharge    ipratropium-albuterol (DUO-NEB) nebulizer solution 3 mL (Discontinued) 3 mL 4 Times Daily - RT 2/6/2019 2/7/2019    Sig - Route: Take 3 mL by nebulization 4 (Four) Times a Day. - Nebulization    Reason for Discontinue: Patient Discharge    ipratropium-albuterol (DUO-NEB) nebulizer solution 3 mL (Discontinued) 3 mL Every 6 Hours PRN 2/6/2019 2/7/2019    Sig - Route: Take 3 mL by nebulization Every 6 (Six) Hours As Needed for Wheezing or Shortness of Air. - Nebulization    Reason for Discontinue: Patient Discharge    ketorolac (TORADOL) injection 15 mg (Discontinued) 15 mg Every 6 Hours PRN 2/7/2019 2/7/2019    Sig - Route: Inject 0.5 mL into the  "appropriate muscle as directed by prescriber Every 6 (Six) Hours As Needed for Moderate Pain  or Severe Pain . - Intramuscular    ketorolac (TORADOL) injection 15 mg (Discontinued) 15 mg Every 6 Hours PRN 2/7/2019 2/7/2019    Sig - Route: Infuse 0.5 mL into a venous catheter Every 6 (Six) Hours As Needed for Moderate Pain  or Severe Pain . - Intravenous    Reason for Discontinue: Patient Discharge    levoFLOXacin (LEVAQUIN) 750 mg/150 mL D5W (premix) (LEVAQUIN) 750 mg (Discontinued) 750 mg Every 24 Hours 2/6/2019 2/7/2019    Sig - Route: Infuse 150 mL into a venous catheter Daily. - Intravenous    Reason for Discontinue: Patient Discharge    LORazepam (ATIVAN) injection 1 mg (Discontinued) 1 mg Every 4 Hours PRN 2/6/2019 2/7/2019    Sig - Route: Infuse 0.5 mL into a venous catheter Every 4 (Four) Hours As Needed for Anxiety or Agitation. - Intravenous    Reason for Discontinue: Patient Discharge    Magnesium Sulfate 2 gram / 50mL Infusion (GIVE X 3 BAGS TO EQUAL 6GM TOTAL DOSE) - Mg 1.1 - 1.5 mg/dl (Discontinued) 2 g As Needed 2/7/2019 2/7/2019    Sig - Route: Infuse 50 mL into a venous catheter As Needed (See Administration Instructions). - Intravenous    Reason for Discontinue: Patient Discharge    Linked Group 2:  \"Or\" Linked Group Details        Magnesium Sulfate 2 gram Bolus, followed by 8 gram infusion (total Mg dose 10 grams)- Mg less than or equal to 1mg/dL (Discontinued) 2 g As Needed 2/7/2019 2/7/2019    Sig - Route: Infuse 50 mL into a venous catheter As Needed (See Administration Instructions). - Intravenous    Reason for Discontinue: Patient Discharge    Linked Group 2:  \"Or\" Linked Group Details        Magnesium Sulfate 4 gram infusion- Mg 1.6-1.9 mg/dL (Discontinued) 4 g As Needed 2/7/2019 2/7/2019    Sig - Route: Infuse 100 mL into a venous catheter As Needed (See Administration Instructions). - Intravenous    Reason for Discontinue: Patient Discharge    Linked Group 2:  \"Or\" Linked Group Details     " "   ondansetron (ZOFRAN) injection 4 mg (Discontinued) 4 mg Every 6 Hours PRN 2/6/2019 2/7/2019    Sig - Route: Infuse 2 mL into a venous catheter Every 6 (Six) Hours As Needed for Nausea or Vomiting. - Intravenous    Reason for Discontinue: Patient Discharge    Linked Group 3:  \"Or\" Linked Group Details        ondansetron (ZOFRAN) tablet 4 mg (Discontinued) 4 mg Every 6 Hours PRN 2/6/2019 2/7/2019    Sig - Route: Take 1 tablet by mouth Every 6 (Six) Hours As Needed for Nausea or Vomiting. - Oral    Reason for Discontinue: Patient Discharge    Linked Group 3:  \"Or\" Linked Group Details        ondansetron ODT (ZOFRAN-ODT) disintegrating tablet 4 mg (Discontinued) 4 mg Every 6 Hours PRN 2/6/2019 2/7/2019    Sig - Route: Take 1 tablet by mouth Every 6 (Six) Hours As Needed for Nausea or Vomiting. - Oral    Reason for Discontinue: Patient Discharge    Linked Group 3:  \"Or\" Linked Group Details        Pharmacy to Dose enoxaparin (LOVENOX) (Discontinued)  Continuous PRN 2/6/2019 2/7/2019    Sig - Route: Continuous As Needed for Consult. - Does not apply    Reason for Discontinue: Patient Discharge    potassium chloride (KLOR-CON) packet 10 mEq (Discontinued) 10 mEq As Needed 2/6/2019 2/7/2019    Sig - Route: Take 10 mEq by mouth As Needed (Potassium replacement per admin instructions). - Oral    Reason for Discontinue: Patient Discharge    Linked Group 4:  \"Or\" Linked Group Details        potassium chloride (KLOR-CON) packet 20 mEq (Discontinued) 20 mEq As Needed 2/6/2019 2/7/2019    Sig - Route: Take 20 mEq by mouth As Needed (Potassium replacement per admin instructions). - Oral    Reason for Discontinue: Patient Discharge    Linked Group 5:  \"Or\" Linked Group Details        potassium chloride (KLOR-CON) packet 40 mEq (Discontinued) 40 mEq As Needed 2/6/2019 2/7/2019    Sig - Route: Take 40 mEq by mouth As Needed (Potassium replacement per admin instructions). - Oral    Reason for Discontinue: Patient Discharge    Linked " "Group 6:  \"Or\" Linked Group Details        potassium chloride (MICRO-K) CR capsule 10 mEq (Discontinued) 10 mEq As Needed 2/6/2019 2/7/2019    Sig - Route: Take 1 capsule by mouth As Needed (Potassium replacement per admin instructions). - Oral    Reason for Discontinue: Patient Discharge    Linked Group 4:  \"Or\" Linked Group Details        potassium chloride (MICRO-K) CR capsule 20 mEq (Discontinued) 20 mEq As Needed 2/6/2019 2/7/2019    Sig - Route: Take 2 capsules by mouth As Needed (Potassium replacement per admin instructions). - Oral    Reason for Discontinue: Patient Discharge    Linked Group 5:  \"Or\" Linked Group Details        potassium chloride (MICRO-K) CR capsule 40 mEq (Discontinued) 40 mEq As Needed 2/6/2019 2/7/2019    Sig - Route: Take 4 capsules by mouth As Needed (Potassium replacement per admin instructions). - Oral    Reason for Discontinue: Patient Discharge    Linked Group 6:  \"Or\" Linked Group Details        potassium chloride 10 mEq in 100 mL IVPB (Discontinued) 10 mEq As Needed 2/6/2019 2/7/2019    Sig - Route: Infuse 100 mL into a venous catheter As Needed (Potassium replacement per admin instructions). - Intravenous    Reason for Discontinue: Patient Discharge    Linked Group 6:  \"Or\" Linked Group Details        potassium chloride 10 mEq in 100 mL IVPB (Discontinued) 10 mEq As Needed 2/6/2019 2/7/2019    Sig - Route: Infuse 100 mL into a venous catheter As Needed (Potassium replacement per admin instructions). - Intravenous    Reason for Discontinue: Patient Discharge    Linked Group 5:  \"Or\" Linked Group Details        potassium chloride 10 mEq in 100 mL IVPB (Discontinued) 10 mEq As Needed 2/6/2019 2/7/2019    Sig - Route: Infuse 100 mL into a venous catheter As Needed (Potassium replacement per admin instructions). - Intravenous    Reason for Discontinue: Patient Discharge    Linked Group 4:  \"Or\" Linked Group Details        potassium phosphate 15 mmol in sodium chloride 0.9 % 100 mL " "infusion (Discontinued) 15 mmol As Needed 2/7/2019 2/7/2019    Sig - Route: Infuse 15 mmol into a venous catheter As Needed (Phosphorus replacement - see admin instructions). - Intravenous    Reason for Discontinue: Patient Discharge    Linked Group 7:  \"Or\" Linked Group Details        potassium phosphate 30 mmol in sodium chloride 0.9 % 250 mL infusion (Discontinued) 30 mmol As Needed 2/7/2019 2/7/2019    Sig - Route: Infuse 30 mmol into a venous catheter As Needed (Phosphorus replacement - see admin instructions). - Intravenous    Reason for Discontinue: Patient Discharge    Linked Group 7:  \"Or\" Linked Group Details        potassium phosphate 45 mmol in sodium chloride 0.9 % 500 mL infusion (Discontinued) 45 mmol As Needed 2/7/2019 2/7/2019    Sig - Route: Infuse 45 mmol into a venous catheter As Needed (Phosphorus replacement - see admin instruction). - Intravenous    Reason for Discontinue: Patient Discharge    Linked Group 7:  \"Or\" Linked Group Details        sodium chloride 0.45 % infusion (Discontinued) 250 mL/hr Continuous 2/6/2019 2/7/2019    Sig - Route: Infuse 250 mL/hr into a venous catheter Continuous. - Intravenous    Reason for Discontinue: Patient Discharge    sodium chloride 0.45 % with KCl 20 mEq/L infusion (Discontinued) 250 mL/hr Continuous PRN 2/6/2019 2/7/2019    Sig - Route: Infuse 250 mL/hr into a venous catheter Continuous As Needed (After Initial 2 Hours - If Potassium Level is Less Than or Equal to 5 (If Greater Than 5 use 0.45%)). - Intravenous    Reason for Discontinue: Patient Discharge    sodium chloride 0.9 % flush 10 mL (Discontinued) 10 mL As Needed 2/6/2019 2/7/2019    Sig - Route: Infuse 10 mL into a venous catheter As Needed for Line Care. - Intravenous    Reason for Discontinue: Patient Discharge    Cosign for Ordering: Accepted by Alfonso Phan MD on 2/6/2019  2:59 PM    sodium chloride 0.9 % flush 3 mL (Discontinued) 3 mL Every 12 Hours Scheduled 2/6/2019 2/7/2019    Sig - " "Route: Infuse 3 mL into a venous catheter Every 12 (Twelve) Hours. - Intravenous    Reason for Discontinue: Patient Discharge    sodium chloride 0.9 % flush 3-10 mL (Discontinued) 3-10 mL As Needed 2/6/2019 2/7/2019    Sig - Route: Infuse 3-10 mL into a venous catheter As Needed for Line Care. - Intravenous    Reason for Discontinue: Patient Discharge    sodium chloride 0.9 % infusion 40 mL (Discontinued) 40 mL As Needed 2/6/2019 2/7/2019    Sig - Route: Infuse 40 mL into a venous catheter As Needed for Line Care. - Intravenous    Reason for Discontinue: Patient Discharge    sodium phosphates 15 mmol in sodium chloride 0.9 % 250 mL IVPB (Discontinued) 15 mmol As Needed 2/7/2019 2/7/2019    Sig - Route: Infuse 15 mmol into a venous catheter As Needed (Peripheral IV - Phosphorus 1.8 - 2.5 mg/dL & Potassium Greater Than 4). - Intravenous    Reason for Discontinue: Patient Discharge    Linked Group 7:  \"Or\" Linked Group Details        sodium phosphates 30 mmol in sodium chloride 0.9 % 250 mL IVPB (Discontinued) 30 mmol As Needed 2/7/2019 2/7/2019    Sig - Route: Infuse 30 mmol into a venous catheter As Needed (Peripheral IV - Phosphorus 1.3-1.7 mg/dL & Potassium Greater Than 4). - Intravenous    Reason for Discontinue: Patient Discharge    Linked Group 7:  \"Or\" Linked Group Details        sodium phosphates 45 mmol in sodium chloride 0.9 % 500 mL IVPB (Discontinued) 45 mmol As Needed 2/7/2019 2/7/2019    Sig - Route: Infuse 45 mmol into a venous catheter As Needed (Peripheral IV - Phosphorus Less Than 1.3 mg/dL & Potassium Greater Than 4). - Intravenous    Reason for Discontinue: Patient Discharge    Linked Group 7:  \"Or\" Linked Group Details        traMADol (ULTRAM) tablet 50 mg (Discontinued) 50 mg Every 6 Hours PRN 2/7/2019 2/7/2019    Sig - Route: Take 1 tablet by mouth Every 6 (Six) Hours As Needed for Moderate Pain . - Oral    Reason for Discontinue: Patient Discharge          Blood Administration Record (From " admission, onward)    None          Lab Results (last 24 hours)     Procedure Component Value Units Date/Time    Blood Culture - Blood, Hand, Right [[id number]] Collected:  02/06/19 1428    Specimen:  Blood from Hand, Right Updated:  02/07/19 1445     Blood Culture No growth at 24 hours    POC Glucose Once [[id number]]  (Abnormal) Collected:  02/07/19 1402    Specimen:  Blood Updated:  02/07/19 1408     Glucose 189 mg/dL     Procalcitonin [[id number]]  (Abnormal) Collected:  02/07/19 1214    Specimen:  Blood Updated:  02/07/19 1342     Procalcitonin 0.07 ng/mL     Narrative:       As a Marker for Sepsis (Non-Neonates):   1. <0.5 ng/mL represents a low risk of severe sepsis and/or septic shock.  2. >2 ng/mL represents a high risk of severe sepsis and/or septic shock.    As a Marker for Lower Respiratory Tract Infections that require antibiotic therapy:    PCT on Admission     Antibiotic Therapy       6-12 Hrs later  > 0.5                Strongly Recommended             >0.25 - <0.5         Recommended  0.1 - 0.25           Discouraged              Remeasure/reassess PCT  <0.1                 Strongly Discouraged     Remeasure/reassess PCT                     PCT values of < 0.5 ng/mL do not exclude an infection, because localized infections (without systemic signs) may be associated with such low concentrations, or a systemic infection in its initial stages (< 6 hours). Furthermore, increased PCT can occur without infection. PCT concentrations between 0.5 and 2.0 ng/mL should be interpreted taking into account the patient's history. It is recommended to retest PCT within 6-24 hours if any concentrations < 2 ng/mL are obtained.    Blood Culture - Blood, Arm, Left [[id number]] Collected:  02/06/19 1259    Specimen:  Blood from Arm, Left Updated:  02/07/19 1315     Blood Culture No growth at 24 hours    Magnesium [[id number]]  (Normal) Collected:  02/07/19 1214    Specimen:  Blood Updated:  02/07/19 1238     Magnesium 2.2  mg/dL     Basic Metabolic Panel [[id number]]  (Abnormal) Collected:  02/07/19 1214    Specimen:  Blood Updated:  02/07/19 1233     Glucose 189 mg/dL      BUN 4 mg/dL      Creatinine 0.43 mg/dL      Sodium 131 mmol/L      Potassium 3.4 mmol/L      Chloride 105 mmol/L      CO2 15.2 mmol/L      Calcium 8.4 mg/dL      eGFR Non African Amer >150 mL/min/1.73      BUN/Creatinine Ratio 9.3     Anion Gap 10.8 mmol/L     Narrative:       GFR Normal >60  Chronic Kidney Disease <60  Kidney Failure <15    Phosphorus [[id number]]  (Abnormal) Collected:  02/07/19 1214    Specimen:  Blood Updated:  02/07/19 1233     Phosphorus 1.0 mg/dL     POC Glucose Once [[id number]]  (Abnormal) Collected:  02/07/19 1201    Specimen:  Blood Updated:  02/07/19 1210     Glucose 204 mg/dL     POC Glucose Once [[id number]]  (Abnormal) Collected:  02/07/19 0949    Specimen:  Blood Updated:  02/07/19 0955     Glucose 166 mg/dL     Phosphorus [[id number]]  (Abnormal) Collected:  02/07/19 0828    Specimen:  Blood Updated:  02/07/19 0855     Phosphorus 1.1 mg/dL     Basic Metabolic Panel [[id number]]  (Abnormal) Collected:  02/07/19 0828    Specimen:  Blood Updated:  02/07/19 0855     Glucose 149 mg/dL      BUN 5 mg/dL      Creatinine 0.51 mg/dL      Sodium 133 mmol/L      Potassium 3.6 mmol/L      Chloride 107 mmol/L      CO2 13.9 mmol/L      Calcium 8.9 mg/dL      eGFR Non African Amer >150 mL/min/1.73      BUN/Creatinine Ratio 9.8     Anion Gap 12.1 mmol/L     Narrative:       GFR Normal >60  Chronic Kidney Disease <60  Kidney Failure <15    Magnesium [[id number]]  (Abnormal) Collected:  02/07/19 0828    Specimen:  Blood Updated:  02/07/19 0855     Magnesium 1.6 mg/dL     POC Glucose Once [[id number]]  (Abnormal) Collected:  02/07/19 0813    Specimen:  Blood Updated:  02/07/19 0827     Glucose 160 mg/dL     POC Glucose Once [[id number]]  (Abnormal) Collected:  02/07/19 0608    Specimen:  Blood Updated:  02/07/19 0624     Glucose 165 mg/dL     Phosphorus  [[id number]]  (Abnormal) Collected:  02/07/19 0356    Specimen:  Blood Updated:  02/07/19 0453     Phosphorus 1.0 mg/dL     Basic Metabolic Panel [[id number]]  (Abnormal) Collected:  02/07/19 0356    Specimen:  Blood Updated:  02/07/19 0453     Glucose 160 mg/dL      BUN 6 mg/dL      Creatinine 0.52 mg/dL      Sodium 132 mmol/L      Potassium 3.6 mmol/L      Chloride 105 mmol/L      CO2 13.3 mmol/L      Calcium 8.6 mg/dL      eGFR Non African Amer >150 mL/min/1.73      BUN/Creatinine Ratio 11.5     Anion Gap 13.7 mmol/L     Narrative:       GFR Normal >60  Chronic Kidney Disease <60  Kidney Failure <15    Magnesium [[id number]]  (Normal) Collected:  02/07/19 0356    Specimen:  Blood Updated:  02/07/19 0453     Magnesium 1.7 mg/dL     CBC & Differential [[id number]] Collected:  02/07/19 0356    Specimen:  Blood Updated:  02/07/19 0439    Narrative:       The following orders were created for panel order CBC & Differential.  Procedure                               Abnormality         Status                     ---------                               -----------         ------                     CBC Auto Differential[[id number]]        Abnormal            Final result                 Please view results for these tests on the individual orders.    CBC Auto Differential [[id number]]  (Abnormal) Collected:  02/07/19 0356    Specimen:  Blood Updated:  02/07/19 0439     WBC 22.45 10*3/mm3      RBC 4.40 10*6/mm3      Hemoglobin 12.3 g/dL      Hematocrit 34.7 %      MCV 78.9 fL      MCH 28.0 pg      MCHC 35.4 g/dL      RDW 12.5 %      RDW-SD 35.6 fl      MPV 8.7 fL      Platelets 506 10*3/mm3      Neutrophil % 79.3 %      Lymphocyte % 8.9 %      Monocyte % 10.1 %      Eosinophil % 0.1 %      Basophil % 0.2 %      Immature Grans % 1.4 %      Neutrophils, Absolute 17.81 10*3/mm3      Lymphocytes, Absolute 1.99 10*3/mm3      Monocytes, Absolute 2.27 10*3/mm3      Eosinophils, Absolute 0.02 10*3/mm3      Basophils, Absolute 0.05  10*3/mm3      Immature Grans, Absolute 0.31 10*3/mm3      nRBC 0.0 /100 WBC     POC Glucose Once [[id number]]  (Abnormal) Collected:  02/07/19 0355    Specimen:  Blood Updated:  02/07/19 0413     Glucose 166 mg/dL     POC Glucose Once [[id number]]  (Abnormal) Collected:  02/07/19 0157    Specimen:  Blood Updated:  02/07/19 0230     Glucose 177 mg/dL     POC Glucose Once [[id number]]  (Abnormal) Collected:  02/07/19 0108    Specimen:  Blood Updated:  02/07/19 0121     Glucose 170 mg/dL     Phosphorus [[id number]]  (Abnormal) Collected:  02/07/19 0008    Specimen:  Blood Updated:  02/07/19 0039     Phosphorus 0.8 mg/dL     Basic Metabolic Panel [[id number]]  (Abnormal) Collected:  02/07/19 0008    Specimen:  Blood Updated:  02/07/19 0039     Glucose 168 mg/dL      BUN 7 mg/dL      Creatinine 0.59 mg/dL      Sodium 131 mmol/L      Potassium 3.7 mmol/L      Chloride 104 mmol/L      CO2 14.4 mmol/L      Calcium 8.7 mg/dL      eGFR Non African Amer 143 mL/min/1.73      BUN/Creatinine Ratio 11.9     Anion Gap 12.6 mmol/L     Narrative:       GFR Normal >60  Chronic Kidney Disease <60  Kidney Failure <15    Magnesium [[id number]]  (Normal) Collected:  02/07/19 0008    Specimen:  Blood Updated:  02/07/19 0039     Magnesium 1.7 mg/dL     POC Glucose Once [[id number]]  (Abnormal) Collected:  02/07/19 0008    Specimen:  Blood Updated:  02/07/19 0020     Glucose 169 mg/dL     POC Glucose Once [[id number]]  (Abnormal) Collected:  02/06/19 2310    Specimen:  Blood Updated:  02/06/19 2338     Glucose 163 mg/dL     POC Glucose Once [[id number]]  (Abnormal) Collected:  02/06/19 2210    Specimen:  Blood Updated:  02/06/19 2217     Glucose 178 mg/dL     Blood Gas, Arterial [[id number]]  (Abnormal) Collected:  02/06/19 2200    Specimen:  Arterial Blood Updated:  02/06/19 2203     Site Right Brachial     Mike's Test N/A     pH, Arterial 7.403 pH units      pCO2, Arterial 20.3 mm Hg      Comment: 84 Value below reference range        pO2, Arterial  71.2 mm Hg      Comment: 84 Value below reference range        HCO3, Arterial 12.7 mmol/L      Comment: 84 Value below reference range        Base Excess, Arterial -9.8 mmol/L      Comment: 84 Value below reference range        O2 Saturation, Arterial 96.9 %      Hemoglobin, Blood Gas 13.1 g/dL      Comment: 84 Value below reference range        Temperature 37.0 C      Barometric Pressure for Blood Gas 737 mmHg      Modality Room Air     Ventilator Mode NA     Comment: Meter: [id number]     :  [id number]        pCO2, Temperature Corrected 20.3 mm Hg      pH, Temp Corrected 7.403 pH Units      pO2, Temperature Corrected 71.2 mm Hg     Respiratory Panel, PCR - Swab, Nasopharynx [[id number]]  (Normal) Collected:  02/06/19 1650    Specimen:  Swab from Nasopharynx Updated:  02/06/19 2157     ADENOVIRUS, PCR Not Detected     Coronavirus 229E Not Detected     Coronavirus HKU1 Not Detected     Coronavirus NL63 Not Detected     Coronavirus OC43 Not Detected     Human Metapneumovirus Not Detected     Human Rhinovirus/Enterovirus Not Detected     Influenza B PCR Not Detected     Parainfluenza Virus 1 Not Detected     Parainfluenza Virus 2 Not Detected     Parainfluenza Virus 3 Not Detected     Parainfluenza Virus 4 Not Detected     Bordetella pertussis pcr Not Detected     Influenza A H1 2009 PCR Not Detected     Chlamydophila pneumoniae PCR Not Detected     Mycoplasma pneumo by PCR Not Detected     Influenza A PCR Not Detected     Influenza A H3 Not Detected     Influenza A H1 Not Detected     RSV, PCR Not Detected     Bordetella parapertussis PCR Not Detected    POC Glucose Once [[id number]]  (Abnormal) Collected:  02/06/19 2109    Specimen:  Blood Updated:  02/06/19 2115     Glucose 169 mg/dL     Phosphorus [[id number]]  (Abnormal) Collected:  02/06/19 2025    Specimen:  Blood Updated:  02/06/19 2052     Phosphorus 1.1 mg/dL     Basic Metabolic Panel [[id number]]  (Abnormal) Collected:  02/06/19 2025    Specimen:   Blood Updated:  02/06/19 2052     Glucose 181 mg/dL      BUN 7 mg/dL      Creatinine 0.72 mg/dL      Sodium 133 mmol/L      Potassium 3.7 mmol/L      Chloride 103 mmol/L      CO2 11.0 mmol/L      Calcium 9.0 mg/dL      eGFR Non African Amer 114 mL/min/1.73      BUN/Creatinine Ratio 9.7     Anion Gap 19.0 mmol/L     Narrative:       GFR Normal >60  Chronic Kidney Disease <60  Kidney Failure <15    Magnesium [[id number]]  (Normal) Collected:  02/06/19 2025    Specimen:  Blood Updated:  02/06/19 2052     Magnesium 1.7 mg/dL     POC Glucose Once [[id number]]  (Abnormal) Collected:  02/06/19 2010    Specimen:  Blood Updated:  02/06/19 2017     Glucose 201 mg/dL     POC Glucose Once [[id number]]  (Abnormal) Collected:  02/06/19 1907    Specimen:  Blood Updated:  02/06/19 1920     Glucose 316 mg/dL     Urinalysis, Microscopic Only - Urine, Clean Catch [[id number]]  (Abnormal) Collected:  02/06/19 1804    Specimen:  Urine, Clean Catch Updated:  02/06/19 1859     RBC, UA 3-5 /HPF      WBC, UA None Seen /HPF      Bacteria, UA None Seen /HPF      Squamous Epithelial Cells, UA 0-2 /HPF      Hyaline Casts, UA None Seen /LPF      Methodology Manual Light Microscopy    Urinalysis With Culture If Indicated - Urine, Clean Catch [[id number]]  (Abnormal) Collected:  02/06/19 1804    Specimen:  Urine, Clean Catch Updated:  02/06/19 1843     Color, UA Straw     Appearance, UA Clear     pH, UA <=5.0     Specific Gravity, UA 1.020     Glucose,  mg/dL (2+)     Ketones, UA >=160 mg/dL (4+)     Bilirubin, UA Negative     Blood, UA Small (1+)     Protein,  mg/dL (2+)     Leuk Esterase, UA Negative     Nitrite, UA Negative     Urobilinogen, UA 0.2 E.U./dL    S. Pneumo Ag Urine or CSF - Urine, Urine, Clean Catch [[id number]]  (Normal) Collected:  02/06/19 1804    Specimen:  Urine, Clean Catch Updated:  02/06/19 1843     Strep Pneumo Ag Negative    Legionella Antigen, Urine - Urine, Urine, Clean Catch [[id number]]  (Normal) Collected:   02/06/19 1804    Specimen:  Urine, Clean Catch Updated:  02/06/19 1841     LEGIONELLA ANTIGEN, URINE Negative    Lactic Acid, Reflex [[id number]]  (Abnormal) Collected:  02/06/19 1732    Specimen:  Blood Updated:  02/06/19 1801     Lactate 3.4 mmol/L     Comprehensive Metabolic Panel [[id number]]  (Abnormal) Collected:  02/06/19 1732    Specimen:  Blood Updated:  02/06/19 1800     Glucose 375 mg/dL      BUN 8 mg/dL      Creatinine 0.93 mg/dL      Sodium 131 mmol/L      Potassium 4.2 mmol/L      Chloride 100 mmol/L      CO2 6.1 mmol/L      Calcium 8.8 mg/dL      Total Protein 8.0 g/dL      Albumin 3.40 g/dL      ALT (SGPT) 5 U/L      AST (SGOT) 6 U/L      Alkaline Phosphatase 190 U/L      Total Bilirubin 0.2 mg/dL      eGFR Non African Amer 85 mL/min/1.73      Globulin 4.6 gm/dL      A/G Ratio 0.7 g/dL      BUN/Creatinine Ratio 8.6     Anion Gap 24.9 mmol/L     Phosphorus [[id number]]  (Abnormal) Collected:  02/06/19 1732    Specimen:  Blood Updated:  02/06/19 1800     Phosphorus 2.1 mg/dL     Magnesium [[id number]]  (Normal) Collected:  02/06/19 1732    Specimen:  Blood Updated:  02/06/19 1800     Magnesium 1.9 mg/dL     Blood Gas, Arterial [[id number]]  (Abnormal) Collected:  02/06/19 1750    Specimen:  Arterial Blood Updated:  02/06/19 1757     Site Left Radial     Mike's Test Positive     pH, Arterial 7.245 pH units      Comment: 84 Value below reference range        pCO2, Arterial 12.4 mm Hg      Comment: 85 Value below critical limit        pO2, Arterial 101.0 mm Hg      HCO3, Arterial 5.4 mmol/L      Comment: 84 Value below reference range        Base Excess, Arterial -19.1 mmol/L      Comment: 84 Value below reference range        O2 Saturation, Arterial 98.2 %      Hemoglobin, Blood Gas 14.1 g/dL      Temperature 37.0 C      Barometric Pressure for Blood Gas 737 mmHg      Modality Room Air     Ventilator Mode NA     Comment: Meter: [id number]     :  [id number]        Notified Who rb and ruy pruitt      Notified By [id number]     Notified Time 02/06/2019 17:58     pCO2, Temperature Corrected 12.4 mm Hg      pH, Temp Corrected 7.245 pH Units      pO2, Temperature Corrected 101 mm Hg     POC Glucose Once [[id number]]  (Abnormal) Collected:  02/06/19 1746    Specimen:  Blood Updated:  02/06/19 1755     Glucose 390 mg/dL     CBC & Differential [[id number]] Collected:  02/06/19 1732    Specimen:  Blood Updated:  02/06/19 1741    Narrative:       The following orders were created for panel order CBC & Differential.  Procedure                               Abnormality         Status                     ---------                               -----------         ------                     CBC Auto Differential[[id number]]        Abnormal            Final result                 Please view results for these tests on the individual orders.    CBC Auto Differential [[id number]]  (Abnormal) Collected:  02/06/19 1732    Specimen:  Blood Updated:  02/06/19 1741     WBC 24.55 10*3/mm3      RBC 4.99 10*6/mm3      Hemoglobin 13.8 g/dL      Hematocrit 41.4 %      MCV 83.0 fL      MCH 27.7 pg      MCHC 33.3 g/dL      RDW 12.7 %      RDW-SD 38.5 fl      MPV 8.6 fL      Platelets 515 10*3/mm3      Neutrophil % 85.5 %      Lymphocyte % 3.7 %      Monocyte % 7.1 %      Eosinophil % 0.0 %      Basophil % 0.3 %      Immature Grans % 3.4 %      Neutrophils, Absolute 20.98 10*3/mm3      Lymphocytes, Absolute 0.91 10*3/mm3      Monocytes, Absolute 1.75 10*3/mm3      Eosinophils, Absolute 0.00 10*3/mm3      Basophils, Absolute 0.08 10*3/mm3      Immature Grans, Absolute 0.83 10*3/mm3      nRBC 0.0 /100 WBC     Lactic Acid, Reflex Timer (This will reflex a repeat order 3-3:15 hours after ordered.) [[id number]] Collected:  02/06/19 1313    Specimen:  Blood Updated:  02/06/19 1645     Extra Tube Hold for add-ons.     Comment: Auto resulted.       POC Glucose Once [[id number]]  (Abnormal) Collected:  02/06/19 1627    Specimen:  Blood Updated:  02/06/19  1636     Glucose 458 mg/dL     Procalcitonin [[id number]]  (Abnormal) Collected:  02/06/19 1259    Specimen:  Blood Updated:  02/06/19 1618     Procalcitonin 0.03 ng/mL     Narrative:       As a Marker for Sepsis (Non-Neonates):   1. <0.5 ng/mL represents a low risk of severe sepsis and/or septic shock.  2. >2 ng/mL represents a high risk of severe sepsis and/or septic shock.    As a Marker for Lower Respiratory Tract Infections that require antibiotic therapy:    PCT on Admission     Antibiotic Therapy       6-12 Hrs later  > 0.5                Strongly Recommended             >0.25 - <0.5         Recommended  0.1 - 0.25           Discouraged              Remeasure/reassess PCT  <0.1                 Strongly Discouraged     Remeasure/reassess PCT                     PCT values of < 0.5 ng/mL do not exclude an infection, because localized infections (without systemic signs) may be associated with such low concentrations, or a systemic infection in its initial stages (< 6 hours). Furthermore, increased PCT can occur without infection. PCT concentrations between 0.5 and 2.0 ng/mL should be interpreted taking into account the patient's history. It is recommended to retest PCT within 6-24 hours if any concentrations < 2 ng/mL are obtained.    Hemoglobin A1c [[id number]]  (Abnormal) Collected:  02/06/19 1259    Specimen:  Blood Updated:  02/06/19 1609     Hemoglobin A1C 14.40 %     Narrative:       Hemoglobin A1C Ranges:    Increased Risk for Diabetes  5.7% to 6.4%  Diabetes                     >= 6.5%  Diabetic Goal                < 7.0%        Imaging Results (last 72 hours)     Procedure Component Value Units Date/Time    XR Chest 1 View [[id number]] Collected:  02/07/19 0802     Updated:  02/07/19 0805    Narrative:       CHEST X-RAY, 2/7/2019 (07:51)         HISTORY:  53-year-old male admitted to the ICU yesterday with diabetic  ketoacidosis. One week history shortness of air. Left lower lobe  pneumonia on imaging.  Exam for follow-up.     TECHNIQUE:  AP portable upright chest x-ray.     FINDINGS:  Persistent moderate patchy airspace infiltrate in the left lower lobe,  unchanged since yesterday. Remaining portions of both lungs are clear.  Heart size and pulmonary vascularity are normal.       Impression:       Left lower lobe infiltrate unchanged since yesterday.     This report was finalized on 2/7/2019 8:03 AM by Dr. Julián Brand MD.       XR Chest 1 View [[id number]] Collected:  02/06/19 1404     Updated:  02/06/19 1408    Narrative:       FRONTAL CHEST 2/6/2019         HISTORY:  Worsening shortness of air that began last week. Sweating profusely.     TECHNIQUE:  Frontal chest was performed. Comparisons 7/7/2018     FINDINGS:  Cardiac silhouette is within normal limits. Vascularity is unremarkable.  Improved inspiratory result. The right lung appears clear. There has  been interval near complete resolution of a left-sided effusion. There  is blunting of the left CP angle either reflecting a residual trace to  small effusion or pleural reaction. There are patchy interstitial and  alveolar opacities in the left base, suspicious for pneumonia. No  pneumothorax.       Impression:       1. Patchy interstitial and alveolar opacities in the left base  suspicious for pneumonia. Follow-up to clearing recommended.  2. Improved inspiratory result with near complete resolution of the  left-sided effusion since the prior study. Residual trace to small left  effusion or chronic pleural reaction blunting the left CP angle.     This report was finalized on 2/6/2019 2:06 PM by Dr. Rashad Silva MD.             ECG/EMG Results (last 72 hours)     Procedure Component Value Units Date/Time    ECG 12 Lead [[id number]] Resulted:  02/06/19 1459     Updated:  02/06/19 1459    ECG 12 Lead [[id number]] Collected:  02/06/19 1239     Updated:  02/06/19 1647    Narrative:       HEART RATE= 114  bpm  RR Interval= 524  ms  RI Interval= 149  ms  P  Horizontal Axis= 45  deg  P Front Axis= 66  deg  QRSD Interval= 111  ms  QT Interval= 338  ms  QRS Axis= -25  deg  T Wave Axis= 26  deg  - OTHERWISE NORMAL ECG -  Sinus tachycardia  Borderline left axis deviation  No change from prior tracing  Electronically Signed By: Edita Douglas ([facility]) 06-Feb-2019 16:47:07  Date and Time of Study: 2019-02-06 12:39:27    Adult Transthoracic Echo Complete W/ Cont if Necessary Per Protocol [[id number]] Collected:  02/07/19 0702     Updated:  02/07/19 0902     BSA 2.1 m^2      IVSd 1.0 cm      LVIDd 3.8 cm      LVIDs 2.8 cm      LVPWd 1.1 cm      IVS/LVPW 0.99     FS 26.7 %      EDV(Teich) 60.0 ml      ESV(Teich) 28.3 ml      EF(Teich) 52.9 %      EDV(cubed) 52.7 ml      ESV(cubed) 20.8 ml      EF(cubed) 60.6 %      LV mass(C)d 122.4 grams      LV mass(C)dI 58.2 grams/m^2      SV(Teich) 31.8 ml      SI(Teich) 15.1 ml/m^2      SV(cubed) 31.9 ml      SI(cubed) 15.2 ml/m^2      Ao root diam 3.4 cm      Ao root area 8.8 cm^2      ACS 2.4 cm      LA dimension 3.1 cm      LA/Ao 0.93     LVOT diam 2.4 cm      LVOT area 4.5 cm^2      LVOT area(traced) 4.5 cm^2      RVOT diam 2.6 cm      RVOT area 5.3 cm^2      LVLd ap4 8.5 cm      EDV(MOD-sp4) 135.0 ml      LVLs ap4 7.6 cm      ESV(MOD-sp4) 69.9 ml      EF(MOD-sp4) 48.2 %      LVLd ap2 9.2 cm      EDV(MOD-sp2) 160.0 ml      LVLs ap2 8.1 cm      ESV(MOD-sp2) 93.0 ml      EF(MOD-sp2) 41.9 %      SV(MOD-sp4) 65.1 ml      SI(MOD-sp4) 30.9 ml/m^2      SV(MOD-sp2) 67.0 ml      SI(MOD-sp2) 31.9 ml/m^2      Ao root area (BSA corrected) 1.6     LV Bowman Vol (BSA corrected) 64.2 ml/m^2      LV Sys Vol (BSA corrected) 33.2 ml/m^2      MV A dur 0.1 sec      MV E max michell 75.9 cm/sec      MV A max michell 99.8 cm/sec      MV E/A 0.76     MV V2 max 113.0 cm/sec      MV max PG 5.1 mmHg      MV V2 mean 75.2 cm/sec      MV mean PG 3.0 mmHg      MV V2 VTI 21.1 cm      MVA(VTI) 3.3 cm^2      MV P1/2t max michell 83.4 cm/sec      MV P1/2t 105.7 msec      MVA(P1/2t)  2.1 cm^2      MV dec slope 231.0 cm/sec^2      MV dec time 0.14 sec      Ao pk keaton 132.0 cm/sec      Ao max PG 7.0 mmHg      Ao max PG (full) 2.0 mmHg      Ao V2 mean 94.9 cm/sec      Ao mean PG 4.0 mmHg      Ao mean PG (full) 2.0 mmHg      Ao V2 VTI 19.8 cm      DALLAS(I,A) 3.5 cm^2      DALLAS(I,D) 3.5 cm^2      DALLAS(V,A) 3.8 cm^2      DALLAS(V,D) 3.8 cm^2      LV V1 max PG 5.0 mmHg      LV V1 mean PG 2.0 mmHg      LV V1 max 112.0 cm/sec      LV V1 mean 61.7 cm/sec      LV V1 VTI 15.4 cm      SV(Ao) 174.5 ml      SI(Ao) 83.0 ml/m^2      SV(LVOT) 69.7 ml      SV(RVOT) 70.6 ml      SI(LVOT) 33.1 ml/m^2      PA V2 max 77.3 cm/sec      PA max PG 2.4 mmHg      PA max PG (full) -0.45 mmHg       CV ECHO UNIQUE - PVA(V,A) 5.8 cm^2       CV ECHO UNIQUE - PVA(V,D) 5.8 cm^2      PA acc time 0.09 sec      RV V1 max PG 2.8 mmHg      RV V1 mean PG 2.0 mmHg      RV V1 max 84.2 cm/sec      RV V1 mean 60.2 cm/sec      RV V1 VTI 13.3 cm      PA pr(Accel) 37.6 mmHg      Pulm Sys Keaton 67.6 cm/sec      Pulm Bowman Keaton 38.1 cm/sec      Pulm S/D 1.8     Qp/Qs 1.0     Pulm A Revs Dur 0.12 sec      Pulm A Revs Keaton 36.7 cm/sec      MVA P1/2T LCG 2.6 cm^2       CV ECHO UNIQUE - BZI_BMI 32.5 kilograms/m^2       CV ECHO UNIQUE - BSA(HAYCOCK) 2.2 m^2       CV ECHO UNIQUE - BZI_METRIC_WEIGHT 97.1 kg       CV ECHO UNIQUE - BZI_METRIC_HEIGHT 172.7 cm      Target HR (85%) 141 bpm      Max. Pred. HR (100%) 166 bpm      BH CV VAS BP RIGHT /79 mmHg      TDI S' 14.00 cm/sec      RV Base 4.60 cm      LA Volume Index 19.0 mL/m2      Avg E/e' ratio 7.23     EF(MOD-bp) 48 %      Lat Peak E' Keaton 13.0 cm/sec      Med Peak E' Keaton 8.00 cm/sec      TAPSE (>1.6) 2.40 cm2      Echo EF Estimated 48 %     Narrative:       · Estimated EF = 48%.  · Left ventricular systolic function is low normal.             Ventilator/Non-Invasive Ventilation Settings (From admission, onward)    Start     Ordered    02/06/19 8107  NIPPV (CPAP or BIPAP)  As Needed-RT,   Status:  Canceled  "    Question Answer Comment   Type: AutoBIPAP    NIPPV Mask Interface Per Patient Preference    Max IPAP 20 12-20   Max EPAP 10 5-10       02/06/19 1745    02/06/19 1641  NIPPV (CPAP or BIPAP)  As Needed-RT,   Status:  Canceled     Question Answer Comment   Type: AutoBIPAP    NIPPV Mask Interface Per Patient Preference    Max IPAP 15    Max EPAP 8        02/06/19 1641    02/06/19 1539  NIPPV (CPAP or BIPAP)  Until Discontinued,   Status:  Canceled     Question Answer Comment   Type: AutoBIPAP    NIPPV Mask Interface Per Patient Preference    Max IPAP 15    Max EPAP 8        02/06/19 1539          Operative/Procedure Notes (last 72 hours) (Notes from 2/5/2019 10:53 AM through 2/8/2019 10:53 AM)     No notes of this type exist for this encounter.           Physician Progress Notes (last 72 hours) (Notes from 2/5/2019 10:53 AM through 2/8/2019 10:53 AM)      Tammy To, APRN at 2/7/2019  7:02 AM          SERVICE: [facility] HOSPITALIST    CONSULTANTS: Diabetic educator    CHIEF COMPLAINT: Follow-up DKA, left lower lobe pneumonia    SUBJECTIVE: The patient reports that he continues to feel anxious but gets relief with Ativan.  He notes breathing is much improved today.  He notes that he was not taking any insulins because \"I do not like to stick myself\".  Fiancé at bedside reports that she will administer insulins, counseled regarding patient need to participate in his own care.  He otherwise denies f/c/soa/chest pain/n/v/d/abdominal pain or other new concerns.    OBJECTIVE:    /91 (BP Location: Right arm, Patient Position: Lying)   Pulse 86   Temp 98.2 °F (36.8 °C) (Axillary)   Resp 18   Ht 172.7 cm (68\")   Wt 97.1 kg (214 lb 1.1 oz)   SpO2 97%   BMI 32.55 kg/m²      MEDS/LABS REVIEWED AND ORDERED    enoxaparin 40 mg Subcutaneous Q24H   famotidine 20 mg Intravenous Q12H   guaiFENesin 600 mg Oral BID   insulin regular 10 Units Intravenous Once   ipratropium-albuterol 3 mL " Nebulization 4x Daily - RT   levoFLOXacin 750 mg Intravenous Q24H   sodium chloride 3 mL Intravenous Q12H     Physical Exam   Constitutional: He is oriented to person, place, and time. He appears well-developed and well-nourished.   HENT:   Head: Normocephalic and atraumatic.   Eyes: EOM are normal. Pupils are equal, round, and reactive to light.   Cardiovascular: Normal rate and regular rhythm.   Pulmonary/Chest: Effort normal.   Breath sounds absent LLL, scattered wheezes   Abdominal: Soft. Bowel sounds are normal. He exhibits no distension. There is no tenderness. There is no guarding.   Musculoskeletal: He exhibits no edema.   Neurological: He is alert and oriented to person, place, and time.   Skin: Skin is warm and dry.   Psychiatric:   Depressed affect   Vitals reviewed.    Results for orders placed during the hospital encounter of 02/06/19   Adult Transthoracic Echo Complete W/ Cont if Necessary Per Protocol    Narrative · Estimated EF = 48%.  · Left ventricular systolic function is low normal.        Xr Chest 1 View    Result Date: 2/7/2019  Left lower lobe infiltrate unchanged since yesterday.  This report was finalized on 2/7/2019 8:03 AM by Dr. Julián Brand MD.      Xr Chest 1 View    Result Date: 2/6/2019  1. Patchy interstitial and alveolar opacities in the left base suspicious for pneumonia. Follow-up to clearing recommended. 2. Improved inspiratory result with near complete resolution of the left-sided effusion since the prior study. Residual trace to small left effusion or chronic pleural reaction blunting the left CP angle.  This report was finalized on 2/6/2019 2:06 PM by Dr. Rashad Silva MD.      ASSESSMENT/PLAN:  DKA in obese:  Gap closed but CO2 remains low, bolus with LR now and repeat as needed  Electrolyte replacement protocols in place or DKA protocol  Diabetic educator to see    Sepsis secondary to left lower lobe pneumonia:  Continue Levaquin, Mucinex, Duhilario Dias, MICHAEL S,  ambulation as able  White blood cell count/platelets trending down  Monitor     Low normal EF: 48% per echo, likely related to above  Troponin negative on admit  No chest pain or cardiac symptoms  Appears euvolemic on exam  Monitor with IVFs    Hyponatremia: secondary to glucose, continue hydration    Anxiety: ativan as needed    Noncompliance:  Reports that he does not like to stick himself with needles and was not using prescribed insulins    Hyperventilation: resolved, secondary to above    PLAN FOR DISPOSITION: home when able    RUBENS Howard  Hospitalist, [facility]  02/07/19  7:02 AM          Electronically signed by Tammy To APRN at 2/7/2019 12:55 PM          Consult Notes (last 72 hours) (Notes from 2/5/2019 10:53 AM through 2/8/2019 10:53 AM)      Arnulfo Garcia MD at 2/6/2019  7:14 PM      Consult Orders    1. Inpatient Pulmonology Consult [[id number]] ordered by Tammy To APRN at 02/06/19 1550                    [facility]  Phone: [phone]  Arnulfo Garcia MD      Subjective   LOS: 0 days     Thank you for this consultation 53-year-old male who presents with increasing shortness of breath.  Symptoms started at least a week ago.  His  reports crackling in the lungs.  He contacted Dr. Oliver's office where he has had surgery before.  He was advised to get a CT chest but insurance declined.  He canceled his appointment.  He continued to get worse and came into the ED here.  He was noted to have evidence for DKA and has been admitted to the ICU with DKA protocol.  His chest x-ray showed left lower lobe infiltrate.  He has been started on levofloxacin.  Patient's  states that he does not take any insulin regularly.  He does not have a PCP.  He is not compliant with his treatments.  He did follow-up with Dr. Raphael following a VATS procedure in July for loculated effusions in the left lung.  Subsequent chest x-ray cleared up and the   confirms to me that his chest x-ray last check with Dr. Oliver was clear.  Outside of diabetes patient does not have any significant problems.  He does have some hypertension.  Never smoker.  No history of cancer.  He has been seen by pulmonology in the past when he was admitted to [facility].    Myles Toledo  reports that he drinks alcohol.,  reports that  has never smoked. he has never used smokeless tobacco.  Objective   Past Hx:  has a past medical history of Diabetes mellitus (CMS/HCC) and Hypertension.  Surg Hx:  has a past surgical history that includes Eye surgery; Inner ear surgery; Thoracoscopy (Left, 7/9/2018); and Chest tube insertion.  FH: family history is not on file.  SH:  reports that  has never smoked. he has never used smokeless tobacco. He reports that he drinks alcohol. He reports that he does not use drugs.    Medications Prior to Admission   Medication Sig Dispense Refill Last Dose   • BABY ASPIRIN PO Take 1 tablet by mouth Daily.   Past Month at Unknown time   • celecoxib (CELEBREX) 200 MG capsule Take 1 capsule by mouth Daily. 30 capsule 1 Not Taking   • ondansetron (ZOFRAN) 8 MG tablet Take 1 tablet by mouth Every 8 (Eight) Hours As Needed for Nausea or Vomiting. 30 tablet 1 Not Taking   • oxyCODONE-acetaminophen (PERCOCET) 5-325 MG per tablet Take 1 tablet by mouth Every 6 (Six) Hours As Needed for pain. 30 tablet 0 Not Taking   • oxyCODONE-acetaminophen (PERCOCET) 7.5-325 MG per tablet Take 1 tablet by mouth Every 6 (Six) Hours As Needed for pain. 60 tablet 0 Not Taking   • sulfamethoxazole-trimethoprim (BACTRIM DS) 800-160 MG per tablet Take 1 tablet by mouth 2 (Two) Times a Day. 20 tablet 0      Allergies   Allergen Reactions   • Penicillins Unknown (See Comments)     reaction as child  Tolerated Ceftriaxone during July 2018 admission       Review of Systems   Constitutional: Positive for fatigue. Negative for chills and fever.   HENT: Positive for congestion.  Negative for sore throat.    Respiratory: Positive for cough and shortness of breath. Negative for wheezing.    Cardiovascular: Negative for chest pain and leg swelling.   Gastrointestinal: Positive for nausea. Negative for abdominal pain and diarrhea.   Genitourinary: Negative for dysuria and hematuria.   Musculoskeletal: Negative for arthralgias and back pain.   Skin: Negative for pallor and rash.   Neurological: Negative for seizures and headaches.   Psychiatric/Behavioral: Negative for agitation and confusion.     Vital Signs past 24hrs  BP range: BP: (133-193)/() 133/63  Pulse range: Heart Rate:  [118-133] 133  Resp rate range: Resp:  [24-44] 44  Temp range: Temp (24hrs), Av.9 °F (36.6 °C), Min:97.6 °F (36.4 °C), Max:98.1 °F (36.7 °C)    Oxygen range: SpO2:  [97 %-98 %] 98 %; Flow (L/min):  [2] 2;   Device (Oxygen Therapy): room air  104 kg (230 lb); Body mass index is 34.07 kg/m².  No intake/output data recorded.    Adult male laying in bed.  He appears to have a depressed affect.  Pupils equal and react to light.  Oropharynx dry with a class II-III Mallampati airway.  Nasopharynx without discharge.  Septum midline.  JVP not elevated trachea midline thyroid not enlarged.  Lungs revealed left lower lobe crackles.  Rest of lung fields clear.  Air entry is good bilaterally.  Percussion not resonant bilaterally.  Chest expansion equal no chest wall deformities or tenderness.  Heart examination S1-S2 present rhythm regular.  Somewhat tachycardic.  No murmurs noted.  No edema lower extremities.  Abdomen is obese, soft, nontender.  Bowel sounds present no liver or spleen enlargement.  No peripheral cyanosis clubbing.  Moves all 4 extremities sensory motor intact.  No cervical, axillary, inguinal adenopathy.    Results Review:    I have reviewed the laboratory and imaging data from current admission. My annotations are as noted in assessment and plan.    Medication Review:  I have reviewed the current MAR.  My annotations are as noted in assessment and plan.    Plan   PCCM Problems  Left lower lobe pneumonia  Relevant Medical Diagnoses  DKA  Noncompliant with medical therapy  Hypertension    Plan of Treatment  Pneumonia left lower lobe which requires treatment with antibiotics.  Agree with current antibiotics.  Requires follow-up imaging to make sure imaging clears up.    DKA on protocol per hospitalist.  I discussed with Dr. Agee.  Agree with current treatment.  Metabolic acidosis should resolve with treatment.    Part of this note may be an electronic transcription/translation of spoken language to printed text using the Dragon Dictation System.      Electronically signed by Arnulfo Garcia MD at 2/6/2019  7:20 PM

## 2019-02-09 LAB
BACTERIA SPEC RESP CULT: ABNORMAL
BACTERIA SPEC RESP CULT: ABNORMAL
GRAM STN SPEC: ABNORMAL

## 2019-02-11 LAB
BACTERIA SPEC AEROBE CULT: NORMAL
BACTERIA SPEC AEROBE CULT: NORMAL

## 2021-01-01 NOTE — H&P
Patient Care Team:  Miguel Ray DO as PCP - General (Family Medicine)    Chief complaint:  Left sided chest pain    History of present illness:  This is a 52-year-old male patient with history of diabetes and hypertension.  He is supposed to be on insulin but he does not have any insurance.  He has not been taking medications for the last 2 years.  He started last Friday, a week ago, complaining of left sided back pain which gradually got worse and extended up to his left chest.  Pain was worse with movement and cough.  He was hospitalized at [facility] for few days and was told that he has early pneumonia.  He was treated with IV antibiotics and pain medications.  However, he left the hospital AMA thinking that he was not getting appropriate treatment.  His symptoms are gradually getting worse until he presented to the emergency department at [facility].  CT angiography chest revealed loculated left pleural effusion.  Patient was also on DKA.    Upon my evaluation in the CCU, patient reported severe left-sided chest pain.  He stated that he had some associated mild cough over the last week productive of dark phlegm.  He also reported shortness of breath which is new.  He denies recent trauma or fall.  He denies preceding upper respiratory tract infection but he reported that over the last few days he started having runny nose.  No fever or chills.    Labs reviewed:  AB.98 on 2 L oxygen; sodium 135; anion gap 13; troponin negative; lactic acid 1.2; glucose 313; WBC 21; platelets 474; troponin negative    Review of Systems:  Constitutional: No fever or chills.   ENMT: No sinus congestion.  runny nose  Cardiovascular: Left sided chest pain. No palpitation or legs swelling.    Respiratory: Dyspnea and cough.  No wheezing.    Gastrointestinal: No constipation, diarrhea or abdominal pain   Neurology: No headache, weakness, numbness or dizziness.   Musculoskeletal: No joints  pain, stiffness or swelling.   Psychiatry: No depression.  Lymphatic: No swollen glands.  Integumentary: No rash.    History  Past Medical History:   Diagnosis Date   • Diabetes mellitus (CMS/HCC)    • Hypertension      Past Surgical History:   Procedure Laterality Date   • EYE SURGERY     • INNER EAR SURGERY       History reviewed. No pertinent family history.  Social History   Substance Use Topics   • Smoking status: Never Smoker   • Smokeless tobacco: Never Used   • Alcohol use Yes      Comment: occ     Prescriptions Prior to Admission   Medication Sig Dispense Refill Last Dose   • BABY ASPIRIN PO Take 1 tablet by mouth Daily.   Past Week at Unknown time   • lisinopril (PRINIVIL,ZESTRIL) 20 MG tablet Take 20 mg by mouth Daily.   Past Week at Unknown time   • naproxen sodium (ALEVE) 220 MG tablet Take 220 mg by mouth 2 (Two) Times a Day As Needed.   Past Week at Unknown time     Allergies:  Penicillins    Vital Signs  Temp:  [97.8 °F (36.6 °C)-98.9 °F (37.2 °C)] 98.9 °F (37.2 °C)  Heart Rate:  [106-123] 107  Resp:  [20-44] 24  BP: (121-181)/() 125/77    Physical Exam:  Constitutional: Not in acute distress.  Eyes: Injected conjunctiva, EOMI.  ENMT: Reynolds 3. No oral thrush. Dry tongue  Heart: Tachycardia but regular rhythm, no murmur  Lungs: Good and equal air entry bilaterally.   no crackles or wheezing       Abdomen:  Soft. No tenderness or dullness.  Extremities: No cyanosis, clubbing or pitting edema. Moves all extremities.  Neuro: Conscious, alert, oriented x3  Psych: Appropriate mood and affect.    Integumentary: No rash  Lymphatic: No palpable cervical or supraclavicular lymph nodes.            Diagnostic imaging:  I personally and independently reviewed the following images:   Loculated left pleural effusion.  Left lower lobe consolidation which could represent atelectasis versus pneumonia.  Otherwise chest was clear       EKG dated 7/7/18:  Sinus tachycardia.  No ischemic  changes.      Assessment:  1. DKA  2. Loculated left pleural effusion  3. Left sided chest pain, 2ary to #2  4. Left lower lobe consolidation, atelectasis versus pneumonia, favors the latter  5. Sepsis vs SIRS  6. Hyponatremia, likely related to hyperglycemia  7. Leukocytosis  8. History of hypertension    Plan:  · Admit to ICU.  · Start DKA protocol.  Monitor electrolytes.  Ultimately transitioned to subcutaneous insulin.  · Pain control with morphine 2 mg every 4 hours when necessary Lortab 10 every 6 hours when necessary.  · Will need drainage of the left pleural effusion.  Likely need a chest tube which will attempt to insert later tonight.  · Check pro-calcitonin.  · Initiate antibiotic treatment with Rocephin and azithromycin to cover community-acquired pneumonia.  · Hold his antihypertensives for now.  · Case management/ to help with meds/insurance.           I discussed the patients findings and my recommendations with patient, family and nursing staff.     Josee Munoz MD  07/07/18  7:22 PM    Time: Critical care 35 min      This note was dictated utilizing Dragon dictation   (4) no apparent problem

## 2023-05-22 NOTE — PROGRESS NOTES
Continued Stay Note  [facility]     Patient Name: Myles Toledo  MRN: [id number]  Today's Date: 7/11/2018    Admit Date: 7/7/2018          Discharge Plan     Row Name 07/11/18 1401       Plan    Plan Home     Patient/Family in Agreement with Plan yes    Plan Comments Met with pt at bedside.  Pt agreeable for CCP to make follow up appoint with [facility] and [facility] ([phone]).  Pt appt made for Tuesday 9:30 am with Cecilia ART.  Pt agreeable to have meds filled at [facility] retail pharmacy if discharged during operating hours.  Discussed need for glucometer with pt who states he will get glucometer for 9.99 at [facility].  DEWEY Dong RN              Discharge Codes    No documentation.           Naila Dong     Yes

## (undated) DEVICE — TOTAL TRAY, 16FR 10ML SIL FOLEY, URN: Brand: MEDLINE

## (undated) DEVICE — VISUALIZATION SYSTEM: Brand: CLEARIFY

## (undated) DEVICE — ANTIBACTERIAL UNDYED BRAIDED (POLYGLACTIN 910), SYNTHETIC ABSORBABLE SUTURE: Brand: COATED VICRYL

## (undated) DEVICE — BLAKE CARDIO CONNECTOR 1:1: Brand: J-VAC

## (undated) DEVICE — APPL DURAPREP IODOPHOR APL 26ML

## (undated) DEVICE — OASIS DRAIN, SINGLE, INLINE & ATS COMPATIBLE: Brand: OASIS

## (undated) DEVICE — SUT SILK 0 PSL 18IN 580H

## (undated) DEVICE — 3M™ STERI-STRIP™ REINFORCED ADHESIVE SKIN CLOSURES, R1547, 1/2 IN X 4 IN (12 MM X 100 MM), 6 STRIPS/ENVELOPE: Brand: 3M™ STERI-STRIP™

## (undated) DEVICE — ENCORE® LATEX ORTHO SIZE 8, STERILE LATEX POWDER-FREE SURGICAL GLOVE: Brand: ENCORE

## (undated) DEVICE — LOU THORACIC: Brand: MEDLINE INDUSTRIES, INC.

## (undated) DEVICE — ENDOPATH 10MM ENDOSCOPIC BLUNT CHERRY DISSECTORS (12 POUCHES CONTAINING 3 DISSECTORS EACH): Brand: ENDOPATH

## (undated) DEVICE — SPNG GZ WOVN 4X4IN 12PLY 10/BX STRL

## (undated) DEVICE — 28 FR STRAIGHT – SOFT PVC CATHETER: Brand: PVC THORACIC CATHETERS

## (undated) DEVICE — 3M™ STERI-STRIP™ COMPOUND BENZOIN TINCTURE 40 BAGS/CARTON 4 CARTONS/CASE C1544: Brand: 3M™ STERI-STRIP™

## (undated) DEVICE — Device

## (undated) DEVICE — WOUND RETRACTOR AND PROTECTOR: Brand: ALEXIS WOUND PROTECTOR-RETRACTOR

## (undated) DEVICE — TRAP,MUCUS SPECIMEN, 80CC: Brand: MEDLINE

## (undated) DEVICE — DRN WND CH RND FUL/FLUT NO/TROC 3/8IN 28F

## (undated) DEVICE — TAPE MICROFM 2IN LF

## (undated) DEVICE — SYS PERFUS SEP PLATLT W TIPS CUST

## (undated) DEVICE — GOWN,NON-REINFORCED,SIRUS,SET IN SLV,XL: Brand: MEDLINE